# Patient Record
Sex: FEMALE | Race: BLACK OR AFRICAN AMERICAN | NOT HISPANIC OR LATINO
[De-identification: names, ages, dates, MRNs, and addresses within clinical notes are randomized per-mention and may not be internally consistent; named-entity substitution may affect disease eponyms.]

---

## 2024-03-15 PROBLEM — M17.0 PRIMARY OSTEOARTHRITIS OF BOTH KNEES: Status: RESOLVED | Noted: 2024-03-15 | Resolved: 2024-03-15

## 2024-03-15 PROBLEM — Z00.00 ENCOUNTER FOR PREVENTIVE HEALTH EXAMINATION: Status: ACTIVE | Noted: 2024-03-15

## 2024-03-15 PROBLEM — Z86.39 HISTORY OF HYPERLIPIDEMIA: Status: RESOLVED | Noted: 2024-03-15 | Resolved: 2024-03-15

## 2024-03-15 PROBLEM — Z86.79 HISTORY OF HYPERTENSION: Status: RESOLVED | Noted: 2024-03-15 | Resolved: 2024-03-15

## 2024-03-15 RX ORDER — MELOXICAM 15 MG/1
15 TABLET ORAL
Refills: 0 | Status: ACTIVE | COMMUNITY

## 2024-03-15 RX ORDER — TIZANIDINE 4 MG/1
4 TABLET ORAL
Refills: 0 | Status: ACTIVE | COMMUNITY

## 2024-03-15 RX ORDER — AMLODIPINE BESYLATE 5 MG/1
5 TABLET ORAL
Refills: 0 | Status: ACTIVE | COMMUNITY

## 2024-03-18 ENCOUNTER — APPOINTMENT (OUTPATIENT)
Dept: HEMATOLOGY ONCOLOGY | Facility: CLINIC | Age: 54
End: 2024-03-18
Payer: MEDICAID

## 2024-03-18 ENCOUNTER — LABORATORY RESULT (OUTPATIENT)
Age: 54
End: 2024-03-18

## 2024-03-18 ENCOUNTER — APPOINTMENT (OUTPATIENT)
Dept: BREAST CENTER | Facility: CLINIC | Age: 54
End: 2024-03-18
Payer: MEDICAID

## 2024-03-18 ENCOUNTER — NON-APPOINTMENT (OUTPATIENT)
Age: 54
End: 2024-03-18

## 2024-03-18 ENCOUNTER — APPOINTMENT (OUTPATIENT)
Dept: HEMATOLOGY ONCOLOGY | Facility: CLINIC | Age: 54
End: 2024-03-18

## 2024-03-18 VITALS
HEIGHT: 64 IN | WEIGHT: 135 LBS | BODY MASS INDEX: 23.05 KG/M2 | DIASTOLIC BLOOD PRESSURE: 93 MMHG | HEART RATE: 93 BPM | SYSTOLIC BLOOD PRESSURE: 134 MMHG

## 2024-03-18 VITALS — OXYGEN SATURATION: 97 % | TEMPERATURE: 98.3 F

## 2024-03-18 DIAGNOSIS — Z86.39 PERSONAL HISTORY OF OTHER ENDOCRINE, NUTRITIONAL AND METABOLIC DISEASE: ICD-10-CM

## 2024-03-18 DIAGNOSIS — M17.0 BILATERAL PRIMARY OSTEOARTHRITIS OF KNEE: ICD-10-CM

## 2024-03-18 DIAGNOSIS — Z80.3 FAMILY HISTORY OF MALIGNANT NEOPLASM OF BREAST: ICD-10-CM

## 2024-03-18 DIAGNOSIS — Z80.41 FAMILY HISTORY OF MALIGNANT NEOPLASM OF OVARY: ICD-10-CM

## 2024-03-18 DIAGNOSIS — Z78.9 OTHER SPECIFIED HEALTH STATUS: ICD-10-CM

## 2024-03-18 DIAGNOSIS — Z86.79 PERSONAL HISTORY OF OTHER DISEASES OF THE CIRCULATORY SYSTEM: ICD-10-CM

## 2024-03-18 PROCEDURE — 99205 OFFICE O/P NEW HI 60 MIN: CPT

## 2024-03-18 PROCEDURE — 93702 BIS XTRACELL FLUID ANALYSIS: CPT | Mod: NC

## 2024-03-18 NOTE — HISTORY OF PRESENT ILLNESS
[Disease: _____________________] : Disease: [unfilled] [de-identified] : 53 year old female with newly diagnosed multifocal triple negative right breast cancer is referred by Dr. Will Trejo to discuss neoadjuvant systemic therapy.  Patient palpated a lump in right breast.  2/21/24 R breast US: New 3 mm hypoechoic mass right 6:00 3 cm from the nipple. New 1 cm hypoechoic mass with indistinct margins right 9:00 4 cm from the nipple . 1.5 x 0.9 x 1.7 cm irregular hypoechoic mass right 10:00 4 cm from the nipple underlying region of palpable mass.  New 1.9 x 0.4 cm irregular hypoechoic mass right 10:00 5 cm from the nipple underlying region of palpable mass. New 1.6 x 1.2 x 1.8 cm irregular hypoechoic mass with cystic component right 10:00 6 cm from the nipple underlying region of palpable mass. Lymph nodes with thickened cortex right axillary region.  3/5/24 b/l dx MG BREAST COMPOSITION:  The breasts are extremely dense, which lowers the sensitivity of mammography.  Spot compression views of the upper outer right breast do not demonstrate any distinct mammographic abnormalities, probably due to the dense nature of the patient's breast. No suspicious masses, architectural distortion, or significant calcifications are detected in the left breast. b/l US: - 6 o'clock axis, 3 cm from the nipple, 0.3 x 0.3 x 0.4 cm ovoid circumscribed hypoechoic mass -9 o'clock axis, 4 cm from the nipple, 1.0 x 0.6 x 0.5 cm ovoid parallel hypoechoic mass with irregular margins. Ultrasound-guided biopsy is recommended. -10 o'clock axis, 4 cm from the nipple, 1.6 x 0.9 x 1.7 cm ovoid parallel hypoechoic mass with irregular margins,  -10 o'clock axis, 4 cm from the nipple, 1.4 x 0.8 x 1.5 cm ovoid parallel hypoechoic mass with irregular margins -10 o'clock axis, 5 cm from the nipple, 2.5 x 0.7 x 1.4 cm irregular hypoechoic mass -10 o'clock axis, 6 cm from the nipple, 2.6 x 1.3 x 1.9 cm irregular hypoechoic mass. -The irregular hypoechoic masses at the 10 o'clock axis, spanning the 4 to 6 cm from the nipple region, appear to be contiguous with one another and can be biopsied simultaneously. -Abnormally thickened right axillary lymph nodes, with the most prominent measuring 1.9 x 0.9 x 1.7 cm. No suspicious solid or complex cystic mass is detected in the left breast. No morphologically abnormal axillary lymph nodes are detected.   3/5/24 US guided biopsy x 3: 1. Right breast 10 o'clock axis, 4-6 cm from the nipple, core biopsy: -Poorly differentiated invasive ductal carcinoma, measuring 0.9 cm in maximal length in this material  ER 0%, MT 0%, HER2 1+, Ki-67 60% -Ductal carcinoma in situ is also present, intermediate to high nuclear grade, solid pattern with extension into lobules. 2. Right breast 9 o'clock axis, 4 cm from the nipple, core biopsy: -Poorly differentiated invasive ductal carcinoma morphologically similar to specimen 1, measuring 0.5 cm in maximal length in this material. ER 0%, MT 0%, HER2 0, Ki-67 44% -Ductal carcinoma in situ is also present, intermediate to high nuclear grade, solid pattern, with extension into lobules. 3. Right axilla, core biopsy: Metastatic carcinoma involving lymph node tissue and morphologically similar to specimen 1 and 2  [de-identified] : poorly differentiated invasive ductal carcinoma [de-identified] : ER-, RI-, HER2 0-1+, Ki-67 44-60% [de-identified] : Reports moderate intermittent pain in R breast. Patient states pain is a 5 out of 10.  Denies any complaints, except for chronic knee and back pain.

## 2024-03-18 NOTE — PHYSICAL EXAM
[Restricted in physically strenuous activity but ambulatory and able to carry out work of a light or sedentary nature] : Status 1- Restricted in physically strenuous activity but ambulatory and able to carry out work of a light or sedentary nature, e.g., light house work, office work [Normal] : affect appropriate [de-identified] : palpable solid (consisting of multifocal/centric lesions) covering an area of about 5cm, mobile, no skin changes, no nipple changes

## 2024-03-18 NOTE — ASSESSMENT
[FreeTextEntry1] : 53 year old perimenopausal woman (LMP 10/2023) with newly diagnosed multifocal triple negative right breast cancer is referred by Dr. Will Trejo to discuss neoadjuvant systemic therapy.  cT3 cN1 Stage IIIC poorly diff IDC ER 0% TX 0% HER2 renetta 1+ Ki 67 60%   Extensive discussion with patient, her  and son regarding the type of cancer, triple negative, the clinical stage necessitating neoadjuvant chemotherapy together with immunotherapy as per Keynote 522 clinical trial - ddAC with Neulasta K75poxy x4 cycles followed by weekly Taxol x12 concurrently with Pembrolizumab Q6wks during this treatment and followed after surgery as well every 3 wks x9 cycles. Mentioned in case of lack of pCR, capecitabine to be recommended. Also will need to see rad onc post op.   Reviewed potential side effects of chemotherapy and immunotherapy providing patient with patient information on each of the drugs above. Reviewed that chemotherapy can cause fatigue, hair loss, allergic/infusion reactions, skin changes, poor appetite, raise risk of infection by causing bone marrow suppression with subsequent low blood counts, potentially needing blood transfusions, also growth factor support to shorten duration of neutropenia, also nausea/vomiting, loose stools, musculoskeletal pain, peripheral neuropathy, infertility and teratogenic effects, as well as small risk of cardiomyopathy/heart failure and small risk of secondary malignancies. Also reviewed side effects that can arise from immunotherapy including but not limited to inflammatory conditions involving different organs, leading to hypothyroidism in case the thyroid is affected by treatment, colitis in case the colon is involved, pericarditis, pneumonitis etc.   Plan -  MRI breast PET to complete staging Echo IR for port placement Labs today incl HIV and hepatitis panel  Genetic testing appointment today  Compazine prn nausea Instructed to obtain a working thermometer for home and to call right away or go to ER in case of fever/temp of 100.4F or higher.   RTC in 1-2 wks to start treatment.

## 2024-03-19 NOTE — DISCUSSION/SUMMARY
[FreeTextEntry1] : REASON FOR CONSULT Larisa Workman is a 53-year-old female who was referred by Dr. Vida Trejo for cancer genetic counseling and risk assessment due to a recent breast cancer diagnosis. She was accompanied by her  and her son.   RELEVANT MEDICAL HISTORY Ms. Workman was recently diagnosed with right breast cancer in 2024 at 53 years old. Pathology report revealed right triple negative invasive ductal carcinoma metastatic to the right axillary lymph nodes.  Upcoming imaging and genetic testing will help to determine treatment plan.   OTHER MEDICAL AND SURGICAL HISTORY: -	Medical History: hyperlipidemia, hypertension, primary osteoarthritis of both knees -	Surgical History: No major medical history reported.   PAST OB/GYN HISTORY: Obstetrical History:  Age at Menarche: 12 Perimenopausal  Age at First Live Birth: 26 Oral Contraceptive Use: Yes, >5 years in total Other Contraceptive Use: IUD (Paragard), approximately 4 years in total Hormone Replacement Therapy: No  CANCER SCREENING HISTORY:   Breast:  -	Mammography: last 2024, extremely dense breast tissue. -	Sonography: last 2024, highly suspicious right breast contiguous masses and a 4-centimeter right breast mass were identified as well as abnormally thickened right axillary lymph nodes. Ultrasound-guided biopsies were recommended.  -	MRI: To be scheduled. -	Biopsies: 2024-Right breast, IDC and DCIS; Right axilla, metastatic carcinoma involving lymph node tissue and morphologically similar to right breast specimens GYN: -	Pelvic Examination: last 2 years ago, reportedly normal  -	Sonography: last 2 years ago, patient reports fibroids were identified. -	CA-125: No Colon: -	Colonoscopy: No -	Upper Endoscopy: No  Skin:   -	FBSE: No -	Lesions biopsied/removed: No  SOCIAL HISTORY: -	Tobacco-product use: No  FAMILY HISTORY: Maternal ancestry and paternal ancestry were reported as American. Ashkenazi Congregational ancestry and consanguinity were denied. A detailed family history of cancer was ascertained. Relevant diagnoses are detailed below and in the scanned pedigree.   To Ms. Workman's knowledge, no one in the family has had germline testing for cancer susceptibility.   	 	RISK ASSESSMENT: Ms. Workman's personal history of breast cancer and/or family history of breast cancer, ovarian cancer, thyroid cancer, and pancreatic cancer is suggestive of an inherited predisposition to breast cancer and/or ovarian cancer and/or pancreatic cancer and/or thyroid cancer and related cancers. Given that she plans to make surgical decisions based on results from genetic testing, we recommended the [facility] BRCAplus STAT Panel testing for genes associated with breast cancer (typically results within 5-12 days). This test analyzes 13 genes: RHIANNON, BARD1, BRCA1, BRCA2, CDH1, CHEK2, NF1, PALB2, PTEN, RAD51C, RAD51D, STK11, and TP53.  This testing will be automatically reflexed to a larger panel of genes associated with breast cancer, thyroid cancer, pancreatic cancer, and gynecological cancers which includes the following genes: APC, RHIANNON, BARD1, BMPR1A, BRCA1, BRCA2, BRIP1, CDH1, CDKN2A, CHEK2, DICER1, EPCAM, MEN1, MLH1, MSH2, MSH6, NF1, PALB2, PMS2, LJUNS2U, PTEN, RAD51C, RAD51D, RET, SMAD4, STK11, TP53, TSC1, TSC2, VHL  We discussed the risks, benefits and limitations, and implications of genetic testing. We also discussed the psychosocial implications of genetic testing. Possible test results were reviewed with Ms. Workman, along with associated medical management options.   Ms. Workman consented to the above-mentioned genetic testing panel. Blood was drawn in our laboratory and sent to [facility] today.  PLAN:  1.	Blood drawn today will be sent to [facility] for analysis.  2.	We will contact Ms. Workman once the results are available and will schedule a follow-up appointment, as needed. STAT results generally return in 10-12 days from the day the sample is received in the lab.  For any additional questions please call Cancer Genetics at [phone].    Neeta Donald MS, [facility] Genetic Counselor, Cancer Genetics   CC: Dr. Vida Trejo

## 2024-03-20 RX ORDER — OLANZAPINE 5 MG/1
5 TABLET, FILM COATED ORAL
Qty: 16 | Refills: 0 | Status: ACTIVE | COMMUNITY
Start: 2024-03-20 | End: 1900-01-01

## 2024-03-20 RX ORDER — PROCHLORPERAZINE MALEATE 5 MG/1
5 TABLET ORAL
Qty: 30 | Refills: 0 | Status: ACTIVE | COMMUNITY
Start: 2024-03-20 | End: 1900-01-01

## 2024-03-20 RX ORDER — LIDOCAINE AND PRILOCAINE 25; 25 MG/G; MG/G
2.5-2.5 CREAM TOPICAL
Qty: 1 | Refills: 1 | Status: ACTIVE | COMMUNITY
Start: 2024-03-20 | End: 1900-01-01

## 2024-03-25 ENCOUNTER — APPOINTMENT (OUTPATIENT)
Dept: INTERVENTIONAL RADIOLOGY/VASCULAR | Facility: HOSPITAL | Age: 54
End: 2024-03-25

## 2024-03-25 ENCOUNTER — OUTPATIENT (OUTPATIENT)
Dept: OUTPATIENT SERVICES | Facility: HOSPITAL | Age: 54
LOS: 1 days | End: 2024-03-25
Payer: MEDICAID

## 2024-03-25 ENCOUNTER — RESULT REVIEW (OUTPATIENT)
Age: 54
End: 2024-03-25

## 2024-03-25 ENCOUNTER — NON-APPOINTMENT (OUTPATIENT)
Age: 54
End: 2024-03-25

## 2024-03-25 PROCEDURE — 36561 INSERT TUNNELED CV CATH: CPT

## 2024-03-25 PROCEDURE — 99153 MOD SED SAME PHYS/QHP EA: CPT

## 2024-03-25 PROCEDURE — 77001 FLUOROGUIDE FOR VEIN DEVICE: CPT | Mod: 26

## 2024-03-25 PROCEDURE — C1769: CPT

## 2024-03-25 PROCEDURE — 76937 US GUIDE VASCULAR ACCESS: CPT | Mod: 26

## 2024-03-25 PROCEDURE — 99152 MOD SED SAME PHYS/QHP 5/>YRS: CPT

## 2024-03-25 PROCEDURE — 77001 FLUOROGUIDE FOR VEIN DEVICE: CPT

## 2024-03-25 PROCEDURE — 76937 US GUIDE VASCULAR ACCESS: CPT

## 2024-03-25 PROCEDURE — C1788: CPT

## 2024-03-25 NOTE — REVIEW OF SYSTEMS
[Fever] : no fever [Chills] : no chills [Shortness Of Breath] : no shortness of breath [Wheezing] : no wheezing [Skin Lesions] : no skin lesions [Skin Wound] : no skin wound

## 2024-03-25 NOTE — DISCUSSION/SUMMARY
[FreeTextEntry1] : RESULTS TRANSMISSION Larisa Workman is a 53-year-old female who was called on 03/25/2024 for a discussion regarding their genetic testing results related to hereditary cancer predisposition.   Ms. Workman was originally seen at [facility] on 03/18/2024 for hereditary cancer predisposition risk assessment due to a recent breast cancer diagnosis. Ms. Workman decided to pursue genetic testing using the STAT BRCAplus panel with concurrent reflex to a multigene panel of genes associated with breast cancer, gynecological cancers, thyroid cancer, and pancreatic cancer offered at [facility].  TEST RESULTS: NEGATIVE  No pathogenic (disease-causing) variants or VUSs were detected in the following genes:  APC, RHIANNON, BARD1, BMPR1A, BRCA1, BRCA2, BRIP1, CDH1, CDKN2A, CHEK2, DICER1, EPCAM, MEN1, MLH1, MSH2, MSH6, NF1, PALB2, PMS2, JBPJC9P, PTEN, RAD51C, RAD51D, RET, SMAD4, STK11, TP53, TSC1, TSC2, and VHL.  RESULTS INTERPRETATION AND ASSESSMENT: We discussed with Ms. Workman that we will present her personal and family history of cancer along with her genetic testing results to Cancer [facility]etics Case Conference this upcoming Thursday to discuss appropriate management recommendations. We will recontact Ms. Workman to discuss management recommendations after case conference.  PLAN: 1.We will recontact Ms. Workman to discuss management recommendations following Cancer Genetics Case Conference this week.   For any additional questions please call Cancer Genetics at [phone].    Neeta Donald MS, [facility] Genetic Counselor, Cancer Genetics   CC:  Patient Dr. Vida Trejo

## 2024-03-25 NOTE — PAST MEDICAL HISTORY
[Menstruating] : The patient is menstruating [Menarche Age ____] : age at menarche was [unfilled] [Definite ___ (Date)] : the last menstrual period was [unfilled] [Total Preg ___] : G[unfilled] [Live Births ___] : P[unfilled]  [Age At Live Birth ___] : Age at live birth: [unfilled] [History of Hormone Replacement Treatment] : has no history of hormone replacement treatment [FreeTextEntry6] : No [FreeTextEntry7] : No [FreeTextEntry8] : N/A

## 2024-03-25 NOTE — HISTORY OF PRESENT ILLNESS
[FreeTextEntry1] : Patient is a 52 yo F who presents today for initial evaluation of R US bx proven metastatic triple negative IDC found as palpable mass by patient in December and seen on R US as 1 cm mass w/ irregular margins 9:00 4FN and as 3 contiguous masses at 10:00 measuring 1.7 cm 4FN, 1.9 cm 5FN, 1.8 cm 6FN (2/2024). R axillary US bx of abnl R 2.2 cm LN was performed and yielded metastatic carcinoma c/w breast primary. Family history of breast cancer in sister (Age 50s, unknown genetics) and maternal grandmother (Age 60s, unknown genetics), and maternal cousin w/ ovarian cancer (Age 40s, unknown genetics, DOD). No genetic testing. Denies prior breast surgeries. Patient denies skin changes or nipple discharge bilaterally. Patient has hx herniated disc and L knee arthritis for which she takes gabapentin, meloxicam, and tizanidine.  2/21/24: R US (palpable)- new 0.3 cm mass 6:00 3FN, new 1 cm mass w/ indistinct margins 9:00 4FN (rec R US bx), 1.7 cm irregular mass 10:00 4FN (palpable region- rec R US bx), new 1.9 cm irregular mass 10:00 5FN (palpable region-rec R US bx), new 1.8 cm irregular cystic mass 10:00 6FN (palpable region-rec R US bx), R axillary region LNs w/ thickened cortex (rec R US bx). Rec b/l dxMG and L US prior to bx. BIRADS 5. 3/5/24: B/l MG & US- extremely dense, 1 cm mass w/ indistinct margins 9:00 4FN (rec R US bx), 1.7 cm irregular mass 10:00 4FN, 1.9 cm irregular mass 10:00 5FN, 1.8 cm irregular cystic mass 10:00 6FN (all masses 10:00 4-6FN palpable region appear to be contiguous -rec R US bx), R 1.9 cm abnormal axillary LN (rec R US bx), L-no suspicious solid or complex mass detected. BIRADS 4. 3/5/24: R US bx x3- SITE 1) R contiguous masses 10:00 4-6FN (ribbon clip)- IDC (poorly differentiated) (ER-(0%)/NC- (0%)/HER2- (1+)), DCIS (intermediate to high nuclear grade, solid pattern, w/ extension into lobules). Malignant & Concordant. SITE 2) R 1.2 cm mass 9:00 4FN (heart clip)- IDC (poorly differentiated) (ER- (0%)/NC- (0%)/HER2- (0), DCIS (intermediate to high nuclear grade, solid pattern, w/ extension into lobules). Malignant & Concordant. SITE 3) R 2.2 cm axillary LN (Venus clip)- metastatic carcinoma involving LN tissue and morphologically similar to specimens 1 & 2.

## 2024-03-25 NOTE — PHYSICAL EXAM
[Normocephalic] : normocephalic [EOMI] : extra ocular movement intact [Supple] : supple [No Supraclavicular Adenopathy] : no supraclavicular adenopathy [No Cervical Adenopathy] : no cervical adenopathy [de-identified] : R palpable mass 5 cm upper outer quadrant [de-identified] : L breast/axilla/supraclavicular area: No masses, discharge, or adenopathy

## 2024-03-27 ENCOUNTER — OUTPATIENT (OUTPATIENT)
Dept: OUTPATIENT SERVICES | Facility: HOSPITAL | Age: 54
LOS: 1 days | End: 2024-03-27
Payer: MEDICAID

## 2024-03-27 ENCOUNTER — NON-APPOINTMENT (OUTPATIENT)
Age: 54
End: 2024-03-27

## 2024-03-27 ENCOUNTER — RESULT REVIEW (OUTPATIENT)
Age: 54
End: 2024-03-27

## 2024-03-27 DIAGNOSIS — C50.911 MALIGNANT NEOPLASM OF UNSPECIFIED SITE OF RIGHT FEMALE BREAST: ICD-10-CM

## 2024-03-27 PROCEDURE — 93306 TTE W/DOPPLER COMPLETE: CPT

## 2024-03-27 PROCEDURE — 93306 TTE W/DOPPLER COMPLETE: CPT | Mod: 26

## 2024-03-28 ENCOUNTER — NON-APPOINTMENT (OUTPATIENT)
Age: 54
End: 2024-03-28

## 2024-03-28 NOTE — DISCUSSION/SUMMARY
[FreeTextEntry1] : RESULTS TRANSMISSION Larisa Workman is a 53-year-old female who was recontacted on 03/28/2024 to further discuss management recommendations from Cancer [facility]etics Case conference.   Ms. Workman was originally seen at [facility] on 03/18/2024 for hereditary cancer predisposition risk assessment due to a recent breast cancer diagnosis. Ms. Workman decided to pursue genetic testing using the STAT BRCAplus panel with concurrent reflex to a multigene panel of genes associated with breast cancer, gynecological cancers, thyroid cancer, and pancreatic cancer offered at [facility]. Ms. Workman was contacted on 03/25/2024 to discuss her genetic testing results related to hereditary cancer predisposition.  Ms. Workman's personal and family history of cancer as well as her genetic testing results were presented to Cancer Genetics Case conference on 03/28/2024 for review and discussion of management recommendations.   TEST RESULTS: NEGATIVE  No pathogenic (disease-causing) variants or VUSs were detected in the following genes:  APC, RHIANNON, BARD1, BMPR1A, BRCA1, BRCA2, BRIP1, CDH1, CDKN2A, CHEK2, DICER1, EPCAM, MEN1, MLH1, MSH2, MSH6, NF1, PALB2, PMS2, PHAIA6X, PTEN, RAD51C, RAD51D, RET, SMAD4, STK11, TP53, TSC1, TSC2, and VHL.  RESULTS INTERPRETATION AND ASSESSMENT: Given Ms. Workman's personal and current reported family history of cancer, and her negative genetic test results, the following screening guidelines and risk-reducing recommendations were discussed:  BREAST:  - Long-term management and surveillance should be based on Ms. Workman's on- or post-treatment protocol as recommended by her breast care team.   COLON: -As per the [place] Multi-Society Task Force on Colorectal Cancer, we discussed individuals with a first-degree relative with colorectal cancer diagnosed over the age of 60 should start screening via colonoscopies at age 40 and repeat as per the average-risk screening recommendations (or sooner as based on colonoscopy findings).  OTHER:  - In the absence of other indications, Ms. Workman should practice age-appropriate cancer screening of other organ systems as recommended for the general population.  We also discussed that, while no cause of the patient's personal and family history of cancer was identified, this result, while reassuring, does entirely not rule out a hereditary cancer risk in the patient. It is possible, although unlikely, the patient has a mutation in one of the genes tested that is not detectable by this analysis, or has a mutation in a different gene, either known or unknown. It is also possible there is a hereditary cancer predisposition in the family, but the patient did not inherit it.  We informed Ms. Workman that our knowledge of genetics and inherited cancer conditions is changing rapidly. Therefore, we recommended that Ms. Workman contact our office, every 2 to 3 years, to discuss relevant advances in cancer genetics.  We emphasized the importance of re-contacting us with updates regarding her personal and family history of cancer as well as any updates regarding additional cancer genetic test results performed for the patient and/or family members.  Such updates could possibly change our risk assessment and recommendations.   In addition, we discussed the children of Ms. Workman's maternal first cousin who was diagnosed with ovarian cancer could consider pursuing cancer risk assessment genetic counseling with the option of genetic testing. We also discussed that Ms. Workman's daughter pursue cancer risk assessment at 40 years old to determine appropriate breast cancer screening recommendations.  PLAN: 1.See above for recommended screening and risk-reduction strategies. 2. Patient informed consult note(s) will be available through their [facility] patient portal and genetic test results will be released via [facility] laboratory portal.  3. Ms. Workman was encouraged to contact us every 2-3 years to discuss relevant advances in cancer genetics, or sooner if there are any changes in her personal or family history of cancer.  For any additional questions please call Cancer Genetics at [phone].    Neeta Donald MS, [facility] Genetic Counselor, Cancer Genetics  CC:  Patient Dr. Vida Trejo

## 2024-04-01 ENCOUNTER — APPOINTMENT (OUTPATIENT)
Dept: INTERVENTIONAL RADIOLOGY/VASCULAR | Facility: HOSPITAL | Age: 54
End: 2024-04-01

## 2024-04-03 ENCOUNTER — OUTPATIENT (OUTPATIENT)
Dept: OUTPATIENT SERVICES | Facility: HOSPITAL | Age: 54
LOS: 1 days | End: 2024-04-03
Payer: COMMERCIAL

## 2024-04-03 ENCOUNTER — APPOINTMENT (OUTPATIENT)
Dept: INFUSION THERAPY | Facility: CLINIC | Age: 54
End: 2024-04-03

## 2024-04-03 ENCOUNTER — NON-APPOINTMENT (OUTPATIENT)
Age: 54
End: 2024-04-03

## 2024-04-03 ENCOUNTER — APPOINTMENT (OUTPATIENT)
Dept: HEMATOLOGY ONCOLOGY | Facility: CLINIC | Age: 54
End: 2024-04-03
Payer: COMMERCIAL

## 2024-04-03 VITALS
HEIGHT: 64 IN | RESPIRATION RATE: 18 BRPM | WEIGHT: 134.04 LBS | SYSTOLIC BLOOD PRESSURE: 139 MMHG | TEMPERATURE: 98 F | DIASTOLIC BLOOD PRESSURE: 81 MMHG | OXYGEN SATURATION: 100 % | HEART RATE: 78 BPM

## 2024-04-03 VITALS
WEIGHT: 134 LBS | RESPIRATION RATE: 18 BRPM | OXYGEN SATURATION: 100 % | BODY MASS INDEX: 22.88 KG/M2 | DIASTOLIC BLOOD PRESSURE: 81 MMHG | SYSTOLIC BLOOD PRESSURE: 139 MMHG | HEART RATE: 78 BPM | HEIGHT: 64 IN | TEMPERATURE: 98.3 F

## 2024-04-03 DIAGNOSIS — C50.919 MALIGNANT NEOPLASM OF UNSPECIFIED SITE OF UNSPECIFIED FEMALE BREAST: ICD-10-CM

## 2024-04-03 PROCEDURE — 96372 THER/PROPH/DIAG INJ SC/IM: CPT

## 2024-04-03 PROCEDURE — 96375 TX/PRO/DX INJ NEW DRUG ADDON: CPT

## 2024-04-03 PROCEDURE — 99214 OFFICE O/P EST MOD 30 MIN: CPT

## 2024-04-03 PROCEDURE — 96367 TX/PROPH/DG ADDL SEQ IV INF: CPT

## 2024-04-03 PROCEDURE — 96413 CHEMO IV INFUSION 1 HR: CPT

## 2024-04-03 PROCEDURE — 96411 CHEMO IV PUSH ADDL DRUG: CPT

## 2024-04-03 PROCEDURE — 96417 CHEMO IV INFUS EACH ADDL SEQ: CPT

## 2024-04-03 RX ORDER — PALONOSETRON HYDROCHLORIDE 0.25 MG/5ML
0.25 INJECTION, SOLUTION INTRAVENOUS ONCE
Refills: 0 | Status: COMPLETED | OUTPATIENT
Start: 2024-04-03 | End: 2024-04-03

## 2024-04-03 RX ORDER — PEGFILGRASTIM-CBQV 6 MG/.6ML
6 INJECTION, SOLUTION SUBCUTANEOUS ONCE
Refills: 0 | Status: COMPLETED | OUTPATIENT
Start: 2024-04-03 | End: 2024-04-03

## 2024-04-03 RX ORDER — DEXAMETHASONE 0.5 MG/5ML
12 ELIXIR ORAL ONCE
Refills: 0 | Status: COMPLETED | OUTPATIENT
Start: 2024-04-03 | End: 2024-04-03

## 2024-04-03 RX ORDER — DOXORUBICIN HYDROCHLORIDE 2 MG/ML
99 INJECTION, SOLUTION INTRAVENOUS ONCE
Refills: 0 | Status: COMPLETED | OUTPATIENT
Start: 2024-04-03 | End: 2024-04-03

## 2024-04-03 RX ORDER — SODIUM CHLORIDE 9 MG/ML
10 INJECTION INTRAMUSCULAR; INTRAVENOUS; SUBCUTANEOUS ONCE
Refills: 0 | Status: COMPLETED | OUTPATIENT
Start: 2024-04-03 | End: 2024-04-03

## 2024-04-03 RX ORDER — LIDOCAINE 4 G/100G
1 CREAM TOPICAL ONCE
Refills: 0 | Status: COMPLETED | OUTPATIENT
Start: 2024-04-03 | End: 2024-04-03

## 2024-04-03 RX ORDER — PEMBROLIZUMAB 25 MG/ML
400 INJECTION, SOLUTION INTRAVENOUS ONCE
Refills: 0 | Status: COMPLETED | OUTPATIENT
Start: 2024-04-03 | End: 2024-04-03

## 2024-04-03 RX ORDER — FOSAPREPITANT DIMEGLUMINE 150 MG/5ML
150 INJECTION, POWDER, LYOPHILIZED, FOR SOLUTION INTRAVENOUS ONCE
Refills: 0 | Status: COMPLETED | OUTPATIENT
Start: 2024-04-03 | End: 2024-04-03

## 2024-04-03 RX ORDER — CYCLOPHOSPHAMIDE 100 MG
996 VIAL (EA) INTRAVENOUS ONCE
Refills: 0 | Status: COMPLETED | OUTPATIENT
Start: 2024-04-03 | End: 2024-04-03

## 2024-04-03 RX ADMIN — FOSAPREPITANT DIMEGLUMINE 150 MILLIGRAM(S): 150 INJECTION, POWDER, LYOPHILIZED, FOR SOLUTION INTRAVENOUS at 16:55

## 2024-04-03 RX ADMIN — PALONOSETRON HYDROCHLORIDE 0.25 MILLIGRAM(S): 0.25 INJECTION, SOLUTION INTRAVENOUS at 17:00

## 2024-04-03 RX ADMIN — FOSAPREPITANT DIMEGLUMINE 500 MILLIGRAM(S): 150 INJECTION, POWDER, LYOPHILIZED, FOR SOLUTION INTRAVENOUS at 16:25

## 2024-04-03 RX ADMIN — Medication 996 MILLIGRAM(S): at 17:20

## 2024-04-03 RX ADMIN — Medication 996 MILLIGRAM(S): at 18:00

## 2024-04-03 RX ADMIN — SODIUM CHLORIDE 10 MILLILITER(S): 9 INJECTION INTRAMUSCULAR; INTRAVENOUS; SUBCUTANEOUS at 18:45

## 2024-04-03 RX ADMIN — PEMBROLIZUMAB 400 MILLIGRAM(S): 25 INJECTION, SOLUTION INTRAVENOUS at 15:36

## 2024-04-03 RX ADMIN — Medication 212 MILLIGRAM(S): at 16:10

## 2024-04-03 RX ADMIN — Medication 12 MILLIGRAM(S): at 16:25

## 2024-04-03 RX ADMIN — PEMBROLIZUMAB 400 MILLIGRAM(S): 25 INJECTION, SOLUTION INTRAVENOUS at 16:10

## 2024-04-03 RX ADMIN — PEGFILGRASTIM-CBQV 6 MILLIGRAM(S): 6 INJECTION, SOLUTION SUBCUTANEOUS at 18:26

## 2024-04-03 RX ADMIN — DOXORUBICIN HYDROCHLORIDE 594 MILLIGRAM(S): 2 INJECTION, SOLUTION INTRAVENOUS at 17:10

## 2024-04-04 ENCOUNTER — NON-APPOINTMENT (OUTPATIENT)
Age: 54
End: 2024-04-04

## 2024-04-04 NOTE — ASSESSMENT
[FreeTextEntry1] : 53 year old perimenopausal woman (LMP 10/2023) with newly diagnosed multifocal triple negative right breast cancer is referred by Dr. Will Trejo to discuss neoadjuvant systemic therapy.  cT3 cN1 Stage IIIC poorly diff IDC ER 0% IN 0% HER2 renetta 1+ Ki 67 60%   Extensive discussion with patient, her  and son regarding the type of cancer, triple negative, the clinical stage necessitating neoadjuvant chemotherapy together with immunotherapy as per Keynote 522 clinical trial - ddAC with Neulasta [id number] x4 cycles followed by weekly Taxol x12 concurrently with Pembrolizumab Q6wks during this treatment and followed after surgery as well every 3 wks x9 cycles. Mentioned in case of lack of pCR, capecitabine to be recommended. Also will need to see rad onc post op.   Reviewed potential side effects of chemotherapy and immunotherapy providing patient with patient information on each of the drugs above. Reviewed that chemotherapy can cause fatigue, hair loss, allergic/infusion reactions, skin changes, poor appetite, raise risk of infection by causing bone marrow suppression with subsequent low blood counts, potentially needing blood transfusions, also growth factor support to shorten duration of neutropenia, also nausea/vomiting, loose stools, musculoskeletal pain, peripheral neuropathy, infertility and teratogenic effects, as well as small risk of cardiomyopathy/heart failure and small risk of secondary malignancies. Also reviewed side effects that can arise from immunotherapy including but not limited to inflammatory conditions involving different organs, leading to hypothyroidism in case the thyroid is affected by treatment, colitis in case the colon is involved, pericarditis, pneumonitis etc.   Visit 4/3/24: C1 ddAC and pembro ECHO 3/27/24 reviewed: EF 60-65% PET 3/27/24: showed enhancing lesion in R breast c/w her newly diagnosed breast cancer in addition to R axillary LAD. Focal hypermetabolic activity also noted in L colon which warrants GI evaluation at a later date. Patient reminded of need for colonoscopy. (she never had one) Olanzapine, prochlorperazine and lidocaine cream prescriptions sent to her pharmacy. Dietician will see her chairside to address her nutrition questions. She has been cleared for treatment using labs from 3/18/24   Compazine prn nausea, olanzapine 1tab/day for four days starting day of chemo. Instructed to obtain a working thermometer for home and to call right away or go to ER in case of fever/temp of 100.4F or higher.   RTC in 2 weeks for f/u and C2.

## 2024-04-04 NOTE — PHYSICAL EXAM
[Restricted in physically strenuous activity but ambulatory and able to carry out work of a light or sedentary nature] : Status 1- Restricted in physically strenuous activity but ambulatory and able to carry out work of a light or sedentary nature, e.g., light house work, office work [Normal] : affect appropriate [de-identified] : palpable solid (consisting of multifocal/centric lesions) covering an area of about 5cm, mobile, no skin changes, no nipple changes

## 2024-04-04 NOTE — HISTORY OF PRESENT ILLNESS
[Disease: _____________________] : Disease: [unfilled] [de-identified] : 53 year old female with newly diagnosed multifocal triple negative right breast cancer is referred by Dr. Will Trejo to discuss neoadjuvant systemic therapy.  Patient palpated a lump in right breast.  2/21/24 R breast US: New 3 mm hypoechoic mass right 6:00 3 cm from the nipple. New 1 cm hypoechoic mass with indistinct margins right 9:00 4 cm from the nipple . 1.5 x 0.9 x 1.7 cm irregular hypoechoic mass right 10:00 4 cm from the nipple underlying region of palpable mass.  New 1.9 x 0.4 cm irregular hypoechoic mass right 10:00 5 cm from the nipple underlying region of palpable mass. New 1.6 x 1.2 x 1.8 cm irregular hypoechoic mass with cystic component right 10:00 6 cm from the nipple underlying region of palpable mass. Lymph nodes with thickened cortex right axillary region.  3/5/24 b/l dx MG BREAST COMPOSITION:  The breasts are extremely dense, which lowers the sensitivity of mammography.  Spot compression views of the upper outer right breast do not demonstrate any distinct mammographic abnormalities, probably due to the dense nature of the patient's breast. No suspicious masses, architectural distortion, or significant calcifications are detected in the left breast. b/l US: - 6 o'clock axis, 3 cm from the nipple, 0.3 x 0.3 x 0.4 cm ovoid circumscribed hypoechoic mass -9 o'clock axis, 4 cm from the nipple, 1.0 x 0.6 x 0.5 cm ovoid parallel hypoechoic mass with irregular margins. Ultrasound-guided biopsy is recommended. -10 o'clock axis, 4 cm from the nipple, 1.6 x 0.9 x 1.7 cm ovoid parallel hypoechoic mass with irregular margins,  -10 o'clock axis, 4 cm from the nipple, 1.4 x 0.8 x 1.5 cm ovoid parallel hypoechoic mass with irregular margins -10 o'clock axis, 5 cm from the nipple, 2.5 x 0.7 x 1.4 cm irregular hypoechoic mass -10 o'clock axis, 6 cm from the nipple, 2.6 x 1.3 x 1.9 cm irregular hypoechoic mass. -The irregular hypoechoic masses at the 10 o'clock axis, spanning the 4 to 6 cm from the nipple region, appear to be contiguous with one another and can be biopsied simultaneously. -Abnormally thickened right axillary lymph nodes, with the most prominent measuring 1.9 x 0.9 x 1.7 cm. No suspicious solid or complex cystic mass is detected in the left breast. No morphologically abnormal axillary lymph nodes are detected.   3/5/24 US guided biopsy x 3: 1. Right breast 10 o'clock axis, 4-6 cm from the nipple, core biopsy: -Poorly differentiated invasive ductal carcinoma, measuring 0.9 cm in maximal length in this material  ER 0%, CA 0%, HER2 1+, Ki-67 60% -Ductal carcinoma in situ is also present, intermediate to high nuclear grade, solid pattern with extension into lobules. 2. Right breast 9 o'clock axis, 4 cm from the nipple, core biopsy: -Poorly differentiated invasive ductal carcinoma morphologically similar to specimen 1, measuring 0.5 cm in maximal length in this material. ER 0%, CA 0%, HER2 0, Ki-67 44% -Ductal carcinoma in situ is also present, intermediate to high nuclear grade, solid pattern, with extension into lobules. 3. Right axilla, core biopsy: Metastatic carcinoma involving lymph node tissue and morphologically similar to specimen 1 and 2  3/26/24: MRI B/L Breast: Biopsy-proven right breast malignancy measuring at least 5.5 cm. Possible satellite upper central right breast, 2 closed the chest wall to biopsy. Recommend surgical/oncologic managment. Metastatic lymph node in the right axilla. No evidence of left breast malignancy.  [de-identified] : poorly differentiated invasive ductal carcinoma [de-identified] : ER-, [place]-, HER2 0-1+, Ki-67 44-60% [de-identified] : Here to start C1 ddAC + Neulasta with Pembro. Reports mild discomfort around chemoport. Requested to meet with dietician to discuss nutrition/food options while undergoing chemo.  She was accompanied by her daughter at this visit, who used to be an oncology infusion center nurse at [employer].

## 2024-04-08 ENCOUNTER — APPOINTMENT (OUTPATIENT)
Dept: INTERVENTIONAL RADIOLOGY/VASCULAR | Facility: HOSPITAL | Age: 54
End: 2024-04-08

## 2024-04-08 NOTE — HISTORY OF PRESENT ILLNESS
[FreeTextEntry1] : Pt presents for one week port check s/p placement on left chest on 3/25. Pt reports she has some tenderness of the area that was worsened after having imaging done Wednesday where she had to lift her arms above her head.. Pt reports port will be used this upcoming Wednesday for chemo.

## 2024-04-08 NOTE — ASSESSMENT
[FreeTextEntry1] : steri strips removed from left chest incision revealing well healing linear surgical incision with well approximated wound edges. small suture tail trimmed. no erythema, edema, or purulent drainage visualized. verbal wound care instructions given. pt verbalized understanding. RTC PRN.

## 2024-04-08 NOTE — HISTORY OF PRESENT ILLNESS
[FreeTextEntry1] : pt is 2 weeks s/p left chest port placement. reports no issues with chemo infusion last weeks, feels fatigued. denies pain, fever , chills, or drainage of area.

## 2024-04-08 NOTE — ASSESSMENT
[FreeTextEntry1] : left chest and neck with linear surgical incisions. chest incision with mild superficial dehiscence of lateral chest incision. neck incision well approximated. chest incision cleansed with chloraprep and steri strips applied. verbal wound care instructions given. pt to return in 1 week for skin check, or sooner as needed.

## 2024-04-16 RX ORDER — FOSAPREPITANT DIMEGLUMINE 150 MG/5ML
150 INJECTION, POWDER, LYOPHILIZED, FOR SOLUTION INTRAVENOUS ONCE
Refills: 0 | Status: COMPLETED | OUTPATIENT
Start: 2024-04-17 | End: 2024-04-17

## 2024-04-16 RX ORDER — PALONOSETRON HYDROCHLORIDE 0.25 MG/5ML
0.25 INJECTION, SOLUTION INTRAVENOUS ONCE
Refills: 0 | Status: COMPLETED | OUTPATIENT
Start: 2024-04-17 | End: 2024-04-17

## 2024-04-16 RX ORDER — SODIUM CHLORIDE 9 MG/ML
10 INJECTION INTRAMUSCULAR; INTRAVENOUS; SUBCUTANEOUS ONCE
Refills: 0 | Status: COMPLETED | OUTPATIENT
Start: 2024-04-17 | End: 2024-04-17

## 2024-04-16 RX ORDER — LIDOCAINE 4 G/100G
1 CREAM TOPICAL ONCE
Refills: 0 | Status: COMPLETED | OUTPATIENT
Start: 2024-04-17 | End: 2024-04-17

## 2024-04-16 RX ORDER — DEXAMETHASONE 0.5 MG/5ML
12 ELIXIR ORAL ONCE
Refills: 0 | Status: COMPLETED | OUTPATIENT
Start: 2024-04-17 | End: 2024-04-17

## 2024-04-16 RX ORDER — PEGFILGRASTIM-CBQV 6 MG/.6ML
6 INJECTION, SOLUTION SUBCUTANEOUS ONCE
Refills: 0 | Status: COMPLETED | OUTPATIENT
Start: 2024-04-17 | End: 2024-04-17

## 2024-04-17 ENCOUNTER — APPOINTMENT (OUTPATIENT)
Dept: INFUSION THERAPY | Facility: CLINIC | Age: 54
End: 2024-04-17

## 2024-04-17 ENCOUNTER — NON-APPOINTMENT (OUTPATIENT)
Age: 54
End: 2024-04-17

## 2024-04-17 ENCOUNTER — APPOINTMENT (OUTPATIENT)
Dept: HEMATOLOGY ONCOLOGY | Facility: CLINIC | Age: 54
End: 2024-04-17
Payer: COMMERCIAL

## 2024-04-17 ENCOUNTER — LABORATORY RESULT (OUTPATIENT)
Age: 54
End: 2024-04-17

## 2024-04-17 ENCOUNTER — OUTPATIENT (OUTPATIENT)
Dept: OUTPATIENT SERVICES | Facility: HOSPITAL | Age: 54
LOS: 1 days | End: 2024-04-17
Payer: COMMERCIAL

## 2024-04-17 VITALS
TEMPERATURE: 98.5 F | BODY MASS INDEX: 22.53 KG/M2 | HEART RATE: 79 BPM | HEIGHT: 64 IN | WEIGHT: 132 LBS | DIASTOLIC BLOOD PRESSURE: 73 MMHG | SYSTOLIC BLOOD PRESSURE: 116 MMHG | OXYGEN SATURATION: 100 % | RESPIRATION RATE: 18 BRPM

## 2024-04-17 VITALS
OXYGEN SATURATION: 99 % | DIASTOLIC BLOOD PRESSURE: 73 MMHG | TEMPERATURE: 98 F | HEIGHT: 64 IN | WEIGHT: 132.06 LBS | RESPIRATION RATE: 18 BRPM | HEART RATE: 79 BPM | SYSTOLIC BLOOD PRESSURE: 116 MMHG

## 2024-04-17 DIAGNOSIS — C50.919 MALIGNANT NEOPLASM OF UNSPECIFIED SITE OF UNSPECIFIED FEMALE BREAST: ICD-10-CM

## 2024-04-17 PROCEDURE — 99214 OFFICE O/P EST MOD 30 MIN: CPT

## 2024-04-17 PROCEDURE — 96411 CHEMO IV PUSH ADDL DRUG: CPT

## 2024-04-17 PROCEDURE — 96375 TX/PRO/DX INJ NEW DRUG ADDON: CPT

## 2024-04-17 PROCEDURE — 96367 TX/PROPH/DG ADDL SEQ IV INF: CPT

## 2024-04-17 PROCEDURE — 96413 CHEMO IV INFUSION 1 HR: CPT

## 2024-04-17 PROCEDURE — 96372 THER/PROPH/DIAG INJ SC/IM: CPT

## 2024-04-17 RX ORDER — CYCLOPHOSPHAMIDE 100 MG
996 VIAL (EA) INTRAVENOUS ONCE
Refills: 0 | Status: COMPLETED | OUTPATIENT
Start: 2024-04-17 | End: 2024-04-17

## 2024-04-17 RX ORDER — DOXORUBICIN HYDROCHLORIDE 2 MG/ML
99 INJECTION, SOLUTION INTRAVENOUS ONCE
Refills: 0 | Status: COMPLETED | OUTPATIENT
Start: 2024-04-17 | End: 2024-04-17

## 2024-04-17 RX ADMIN — Medication 212 MILLIGRAM(S): at 12:50

## 2024-04-17 RX ADMIN — PEGFILGRASTIM-CBQV 6 MILLIGRAM(S): 6 INJECTION, SOLUTION SUBCUTANEOUS at 15:00

## 2024-04-17 RX ADMIN — Medication 996 MILLIGRAM(S): at 14:35

## 2024-04-17 RX ADMIN — Medication 12 MILLIGRAM(S): at 13:05

## 2024-04-17 RX ADMIN — PALONOSETRON HYDROCHLORIDE 0.25 MILLIGRAM(S): 0.25 INJECTION, SOLUTION INTRAVENOUS at 13:40

## 2024-04-17 RX ADMIN — SODIUM CHLORIDE 10 MILLILITER(S): 9 INJECTION INTRAMUSCULAR; INTRAVENOUS; SUBCUTANEOUS at 15:10

## 2024-04-17 RX ADMIN — FOSAPREPITANT DIMEGLUMINE 150 MILLIGRAM(S): 150 INJECTION, POWDER, LYOPHILIZED, FOR SOLUTION INTRAVENOUS at 13:35

## 2024-04-17 RX ADMIN — Medication 996 MILLIGRAM(S): at 14:00

## 2024-04-17 RX ADMIN — DOXORUBICIN HYDROCHLORIDE 594 MILLIGRAM(S): 2 INJECTION, SOLUTION INTRAVENOUS at 13:50

## 2024-04-17 RX ADMIN — FOSAPREPITANT DIMEGLUMINE 500 MILLIGRAM(S): 150 INJECTION, POWDER, LYOPHILIZED, FOR SOLUTION INTRAVENOUS at 13:05

## 2024-04-17 NOTE — PHYSICAL EXAM
[Restricted in physically strenuous activity but ambulatory and able to carry out work of a light or sedentary nature] : Status 1- Restricted in physically strenuous activity but ambulatory and able to carry out work of a light or sedentary nature, e.g., light house work, office work [Normal] : affect appropriate [de-identified] : down from 5cm to about 3cm, mobile hard mass

## 2024-04-17 NOTE — HISTORY OF PRESENT ILLNESS
[Disease: _____________________] : Disease: [unfilled] [de-identified] : 53 year old female with newly diagnosed multifocal triple negative right breast cancer is referred by Dr. Will Trejo to discuss neoadjuvant systemic therapy.  Patient palpated a lump in right breast.  2/21/24 R breast US: New 3 mm hypoechoic mass right 6:00 3 cm from the nipple. New 1 cm hypoechoic mass with indistinct margins right 9:00 4 cm from the nipple . 1.5 x 0.9 x 1.7 cm irregular hypoechoic mass right 10:00 4 cm from the nipple underlying region of palpable mass.  New 1.9 x 0.4 cm irregular hypoechoic mass right 10:00 5 cm from the nipple underlying region of palpable mass. New 1.6 x 1.2 x 1.8 cm irregular hypoechoic mass with cystic component right 10:00 6 cm from the nipple underlying region of palpable mass. Lymph nodes with thickened cortex right axillary region.  3/5/24 b/l dx MG BREAST COMPOSITION:  The breasts are extremely dense, which lowers the sensitivity of mammography.  Spot compression views of the upper outer right breast do not demonstrate any distinct mammographic abnormalities, probably due to the dense nature of the patient's breast. No suspicious masses, architectural distortion, or significant calcifications are detected in the left breast. b/l US: - 6 o'clock axis, 3 cm from the nipple, 0.3 x 0.3 x 0.4 cm ovoid circumscribed hypoechoic mass -9 o'clock axis, 4 cm from the nipple, 1.0 x 0.6 x 0.5 cm ovoid parallel hypoechoic mass with irregular margins. Ultrasound-guided biopsy is recommended. -10 o'clock axis, 4 cm from the nipple, 1.6 x 0.9 x 1.7 cm ovoid parallel hypoechoic mass with irregular margins,  -10 o'clock axis, 4 cm from the nipple, 1.4 x 0.8 x 1.5 cm ovoid parallel hypoechoic mass with irregular margins -10 o'clock axis, 5 cm from the nipple, 2.5 x 0.7 x 1.4 cm irregular hypoechoic mass -10 o'clock axis, 6 cm from the nipple, 2.6 x 1.3 x 1.9 cm irregular hypoechoic mass. -The irregular hypoechoic masses at the 10 o'clock axis, spanning the 4 to 6 cm from the nipple region, appear to be contiguous with one another and can be biopsied simultaneously. -Abnormally thickened right axillary lymph nodes, with the most prominent measuring 1.9 x 0.9 x 1.7 cm. No suspicious solid or complex cystic mass is detected in the left breast. No morphologically abnormal axillary lymph nodes are detected.   3/5/24 US guided biopsy x 3: 1. Right breast 10 o'clock axis, 4-6 cm from the nipple, core biopsy: -Poorly differentiated invasive ductal carcinoma, measuring 0.9 cm in maximal length in this material  ER 0%, [place] 0%, HER2 1+, Ki-67 60% -Ductal carcinoma in situ is also present, intermediate to high nuclear grade, solid pattern with extension into lobules. 2. Right breast 9 o'clock axis, 4 cm from the nipple, core biopsy: -Poorly differentiated invasive ductal carcinoma morphologically similar to specimen 1, measuring 0.5 cm in maximal length in this material. ER 0%, [place] 0%, HER2 0, Ki-67 44% -Ductal carcinoma in situ is also present, intermediate to high nuclear grade, solid pattern, with extension into lobules. 3. Right axilla, core biopsy: Metastatic carcinoma involving lymph node tissue and morphologically similar to specimen 1 and 2  3/26/24: MRI B/L Breast: Biopsy-proven right breast malignancy measuring at least 5.5 cm. Possible satellite upper central right breast, 2 closed the chest wall to biopsy. Recommend surgical/oncologic managment. Metastatic lymph node in the right axilla. No evidence of left breast malignancy.  [de-identified] : poorly differentiated invasive ductal carcinoma [de-identified] : ER-, [place]-, HER2 0-1+, Ki-67 44-60% [de-identified] : Here to continue treatment C2 ddAC + Neulasta with Pembro every 6 wks. Reports fatigue for about four days after starting chemo. Denies fever/chills, denies nausea/vomiting, loose stools, reports feeling more winded after staying in bed for four days after chemo but states now back at her baseline.  Requested to

## 2024-04-17 NOTE — ASSESSMENT
[FreeTextEntry1] : 53 year old perimenopausal woman (LMP 10/2023) with newly diagnosed multifocal triple negative right breast cancer is referred by Dr. Will Trejo to discuss neoadjuvant systemic therapy.  cT3 cN1 Stage IIIC poorly diff IDC ER 0% [place] 0% HER2 renetta 1+ Ki 67 60%   Extensive discussion with patient, her  and son regarding the type of cancer, triple negative, the clinical stage necessitating neoadjuvant chemotherapy together with immunotherapy as per Keynote 522 clinical trial - ddAC with Neulasta [id number] x4 cycles followed by weekly Taxol x12 concurrently with Pembrolizumab Q6wks during this treatment and followed after surgery as well every 3 wks x9 cycles. Mentioned in case of lack of pCR, capecitabine to be recommended. Also will need to see rad onc post op.   Reviewed potential side effects of chemotherapy and immunotherapy providing patient with patient information on each of the drugs above. Reviewed that chemotherapy can cause fatigue, hair loss, allergic/infusion reactions, skin changes, poor appetite, raise risk of infection by causing bone marrow suppression with subsequent low blood counts, potentially needing blood transfusions, also growth factor support to shorten duration of neutropenia, also nausea/vomiting, loose stools, musculoskeletal pain, peripheral neuropathy, infertility and teratogenic effects, as well as small risk of cardiomyopathy/heart failure and small risk of secondary malignancies. Also reviewed side effects that can arise from immunotherapy including but not limited to inflammatory conditions involving different organs, leading to hypothyroidism in case the thyroid is affected by treatment, colitis in case the colon is involved, pericarditis, pneumonitis etc.   Visit 4/3/24: C1 ddAC and pembro ECHO 3/27/24 reviewed: EF 60-65% PET 3/27/24: showed enhancing lesion in R breast c/w her newly diagnosed breast cancer in addition to R axillary LAD. Focal hypermetabolic activity also noted in L colon which warrants GI evaluation at a later date. Patient reminded of need for colonoscopy. (she never had one) Olanzapine, prochlorperazine and lidocaine cream prescriptions sent to her pharmacy. Dietician will see her chairside to address her nutrition questions. She has been cleared for treatment using labs from 3/18/24  Visit 4/17/24: Patient with response to treatment after the first cycle of AC with Neulasta.  Tolerating treatment well with fatigue but otherwise no problems.  To proceed with C2 ddAC with Neulasta Onpro. Olanzapine starting tonight, instructed to take once nightly x4 days total.  Compazine prn nausea, olanzapine 1tab/day for four days starting day of chemo. Instructed to call right away or go to ER in case of fever/temp of 100.4F or higher.   RTC in 2 weeks for f/u and C3.

## 2024-04-18 LAB
ALBUMIN SERPL ELPH-MCNC: 3.9 G/DL
ALP BLD-CCNC: 66 U/L
ALT SERPL-CCNC: 21 U/L
ANION GAP SERPL CALC-SCNC: 9 MMOL/L
AST SERPL-CCNC: 23 U/L
BILIRUB SERPL-MCNC: 0.5 MG/DL
BUN SERPL-MCNC: 12 MG/DL
CALCIUM SERPL-MCNC: 9.5 MG/DL
CHLORIDE SERPL-SCNC: 109 MMOL/L
CO2 SERPL-SCNC: 27 MMOL/L
CREAT SERPL-MCNC: 0.8 MG/DL
EGFR: 88 ML/MIN/1.73M2
GLUCOSE SERPL-MCNC: 138 MG/DL
POTASSIUM SERPL-SCNC: 4 MMOL/L
PROT SERPL-MCNC: 7 G/DL
SODIUM SERPL-SCNC: 145 MMOL/L

## 2024-04-25 ENCOUNTER — OUTPATIENT (OUTPATIENT)
Dept: OUTPATIENT SERVICES | Facility: HOSPITAL | Age: 54
LOS: 1 days | End: 2024-04-25
Payer: COMMERCIAL

## 2024-04-25 ENCOUNTER — RESULT REVIEW (OUTPATIENT)
Age: 54
End: 2024-04-25

## 2024-04-25 DIAGNOSIS — C50.911 MALIGNANT NEOPLASM OF UNSPECIFIED SITE OF RIGHT FEMALE BREAST: ICD-10-CM

## 2024-04-25 LAB — SURGICAL PATHOLOGY STUDY: SIGNIFICANT CHANGE UP

## 2024-04-25 PROCEDURE — 88321 CONSLTJ&REPRT SLD PREP ELSWR: CPT

## 2024-04-26 RX ORDER — SODIUM CHLORIDE 9 MG/ML
10 INJECTION INTRAMUSCULAR; INTRAVENOUS; SUBCUTANEOUS ONCE
Refills: 0 | Status: COMPLETED | OUTPATIENT
Start: 2024-05-01 | End: 2024-05-01

## 2024-04-26 RX ORDER — LIDOCAINE 4 G/100G
1 CREAM TOPICAL ONCE
Refills: 0 | Status: COMPLETED | OUTPATIENT
Start: 2024-05-01 | End: 2024-05-01

## 2024-04-26 RX ORDER — DOXORUBICIN HYDROCHLORIDE 2 MG/ML
99 INJECTION, SOLUTION INTRAVENOUS ONCE
Refills: 0 | Status: COMPLETED | OUTPATIENT
Start: 2024-05-01 | End: 2024-05-01

## 2024-04-26 RX ORDER — PEGFILGRASTIM-CBQV 6 MG/.6ML
6 INJECTION, SOLUTION SUBCUTANEOUS ONCE
Refills: 0 | Status: COMPLETED | OUTPATIENT
Start: 2024-05-01 | End: 2024-05-01

## 2024-04-26 RX ORDER — CYCLOPHOSPHAMIDE 100 MG
996 VIAL (EA) INTRAVENOUS ONCE
Refills: 0 | Status: COMPLETED | OUTPATIENT
Start: 2024-05-01 | End: 2024-05-01

## 2024-05-01 ENCOUNTER — LABORATORY RESULT (OUTPATIENT)
Age: 54
End: 2024-05-01

## 2024-05-01 ENCOUNTER — APPOINTMENT (OUTPATIENT)
Dept: INFUSION THERAPY | Facility: CLINIC | Age: 54
End: 2024-05-01

## 2024-05-01 ENCOUNTER — NON-APPOINTMENT (OUTPATIENT)
Age: 54
End: 2024-05-01

## 2024-05-01 ENCOUNTER — APPOINTMENT (OUTPATIENT)
Dept: HEMATOLOGY ONCOLOGY | Facility: CLINIC | Age: 54
End: 2024-05-01
Payer: COMMERCIAL

## 2024-05-01 ENCOUNTER — OUTPATIENT (OUTPATIENT)
Dept: OUTPATIENT SERVICES | Facility: HOSPITAL | Age: 54
LOS: 1 days | End: 2024-05-01
Payer: COMMERCIAL

## 2024-05-01 ENCOUNTER — TRANSCRIPTION ENCOUNTER (OUTPATIENT)
Age: 54
End: 2024-05-01

## 2024-05-01 VITALS
WEIGHT: 132 LBS | TEMPERATURE: 98.6 F | DIASTOLIC BLOOD PRESSURE: 82 MMHG | OXYGEN SATURATION: 99 % | HEIGHT: 64 IN | HEART RATE: 77 BPM | RESPIRATION RATE: 18 BRPM | BODY MASS INDEX: 22.53 KG/M2 | SYSTOLIC BLOOD PRESSURE: 120 MMHG

## 2024-05-01 VITALS
TEMPERATURE: 99 F | HEART RATE: 77 BPM | OXYGEN SATURATION: 99 % | DIASTOLIC BLOOD PRESSURE: 82 MMHG | WEIGHT: 132.06 LBS | SYSTOLIC BLOOD PRESSURE: 120 MMHG | HEIGHT: 64 IN | RESPIRATION RATE: 18 BRPM

## 2024-05-01 DIAGNOSIS — C50.919 MALIGNANT NEOPLASM OF UNSPECIFIED SITE OF UNSPECIFIED FEMALE BREAST: ICD-10-CM

## 2024-05-01 PROCEDURE — 96411 CHEMO IV PUSH ADDL DRUG: CPT

## 2024-05-01 PROCEDURE — 96413 CHEMO IV INFUSION 1 HR: CPT

## 2024-05-01 PROCEDURE — 96375 TX/PRO/DX INJ NEW DRUG ADDON: CPT

## 2024-05-01 PROCEDURE — 96367 TX/PROPH/DG ADDL SEQ IV INF: CPT

## 2024-05-01 PROCEDURE — 96372 THER/PROPH/DIAG INJ SC/IM: CPT

## 2024-05-01 PROCEDURE — 99214 OFFICE O/P EST MOD 30 MIN: CPT

## 2024-05-01 RX ORDER — PALONOSETRON HYDROCHLORIDE 0.25 MG/5ML
0.25 INJECTION, SOLUTION INTRAVENOUS ONCE
Refills: 0 | Status: COMPLETED | OUTPATIENT
Start: 2024-05-01 | End: 2024-05-01

## 2024-05-01 RX ORDER — FOSAPREPITANT DIMEGLUMINE 150 MG/5ML
150 INJECTION, POWDER, LYOPHILIZED, FOR SOLUTION INTRAVENOUS ONCE
Refills: 0 | Status: COMPLETED | OUTPATIENT
Start: 2024-05-01 | End: 2024-05-01

## 2024-05-01 RX ORDER — DEXAMETHASONE 0.5 MG/5ML
12 ELIXIR ORAL ONCE
Refills: 0 | Status: COMPLETED | OUTPATIENT
Start: 2024-05-01 | End: 2024-05-01

## 2024-05-01 RX ADMIN — Medication 996 MILLIGRAM(S): at 14:55

## 2024-05-01 RX ADMIN — SODIUM CHLORIDE 10 MILLILITER(S): 9 INJECTION INTRAMUSCULAR; INTRAVENOUS; SUBCUTANEOUS at 15:32

## 2024-05-01 RX ADMIN — DOXORUBICIN HYDROCHLORIDE 594 MILLIGRAM(S): 2 INJECTION, SOLUTION INTRAVENOUS at 14:30

## 2024-05-01 RX ADMIN — FOSAPREPITANT DIMEGLUMINE 150 MILLIGRAM(S): 150 INJECTION, POWDER, LYOPHILIZED, FOR SOLUTION INTRAVENOUS at 14:05

## 2024-05-01 RX ADMIN — Medication 212 MILLIGRAM(S): at 13:15

## 2024-05-01 RX ADMIN — PALONOSETRON HYDROCHLORIDE 0.25 MILLIGRAM(S): 0.25 INJECTION, SOLUTION INTRAVENOUS at 13:32

## 2024-05-01 RX ADMIN — Medication 996 MILLIGRAM(S): at 15:30

## 2024-05-01 RX ADMIN — Medication 12 MILLIGRAM(S): at 13:30

## 2024-05-01 RX ADMIN — FOSAPREPITANT DIMEGLUMINE 500 MILLIGRAM(S): 150 INJECTION, POWDER, LYOPHILIZED, FOR SOLUTION INTRAVENOUS at 13:35

## 2024-05-01 RX ADMIN — PEGFILGRASTIM-CBQV 6 MILLIGRAM(S): 6 INJECTION, SOLUTION SUBCUTANEOUS at 15:38

## 2024-05-02 LAB
ALBUMIN SERPL ELPH-MCNC: 3.7 G/DL
ALP BLD-CCNC: 66 U/L
ALT SERPL-CCNC: 13 U/L
ANION GAP SERPL CALC-SCNC: 4 MMOL/L
AST SERPL-CCNC: 19 U/L
BILIRUB SERPL-MCNC: 0.4 MG/DL
BUN SERPL-MCNC: 10 MG/DL
CALCIUM SERPL-MCNC: 9.7 MG/DL
CHLORIDE SERPL-SCNC: 109 MMOL/L
CO2 SERPL-SCNC: 29 MMOL/L
CREAT SERPL-MCNC: 0.6 MG/DL
EGFR: 107 ML/MIN/1.73M2
GLUCOSE SERPL-MCNC: 114 MG/DL
POTASSIUM SERPL-SCNC: 4.3 MMOL/L
PROT SERPL-MCNC: 7.1 G/DL
SODIUM SERPL-SCNC: 142 MMOL/L

## 2024-05-13 RX ORDER — SODIUM CHLORIDE 9 MG/ML
10 INJECTION INTRAMUSCULAR; INTRAVENOUS; SUBCUTANEOUS ONCE
Refills: 0 | Status: COMPLETED | OUTPATIENT
Start: 2024-05-15 | End: 2024-05-15

## 2024-05-13 RX ORDER — PEGFILGRASTIM-CBQV 6 MG/.6ML
6 INJECTION, SOLUTION SUBCUTANEOUS ONCE
Refills: 0 | Status: COMPLETED | OUTPATIENT
Start: 2024-05-15 | End: 2024-05-15

## 2024-05-14 ENCOUNTER — NON-APPOINTMENT (OUTPATIENT)
Age: 54
End: 2024-05-14

## 2024-05-15 ENCOUNTER — LABORATORY RESULT (OUTPATIENT)
Age: 54
End: 2024-05-15

## 2024-05-15 ENCOUNTER — OUTPATIENT (OUTPATIENT)
Dept: OUTPATIENT SERVICES | Facility: HOSPITAL | Age: 54
LOS: 1 days | End: 2024-05-15
Payer: COMMERCIAL

## 2024-05-15 ENCOUNTER — APPOINTMENT (OUTPATIENT)
Dept: HEMATOLOGY ONCOLOGY | Facility: CLINIC | Age: 54
End: 2024-05-15
Payer: COMMERCIAL

## 2024-05-15 ENCOUNTER — APPOINTMENT (OUTPATIENT)
Dept: INFUSION THERAPY | Facility: CLINIC | Age: 54
End: 2024-05-15

## 2024-05-15 VITALS
SYSTOLIC BLOOD PRESSURE: 120 MMHG | OXYGEN SATURATION: 100 % | RESPIRATION RATE: 18 BRPM | TEMPERATURE: 98.8 F | DIASTOLIC BLOOD PRESSURE: 79 MMHG | BODY MASS INDEX: 22.53 KG/M2 | WEIGHT: 132 LBS | HEART RATE: 78 BPM | HEIGHT: 64 IN

## 2024-05-15 VITALS
SYSTOLIC BLOOD PRESSURE: 124 MMHG | TEMPERATURE: 98 F | DIASTOLIC BLOOD PRESSURE: 76 MMHG | HEART RATE: 76 BPM | OXYGEN SATURATION: 99 % | RESPIRATION RATE: 17 BRPM

## 2024-05-15 VITALS
OXYGEN SATURATION: 98 % | HEIGHT: 64 IN | RESPIRATION RATE: 16 BRPM | WEIGHT: 132.06 LBS | HEART RATE: 78 BPM | TEMPERATURE: 99 F | DIASTOLIC BLOOD PRESSURE: 79 MMHG | SYSTOLIC BLOOD PRESSURE: 120 MMHG

## 2024-05-15 DIAGNOSIS — C50.919 MALIGNANT NEOPLASM OF UNSPECIFIED SITE OF UNSPECIFIED FEMALE BREAST: ICD-10-CM

## 2024-05-15 PROCEDURE — 96411 CHEMO IV PUSH ADDL DRUG: CPT

## 2024-05-15 PROCEDURE — 96417 CHEMO IV INFUS EACH ADDL SEQ: CPT

## 2024-05-15 PROCEDURE — 96413 CHEMO IV INFUSION 1 HR: CPT

## 2024-05-15 PROCEDURE — 96375 TX/PRO/DX INJ NEW DRUG ADDON: CPT

## 2024-05-15 PROCEDURE — 96367 TX/PROPH/DG ADDL SEQ IV INF: CPT

## 2024-05-15 PROCEDURE — 99214 OFFICE O/P EST MOD 30 MIN: CPT

## 2024-05-15 PROCEDURE — 96372 THER/PROPH/DIAG INJ SC/IM: CPT

## 2024-05-15 RX ORDER — PEMBROLIZUMAB 25 MG/ML
400 INJECTION, SOLUTION INTRAVENOUS ONCE
Refills: 0 | Status: COMPLETED | OUTPATIENT
Start: 2024-05-15 | End: 2024-05-15

## 2024-05-15 RX ORDER — PALONOSETRON HYDROCHLORIDE 0.25 MG/5ML
0.25 INJECTION, SOLUTION INTRAVENOUS ONCE
Refills: 0 | Status: COMPLETED | OUTPATIENT
Start: 2024-05-15 | End: 2024-05-15

## 2024-05-15 RX ORDER — LIDOCAINE 4 G/100G
1 CREAM TOPICAL ONCE
Refills: 0 | Status: COMPLETED | OUTPATIENT
Start: 2024-05-15 | End: 2024-05-15

## 2024-05-15 RX ORDER — DOXORUBICIN HYDROCHLORIDE 2 MG/ML
99 INJECTION, SOLUTION INTRAVENOUS ONCE
Refills: 0 | Status: COMPLETED | OUTPATIENT
Start: 2024-05-15 | End: 2024-05-15

## 2024-05-15 RX ORDER — FOSAPREPITANT DIMEGLUMINE 150 MG/5ML
150 INJECTION, POWDER, LYOPHILIZED, FOR SOLUTION INTRAVENOUS ONCE
Refills: 0 | Status: COMPLETED | OUTPATIENT
Start: 2024-05-15 | End: 2024-05-15

## 2024-05-15 RX ORDER — CYCLOPHOSPHAMIDE 100 MG
996 VIAL (EA) INTRAVENOUS ONCE
Refills: 0 | Status: COMPLETED | OUTPATIENT
Start: 2024-05-15 | End: 2024-05-15

## 2024-05-15 RX ORDER — DEXAMETHASONE 0.5 MG/5ML
12 ELIXIR ORAL ONCE
Refills: 0 | Status: COMPLETED | OUTPATIENT
Start: 2024-05-15 | End: 2024-05-15

## 2024-05-15 RX ADMIN — FOSAPREPITANT DIMEGLUMINE 150 MILLIGRAM(S): 150 INJECTION, POWDER, LYOPHILIZED, FOR SOLUTION INTRAVENOUS at 13:28

## 2024-05-15 RX ADMIN — PEGFILGRASTIM-CBQV 6 MILLIGRAM(S): 6 INJECTION, SOLUTION SUBCUTANEOUS at 14:28

## 2024-05-15 RX ADMIN — PEMBROLIZUMAB 400 MILLIGRAM(S): 25 INJECTION, SOLUTION INTRAVENOUS at 12:45

## 2024-05-15 RX ADMIN — SODIUM CHLORIDE 10 MILLILITER(S): 9 INJECTION INTRAMUSCULAR; INTRAVENOUS; SUBCUTANEOUS at 14:27

## 2024-05-15 RX ADMIN — Medication 996 MILLIGRAM(S): at 14:25

## 2024-05-15 RX ADMIN — Medication 212 MILLIGRAM(S): at 13:29

## 2024-05-15 RX ADMIN — FOSAPREPITANT DIMEGLUMINE 500 MILLIGRAM(S): 150 INJECTION, POWDER, LYOPHILIZED, FOR SOLUTION INTRAVENOUS at 12:55

## 2024-05-15 RX ADMIN — PALONOSETRON HYDROCHLORIDE 0.25 MILLIGRAM(S): 0.25 INJECTION, SOLUTION INTRAVENOUS at 12:50

## 2024-05-15 RX ADMIN — Medication 12 MILLIGRAM(S): at 13:45

## 2024-05-15 RX ADMIN — PEMBROLIZUMAB 400 MILLIGRAM(S): 25 INJECTION, SOLUTION INTRAVENOUS at 12:15

## 2024-05-15 RX ADMIN — DOXORUBICIN HYDROCHLORIDE 594 MILLIGRAM(S): 2 INJECTION, SOLUTION INTRAVENOUS at 13:50

## 2024-05-15 RX ADMIN — Medication 996 MILLIGRAM(S): at 13:55

## 2024-05-15 NOTE — PHYSICAL EXAM
[Restricted in physically strenuous activity but ambulatory and able to carry out work of a light or sedentary nature] : Status 1- Restricted in physically strenuous activity but ambulatory and able to carry out work of a light or sedentary nature, e.g., light house work, office work [Normal] : affect appropriate [de-identified] : down from 5cm to about 3cm, mobile hard mass

## 2024-05-15 NOTE — PHYSICAL EXAM
[Restricted in physically strenuous activity but ambulatory and able to carry out work of a light or sedentary nature] : Status 1- Restricted in physically strenuous activity but ambulatory and able to carry out work of a light or sedentary nature, e.g., light house work, office work [Normal] : affect appropriate [de-identified] : down from 5cm to about 3cm, mobile hard mass

## 2024-05-15 NOTE — HISTORY OF PRESENT ILLNESS
[Disease: _____________________] : Disease: [unfilled] [de-identified] : 53 year old female with newly diagnosed multifocal triple negative right breast cancer is referred by Dr. Will Trejo to discuss neoadjuvant systemic therapy. Here today for f/u and tx.   Patient palpated a lump in right breast.  2/21/24 R breast US: New 3 mm hypoechoic mass right 6:00 3 cm from the nipple. New 1 cm hypoechoic mass with indistinct margins right 9:00 4 cm from the nipple . 1.5 x 0.9 x 1.7 cm irregular hypoechoic mass right 10:00 4 cm from the nipple underlying region of palpable mass.  New 1.9 x 0.4 cm irregular hypoechoic mass right 10:00 5 cm from the nipple underlying region of palpable mass. New 1.6 x 1.2 x 1.8 cm irregular hypoechoic mass with cystic component right 10:00 6 cm from the nipple underlying region of palpable mass. Lymph nodes with thickened cortex right axillary region.  3/5/24 b/l dx MG BREAST COMPOSITION:  The breasts are extremely dense, which lowers the sensitivity of mammography.  Spot compression views of the upper outer right breast do not demonstrate any distinct mammographic abnormalities, probably due to the dense nature of the patient's breast. No suspicious masses, architectural distortion, or significant calcifications are detected in the left breast. b/l US: - 6 o'clock axis, 3 cm from the nipple, 0.3 x 0.3 x 0.4 cm ovoid circumscribed hypoechoic mass -9 o'clock axis, 4 cm from the nipple, 1.0 x 0.6 x 0.5 cm ovoid parallel hypoechoic mass with irregular margins. Ultrasound-guided biopsy is recommended. -10 o'clock axis, 4 cm from the nipple, 1.6 x 0.9 x 1.7 cm ovoid parallel hypoechoic mass with irregular margins,  -10 o'clock axis, 4 cm from the nipple, 1.4 x 0.8 x 1.5 cm ovoid parallel hypoechoic mass with irregular margins -10 o'clock axis, 5 cm from the nipple, 2.5 x 0.7 x 1.4 cm irregular hypoechoic mass -10 o'clock axis, 6 cm from the nipple, 2.6 x 1.3 x 1.9 cm irregular hypoechoic mass. -The irregular hypoechoic masses at the 10 o'clock axis, spanning the 4 to 6 cm from the nipple region, appear to be contiguous with one another and can be biopsied simultaneously. -Abnormally thickened right axillary lymph nodes, with the most prominent measuring 1.9 x 0.9 x 1.7 cm. No suspicious solid or complex cystic mass is detected in the left breast. No morphologically abnormal axillary lymph nodes are detected.   3/5/24 US guided biopsy x 3: 1. Right breast 10 o'clock axis, 4-6 cm from the nipple, core biopsy: -Poorly differentiated invasive ductal carcinoma, measuring 0.9 cm in maximal length in this material  ER 0%, CT 0%, HER2 1+, Ki-67 60% -Ductal carcinoma in situ is also present, intermediate to high nuclear grade, solid pattern with extension into lobules. 2. Right breast 9 o'clock axis, 4 cm from the nipple, core biopsy: -Poorly differentiated invasive ductal carcinoma morphologically similar to specimen 1, measuring 0.5 cm in maximal length in this material. ER 0%, CT 0%, HER2 0, Ki-67 44% -Ductal carcinoma in situ is also present, intermediate to high nuclear grade, solid pattern, with extension into lobules. 3. Right axilla, core biopsy: Metastatic carcinoma involving lymph node tissue and morphologically similar to specimen 1 and 2  3/26/24: MRI B/L Breast: Biopsy-proven right breast malignancy measuring at least 5.5 cm. Possible satellite upper central right breast, 2 closed the chest wall to biopsy. Recommend surgical/oncologic managment. Metastatic lymph node in the right axilla. No evidence of left breast malignancy.  [de-identified] : poorly differentiated invasive ductal carcinoma [de-identified] : ER-, [place]-, HER2 0-1+, Ki-67 44-60% [de-identified] : Here to continue treatment C3 ddAC + Neulasta with Pembro every 6 wks. Reports improvement in fatigue since she started taking her anti-nausea medication at night. Reports mild nausea and changes in taste, but no change in appetite and weight is stable. Reports nail discoloration. Denies diarrhea, vomiting, neuropathy, rash.

## 2024-05-15 NOTE — HISTORY OF PRESENT ILLNESS
[Disease: _____________________] : Disease: [unfilled] [de-identified] : 53 year old female with newly diagnosed multifocal triple negative right breast cancer is referred by Dr. Will Trjeo to discuss neoadjuvant systemic therapy. Here today for f/u and tx.   Patient palpated a lump in right breast.  2/21/24 R breast US: New 3 mm hypoechoic mass right 6:00 3 cm from the nipple. New 1 cm hypoechoic mass with indistinct margins right 9:00 4 cm from the nipple . 1.5 x 0.9 x 1.7 cm irregular hypoechoic mass right 10:00 4 cm from the nipple underlying region of palpable mass.  New 1.9 x 0.4 cm irregular hypoechoic mass right 10:00 5 cm from the nipple underlying region of palpable mass. New 1.6 x 1.2 x 1.8 cm irregular hypoechoic mass with cystic component right 10:00 6 cm from the nipple underlying region of palpable mass. Lymph nodes with thickened cortex right axillary region.  3/5/24 b/l dx MG BREAST COMPOSITION:  The breasts are extremely dense, which lowers the sensitivity of mammography.  Spot compression views of the upper outer right breast do not demonstrate any distinct mammographic abnormalities, probably due to the dense nature of the patient's breast. No suspicious masses, architectural distortion, or significant calcifications are detected in the left breast. b/l US: - 6 o'clock axis, 3 cm from the nipple, 0.3 x 0.3 x 0.4 cm ovoid circumscribed hypoechoic mass -9 o'clock axis, 4 cm from the nipple, 1.0 x 0.6 x 0.5 cm ovoid parallel hypoechoic mass with irregular margins. Ultrasound-guided biopsy is recommended. -10 o'clock axis, 4 cm from the nipple, 1.6 x 0.9 x 1.7 cm ovoid parallel hypoechoic mass with irregular margins,  -10 o'clock axis, 4 cm from the nipple, 1.4 x 0.8 x 1.5 cm ovoid parallel hypoechoic mass with irregular margins -10 o'clock axis, 5 cm from the nipple, 2.5 x 0.7 x 1.4 cm irregular hypoechoic mass -10 o'clock axis, 6 cm from the nipple, 2.6 x 1.3 x 1.9 cm irregular hypoechoic mass. -The irregular hypoechoic masses at the 10 o'clock axis, spanning the 4 to 6 cm from the nipple region, appear to be contiguous with one another and can be biopsied simultaneously. -Abnormally thickened right axillary lymph nodes, with the most prominent measuring 1.9 x 0.9 x 1.7 cm. No suspicious solid or complex cystic mass is detected in the left breast. No morphologically abnormal axillary lymph nodes are detected.   3/5/24 US guided biopsy x 3: 1. Right breast 10 o'clock axis, 4-6 cm from the nipple, core biopsy: -Poorly differentiated invasive ductal carcinoma, measuring 0.9 cm in maximal length in this material  ER 0%, [place] 0%, HER2 1+, Ki-67 60% -Ductal carcinoma in situ is also present, intermediate to high nuclear grade, solid pattern with extension into lobules. 2. Right breast 9 o'clock axis, 4 cm from the nipple, core biopsy: -Poorly differentiated invasive ductal carcinoma morphologically similar to specimen 1, measuring 0.5 cm in maximal length in this material. ER 0%, [place] 0%, HER2 0, Ki-67 44% -Ductal carcinoma in situ is also present, intermediate to high nuclear grade, solid pattern, with extension into lobules. 3. Right axilla, core biopsy: Metastatic carcinoma involving lymph node tissue and morphologically similar to specimen 1 and 2  3/26/24: MRI B/L Breast: Biopsy-proven right breast malignancy measuring at least 5.5 cm. Possible satellite upper central right breast, 2 closed the chest wall to biopsy. Recommend surgical/oncologic managment. Metastatic lymph node in the right axilla. No evidence of left breast malignancy.  [de-identified] : poorly differentiated invasive ductal carcinoma [de-identified] : ER-, [place]-, HER2 0-1+, Ki-67 44-60% [de-identified] : Here to continue treatment C4 ddAC + Neulasta with Pembro every 6 wks.  Reports mild nausea and changes in taste, but no change in appetite and weight is stable. Reports nail discoloration. Denies diarrhea, vomiting, neuropathy, rash.

## 2024-05-15 NOTE — ASSESSMENT
[FreeTextEntry1] : 53 year old perimenopausal woman (LMP 10/2023) with newly diagnosed multifocal triple negative right breast cancer is referred by Dr. Will Trejo to discuss neoadjuvant systemic therapy.  cT3 cN1 Stage IIIC poorly diff IDC ER 0% [place] 0% HER2 renetta 1+ Ki 67 60%   Extensive discussion with patient, her  and son regarding the type of cancer, triple negative, the clinical stage necessitating neoadjuvant chemotherapy together with immunotherapy as per Keynote 522 clinical trial - ddAC with Neulasta [id number] x4 cycles followed by weekly Carboplatin/Taxol x12 concurrently with Pembrolizumab Q6wks during this treatment and followed after surgery as well every 3 wks x9 cycles. Mentioned in case of lack of pCR, capecitabine to be recommended. Also will need to see rad onc post op.   Reviewed potential side effects of chemotherapy and immunotherapy providing patient with patient information on each of the drugs above. Reviewed that chemotherapy can cause fatigue, hair loss, allergic/infusion reactions, skin changes, poor appetite, raise risk of infection by causing bone marrow suppression with subsequent low blood counts, potentially needing blood transfusions, also growth factor support to shorten duration of neutropenia, also nausea/vomiting, loose stools, musculoskeletal pain, peripheral neuropathy, infertility and teratogenic effects, as well as small risk of cardiomyopathy/heart failure and small risk of secondary malignancies. Also reviewed side effects that can arise from immunotherapy including but not limited to inflammatory conditions involving different organs, leading to hypothyroidism in case the thyroid is affected by treatment, colitis in case the colon is involved, pericarditis, pneumonitis etc.   Visit 4/3/24: C1 ddAC and pembro ECHO 3/27/24 reviewed: EF 60-65% PET 3/27/24: showed enhancing lesion in R breast c/w her newly diagnosed breast cancer in addition to R axillary LAD. Focal hypermetabolic activity also noted in L colon which warrants GI evaluation at a later date. Patient reminded of need for colonoscopy. (she never had one) Olanzapine, prochlorperazine and lidocaine cream prescriptions sent to her pharmacy. Dietician will see her chairside to address her nutrition questions. She has been cleared for treatment using labs from 3/18/24  Visit 4/17/24: Patient with response to treatment after the first cycle of AC with Neulasta.  Tolerating treatment well with fatigue but otherwise no problems.  To proceed with C2 ddAC with Neulasta Onpro. Olanzapine starting tonight, instructed to take once nightly x4 days total.  Compazine prn nausea, olanzapine 1tab/day for four days starting day of chemo. Instructed to call right away or go to ER in case of fever/temp of 100.4F or higher.   Visit 5/1/24: Labs reviewed and patient cleared for C3 ddAC with Neulasta Onpro. Reports minimal nausea and nail discoloration, otherwise no other reported AEs. RTC 5/15 for follow up and C4  5/15/24 Labs reviewed and patient cleared for C4 ddAC with Neulasta Onpro. She is so far tolerating the treatment well.  Reviewed side effects of weekly Paclitaxel, Carboplatin, and additional questions regarding the next phase of the treatment were answered. Pembrolizumab to continue [id number]. Reassurance provided regarding concerns about dietary restrictions. Consent obtained. RTC 5/29 for follow up and to start weekly TC

## 2024-05-15 NOTE — ASSESSMENT
[FreeTextEntry1] : 53 year old perimenopausal woman (LMP 10/2023) with newly diagnosed multifocal triple negative right breast cancer is referred by Dr. Will Trejo to discuss neoadjuvant systemic therapy.  cT3 cN1 Stage IIIC poorly diff IDC ER 0% OR 0% HER2 renetta 1+ Ki 67 60%   Extensive discussion with patient, her  and son regarding the type of cancer, triple negative, the clinical stage necessitating neoadjuvant chemotherapy together with immunotherapy as per Keynote 522 clinical trial - ddAC with Neulasta [id number] x4 cycles followed by weekly Taxol x12 concurrently with Pembrolizumab Q6wks during this treatment and followed after surgery as well every 3 wks x9 cycles. Mentioned in case of lack of pCR, capecitabine to be recommended. Also will need to see rad onc post op.   Reviewed potential side effects of chemotherapy and immunotherapy providing patient with patient information on each of the drugs above. Reviewed that chemotherapy can cause fatigue, hair loss, allergic/infusion reactions, skin changes, poor appetite, raise risk of infection by causing bone marrow suppression with subsequent low blood counts, potentially needing blood transfusions, also growth factor support to shorten duration of neutropenia, also nausea/vomiting, loose stools, musculoskeletal pain, peripheral neuropathy, infertility and teratogenic effects, as well as small risk of cardiomyopathy/heart failure and small risk of secondary malignancies. Also reviewed side effects that can arise from immunotherapy including but not limited to inflammatory conditions involving different organs, leading to hypothyroidism in case the thyroid is affected by treatment, colitis in case the colon is involved, pericarditis, pneumonitis etc.   Visit 4/3/24: C1 ddAC and pembro ECHO 3/27/24 reviewed: EF 60-65% PET 3/27/24: showed enhancing lesion in R breast c/w her newly diagnosed breast cancer in addition to R axillary LAD. Focal hypermetabolic activity also noted in L colon which warrants GI evaluation at a later date. Patient reminded of need for colonoscopy. (she never had one) Olanzapine, prochlorperazine and lidocaine cream prescriptions sent to her pharmacy. Dietician will see her chairside to address her nutrition questions. She has been cleared for treatment using labs from 3/18/24  Visit 4/17/24: Patient with response to treatment after the first cycle of AC with Neulasta.  Tolerating treatment well with fatigue but otherwise no problems.  To proceed with C2 ddAC with Neulasta Onpro. Olanzapine starting tonight, instructed to take once nightly x4 days total.  Compazine prn nausea, olanzapine 1tab/day for four days starting day of chemo. Instructed to call right away or go to ER in case of fever/temp of 100.4F or higher.   Visit 5/1/24: Labs reviewed and patient cleared for C3 ddAC with Neulasta Onpro. Reports minimal nausea and nail discoloration, otherwise no other reported AEs. RTC 5/15 for follow up and C4

## 2024-05-15 NOTE — PHARMACY COMMUNICATION NOTE - COMMENTS
Doxorubicin cumulative dose including today's (5/15/2024) dose = 240mg/m2.     LVEF from 3/27/2024 = 60-65%.  Doxorubicin cumulative dose including today's (5/15/2024) dose = 240mg/m2.     LVEF from 3/27/2024 = 60-65%.     Also, of note patient cleared to receive Keytruda today without TFT results. TSH was drawn in Med Onc but not resulted yet. No baseline level was drawn on 4/3/2024.

## 2024-05-17 LAB
ALBUMIN SERPL ELPH-MCNC: 4 G/DL
ALP BLD-CCNC: 72 U/L
ALT SERPL-CCNC: 9 U/L
ANION GAP SERPL CALC-SCNC: 10 MMOL/L
AST SERPL-CCNC: 23 U/L
BILIRUB SERPL-MCNC: 0.4 MG/DL
BUN SERPL-MCNC: 13 MG/DL
CALCIUM SERPL-MCNC: 9.8 MG/DL
CHLORIDE SERPL-SCNC: 109 MMOL/L
CO2 SERPL-SCNC: 27 MMOL/L
CREAT SERPL-MCNC: 0.7 MG/DL
EGFR: 103 ML/MIN/1.73M2
GLUCOSE SERPL-MCNC: 137 MG/DL
POTASSIUM SERPL-SCNC: 3.7 MMOL/L
PROT SERPL-MCNC: 7 G/DL
SODIUM SERPL-SCNC: 146 MMOL/L
TSH SERPL-ACNC: 0.68 UIU/ML

## 2024-05-28 RX ORDER — CARBOPLATIN 50 MG
131 VIAL (EA) INTRAVENOUS ONCE
Refills: 0 | Status: COMPLETED | OUTPATIENT
Start: 2024-05-29 | End: 2024-05-29

## 2024-05-29 ENCOUNTER — OUTPATIENT (OUTPATIENT)
Dept: OUTPATIENT SERVICES | Facility: HOSPITAL | Age: 54
LOS: 1 days | End: 2024-05-29
Payer: COMMERCIAL

## 2024-05-29 ENCOUNTER — APPOINTMENT (OUTPATIENT)
Dept: HEMATOLOGY ONCOLOGY | Facility: CLINIC | Age: 54
End: 2024-05-29
Payer: COMMERCIAL

## 2024-05-29 ENCOUNTER — APPOINTMENT (OUTPATIENT)
Dept: INFUSION THERAPY | Facility: CLINIC | Age: 54
End: 2024-05-29

## 2024-05-29 VITALS
RESPIRATION RATE: 18 BRPM | OXYGEN SATURATION: 96 % | HEART RATE: 88 BPM | SYSTOLIC BLOOD PRESSURE: 131 MMHG | TEMPERATURE: 99 F | WEIGHT: 134.04 LBS | DIASTOLIC BLOOD PRESSURE: 76 MMHG | HEIGHT: 64 IN

## 2024-05-29 VITALS
WEIGHT: 134 LBS | HEART RATE: 88 BPM | DIASTOLIC BLOOD PRESSURE: 76 MMHG | BODY MASS INDEX: 22.88 KG/M2 | HEIGHT: 64 IN | TEMPERATURE: 98.7 F | RESPIRATION RATE: 18 BRPM | SYSTOLIC BLOOD PRESSURE: 131 MMHG | OXYGEN SATURATION: 96 %

## 2024-05-29 DIAGNOSIS — C50.919 MALIGNANT NEOPLASM OF UNSPECIFIED SITE OF UNSPECIFIED FEMALE BREAST: ICD-10-CM

## 2024-05-29 DIAGNOSIS — R05.9 COUGH, UNSPECIFIED: ICD-10-CM

## 2024-05-29 LAB
ALBUMIN SERPL ELPH-MCNC: 3.5 G/DL
ALP BLD-CCNC: 67 U/L
ALT SERPL-CCNC: 6 U/L
ANION GAP SERPL CALC-SCNC: 12 MMOL/L
AST SERPL-CCNC: 16 U/L
BILIRUB SERPL-MCNC: 0.4 MG/DL
BUN SERPL-MCNC: 9 MG/DL
CALCIUM SERPL-MCNC: 9.5 MG/DL
CHLORIDE SERPL-SCNC: 106 MMOL/L
CO2 SERPL-SCNC: 27 MMOL/L
CREAT SERPL-MCNC: 0.6 MG/DL
EGFR: 107 ML/MIN/1.73M2
GLUCOSE SERPL-MCNC: 127 MG/DL
HCT VFR BLD CALC: 24.6 %
HGB BLD-MCNC: 8 G/DL
LYMPHOCYTES # BLD AUTO: 0.4 K/UL
LYMPHOCYTES NFR BLD AUTO: 5.6 %
MAN DIFF?: NO
MCHC RBC-ENTMCNC: 30 PG
MCHC RBC-ENTMCNC: 32.5 GM/DL
MCV RBC AUTO: 92.1 FL
NEUTROPHILS # BLD AUTO: 6.6 K/UL
NEUTROPHILS NFR BLD AUTO: 82.1 %
PLATELET # BLD AUTO: 225 K/UL
POTASSIUM SERPL-SCNC: 3.6 MMOL/L
PROT SERPL-MCNC: 6.9 G/DL
RBC # BLD: 2.67 M/UL
RBC # FLD: 13.9 %
SODIUM SERPL-SCNC: 145 MMOL/L
WBC # FLD AUTO: 8 K/UL

## 2024-05-29 PROCEDURE — 96413 CHEMO IV INFUSION 1 HR: CPT

## 2024-05-29 PROCEDURE — 99214 OFFICE O/P EST MOD 30 MIN: CPT

## 2024-05-29 PROCEDURE — 96417 CHEMO IV INFUS EACH ADDL SEQ: CPT

## 2024-05-29 PROCEDURE — 96375 TX/PRO/DX INJ NEW DRUG ADDON: CPT

## 2024-05-29 RX ORDER — PACLITAXEL 6 MG/ML
132 INJECTION, SOLUTION, CONCENTRATE INTRAVENOUS ONCE
Refills: 0 | Status: COMPLETED | OUTPATIENT
Start: 2024-05-29 | End: 2024-05-29

## 2024-05-29 RX ORDER — DIPHENHYDRAMINE HCL 50 MG
25 CAPSULE ORAL ONCE
Refills: 0 | Status: COMPLETED | OUTPATIENT
Start: 2024-05-29 | End: 2024-05-29

## 2024-05-29 RX ORDER — LIDOCAINE 4 G/100G
1 CREAM TOPICAL ONCE
Refills: 0 | Status: COMPLETED | OUTPATIENT
Start: 2024-05-29 | End: 2024-05-29

## 2024-05-29 RX ORDER — PALONOSETRON HYDROCHLORIDE 0.25 MG/5ML
0.25 INJECTION, SOLUTION INTRAVENOUS ONCE
Refills: 0 | Status: COMPLETED | OUTPATIENT
Start: 2024-05-29 | End: 2024-05-29

## 2024-05-29 RX ORDER — FAMOTIDINE 10 MG/ML
20 INJECTION INTRAVENOUS ONCE
Refills: 0 | Status: COMPLETED | OUTPATIENT
Start: 2024-05-29 | End: 2024-05-29

## 2024-05-29 RX ORDER — DEXAMETHASONE 0.5 MG/5ML
10 ELIXIR ORAL ONCE
Refills: 0 | Status: COMPLETED | OUTPATIENT
Start: 2024-05-29 | End: 2024-05-29

## 2024-05-29 RX ORDER — SODIUM CHLORIDE 9 MG/ML
10 INJECTION INTRAMUSCULAR; INTRAVENOUS; SUBCUTANEOUS ONCE
Refills: 0 | Status: COMPLETED | OUTPATIENT
Start: 2024-05-29 | End: 2024-05-29

## 2024-05-29 RX ADMIN — Medication 131 MILLIGRAM(S): at 17:41

## 2024-05-29 RX ADMIN — Medication 10 MILLIGRAM(S): at 13:31

## 2024-05-29 RX ADMIN — SODIUM CHLORIDE 10 MILLILITER(S): 9 INJECTION INTRAMUSCULAR; INTRAVENOUS; SUBCUTANEOUS at 17:41

## 2024-05-29 RX ADMIN — PALONOSETRON HYDROCHLORIDE 0.25 MILLIGRAM(S): 0.25 INJECTION, SOLUTION INTRAVENOUS at 13:43

## 2024-05-29 RX ADMIN — PACLITAXEL 132 MILLIGRAM(S): 6 INJECTION, SOLUTION, CONCENTRATE INTRAVENOUS at 13:54

## 2024-05-29 RX ADMIN — PACLITAXEL 132 MILLIGRAM(S): 6 INJECTION, SOLUTION, CONCENTRATE INTRAVENOUS at 17:05

## 2024-05-29 RX ADMIN — Medication 25 MILLIGRAM(S): at 13:47

## 2024-05-29 RX ADMIN — Medication 131 MILLIGRAM(S): at 17:03

## 2024-05-29 RX ADMIN — Medication 25 MILLIGRAM(S): at 14:21

## 2024-05-29 RX ADMIN — Medication 204 MILLIGRAM(S): at 13:16

## 2024-05-29 RX ADMIN — FAMOTIDINE 20 MILLIGRAM(S): 10 INJECTION INTRAVENOUS at 13:52

## 2024-05-29 RX ADMIN — Medication 202 MILLIGRAM(S): at 13:32

## 2024-05-29 NOTE — HISTORY OF PRESENT ILLNESS
[Disease: _____________________] : Disease: [unfilled] [de-identified] : 53 year old female with newly diagnosed multifocal triple negative right breast cancer is referred by Dr. Will Trejo to discuss neoadjuvant systemic therapy. Here today for f/u and tx.   Patient palpated a lump in right breast.  2/21/24 R breast US: New 3 mm hypoechoic mass right 6:00 3 cm from the nipple. New 1 cm hypoechoic mass with indistinct margins right 9:00 4 cm from the nipple . 1.5 x 0.9 x 1.7 cm irregular hypoechoic mass right 10:00 4 cm from the nipple underlying region of palpable mass.  New 1.9 x 0.4 cm irregular hypoechoic mass right 10:00 5 cm from the nipple underlying region of palpable mass. New 1.6 x 1.2 x 1.8 cm irregular hypoechoic mass with cystic component right 10:00 6 cm from the nipple underlying region of palpable mass. Lymph nodes with thickened cortex right axillary region.  3/5/24 b/l dx MG BREAST COMPOSITION:  The breasts are extremely dense, which lowers the sensitivity of mammography.  Spot compression views of the upper outer right breast do not demonstrate any distinct mammographic abnormalities, probably due to the dense nature of the patient's breast. No suspicious masses, architectural distortion, or significant calcifications are detected in the left breast. b/l US: - 6 o'clock axis, 3 cm from the nipple, 0.3 x 0.3 x 0.4 cm ovoid circumscribed hypoechoic mass -9 o'clock axis, 4 cm from the nipple, 1.0 x 0.6 x 0.5 cm ovoid parallel hypoechoic mass with irregular margins. Ultrasound-guided biopsy is recommended. -10 o'clock axis, 4 cm from the nipple, 1.6 x 0.9 x 1.7 cm ovoid parallel hypoechoic mass with irregular margins,  -10 o'clock axis, 4 cm from the nipple, 1.4 x 0.8 x 1.5 cm ovoid parallel hypoechoic mass with irregular margins -10 o'clock axis, 5 cm from the nipple, 2.5 x 0.7 x 1.4 cm irregular hypoechoic mass -10 o'clock axis, 6 cm from the nipple, 2.6 x 1.3 x 1.9 cm irregular hypoechoic mass. -The irregular hypoechoic masses at the 10 o'clock axis, spanning the 4 to 6 cm from the nipple region, appear to be contiguous with one another and can be biopsied simultaneously. -Abnormally thickened right axillary lymph nodes, with the most prominent measuring 1.9 x 0.9 x 1.7 cm. No suspicious solid or complex cystic mass is detected in the left breast. No morphologically abnormal axillary lymph nodes are detected.   3/5/24 US guided biopsy x 3: 1. Right breast 10 o'clock axis, 4-6 cm from the nipple, core biopsy: -Poorly differentiated invasive ductal carcinoma, measuring 0.9 cm in maximal length in this material  ER 0%, AK 0%, HER2 1+, Ki-67 60% -Ductal carcinoma in situ is also present, intermediate to high nuclear grade, solid pattern with extension into lobules. 2. Right breast 9 o'clock axis, 4 cm from the nipple, core biopsy: -Poorly differentiated invasive ductal carcinoma morphologically similar to specimen 1, measuring 0.5 cm in maximal length in this material. ER 0%, AK 0%, HER2 0, Ki-67 44% -Ductal carcinoma in situ is also present, intermediate to high nuclear grade, solid pattern, with extension into lobules. 3. Right axilla, core biopsy: Metastatic carcinoma involving lymph node tissue and morphologically similar to specimen 1 and 2  3/26/24: MRI B/L Breast: Biopsy-proven right breast malignancy measuring at least 5.5 cm. Possible satellite upper central right breast, 2 closed the chest wall to biopsy. Recommend surgical/oncologic managment. Metastatic lymph node in the right axilla. No evidence of left breast malignancy.  [de-identified] : poorly differentiated invasive ductal carcinoma [de-identified] : ER-, SD-, HER2 0-1+, Ki-67 44-60% [de-identified] : Here to continue treatment with weekly carbo/taxol and pembro Q6wks.  Reports a dry cough associated with nasal congestion/sneezing, especially when outside, but also a cough which patient thinks is worse on lying flat.   Denies fever/chills, sob/dobbs, sore throat, chest pain, diarrhea, vomiting, neuropathy, rash.

## 2024-05-29 NOTE — ASSESSMENT
[FreeTextEntry1] : 53 year old perimenopausal woman (LMP 10/2023) with newly diagnosed multifocal triple negative right breast cancer is referred by Dr. Will Trejo to discuss neoadjuvant systemic therapy.  cT3 cN1 Stage IIIC poorly diff IDC ER 0% [place] 0% HER2 renetta 1+ Ki 67 60%   Extensive discussion with patient, her  and son regarding the type of cancer, triple negative, the clinical stage necessitating neoadjuvant chemotherapy together with immunotherapy as per Keynote 522 clinical trial - ddAC with Neulasta [id number] x4 cycles followed by weekly Carboplatin/Taxol x12 concurrently with Pembrolizumab Q6wks during this treatment and followed after surgery as well every 3 wks x9 cycles. Mentioned in case of lack of pCR, capecitabine to be recommended. Also will need to see rad onc post op.   Reviewed potential side effects of chemotherapy and immunotherapy providing patient with patient information on each of the drugs above. Reviewed that chemotherapy can cause fatigue, hair loss, allergic/infusion reactions, skin changes, poor appetite, raise risk of infection by causing bone marrow suppression with subsequent low blood counts, potentially needing blood transfusions, also growth factor support to shorten duration of neutropenia, also nausea/vomiting, loose stools, musculoskeletal pain, peripheral neuropathy, infertility and teratogenic effects, as well as small risk of cardiomyopathy/heart failure and small risk of secondary malignancies. Also reviewed side effects that can arise from immunotherapy including but not limited to inflammatory conditions involving different organs, leading to hypothyroidism in case the thyroid is affected by treatment, colitis in case the colon is involved, pericarditis, pneumonitis etc.   Visit 4/3/24: C1 ddAC and pembro ECHO 3/27/24 reviewed: EF 60-65% PET 3/27/24: showed enhancing lesion in R breast c/w her newly diagnosed breast cancer in addition to R axillary LAD. Focal hypermetabolic activity also noted in L colon which warrants GI evaluation at a later date. Patient reminded of need for colonoscopy. (she never had one) Olanzapine, prochlorperazine and lidocaine cream prescriptions sent to her pharmacy. Dietician will see her chairside to address her nutrition questions. She has been cleared for treatment using labs from 3/18/24  Visit 4/17/24: Patient with response to treatment after the first cycle of AC with Neulasta.  Tolerating treatment well with fatigue but otherwise no problems.  To proceed with C2 ddAC with Neulasta Onpro. Olanzapine starting tonight, instructed to take once nightly x4 days total.  Compazine prn nausea, olanzapine 1tab/day for four days starting day of chemo. Instructed to call right away or go to ER in case of fever/temp of 100.4F or higher.   Visit 5/1/24: Labs reviewed and patient cleared for C3 ddAC with Neulasta Onpro. Reports minimal nausea and nail discoloration, otherwise no other reported AEs. RTC 5/15 for follow up and C4  5/15/24 Labs reviewed and patient cleared for C4 ddAC with Neulasta Onpro. She is so far tolerating the treatment well.  Reviewed side effects of weekly Paclitaxel, Carboplatin, and additional questions regarding the next phase of the treatment were answered. Pembrolizumab to continue [id number]. Reassurance provided regarding concerns about dietary restrictions. Consent obtained.  5/29/24 To proceed with weekly carbo/taxol today.  Labs within parameter. Pembro to continue Q6wks as well.  To evaluate dry cough, worse when lying flat and associated with nasal discharge, with CXR.  To repeat Echo as well given h/o recent adriamycin use.  To use Claritin prn nasal discharge and dry cough.  Re-reviewed risks associated with Taxol/Carbo, a new chemo agents for patient today; she expressed verbal understanding and agreement to proceed with treatment. Consent previously signed.   RTC for f/u and treatment in 2 wks, treatments to continue weekly as per order. Reviewed that I will be leaving the practice end of June and that patient can be followed by Ashly Rios or Alexandre going forward after I leave the practice.

## 2024-05-29 NOTE — PHARMACY COMMUNICATION NOTE - COMMENTS
Per Dr. Davila, she is using a SCr of 1mg/dL to calculate the carboplatin dose for patient regardless of the fact that the patient's SCr today is 0.6mg/dL. She is aware the patient could tolerate a higher dose but would like to continue with 131mg.

## 2024-05-29 NOTE — PHYSICAL EXAM
[Restricted in physically strenuous activity but ambulatory and able to carry out work of a light or sedentary nature] : Status 1- Restricted in physically strenuous activity but ambulatory and able to carry out work of a light or sedentary nature, e.g., light house work, office work [Normal] : affect appropriate [de-identified] : down from 5cm to about 1-2cm, mobile hard mass

## 2024-05-29 NOTE — CHART NOTE - NSCHARTNOTEFT_GEN_A_CORE
Pt here for her first dose of taxol/ carbo. Taxol started and after 2 min, she reported nausea and warmth over her face. The medication was stopped and she felt back to baseline within a few minutes. Denies any chest pain, dyspnea, dizziness, skin changes at this time. Her premedications included 25mg of IV benadryl and 10mg of IV Dex. Given an additional 25mg of IV benadryl and will resume taxol with very close monitoring. Discussed with Dr. Davila. Pt here for her first dose of taxol/ carbo. Taxol started and after 2 min, she reported nausea and warmth over her face. Also with mild back pain. The medication was stopped and she felt back to baseline within a few minutes. Denies any chest pain, dyspnea, dizziness, skin changes at this time. Her premedications included 25mg of IV benadryl and 10mg of IV Dex. Given an additional 25mg of IV benadryl and will resume taxol with very close monitoring. Discussed with Dr. Davila.

## 2024-06-03 LAB — TSH SERPL-ACNC: 0.78 UIU/ML

## 2024-06-05 RX ORDER — CARBOPLATIN 50 MG
131 VIAL (EA) INTRAVENOUS ONCE
Refills: 0 | Status: COMPLETED | OUTPATIENT
Start: 2024-06-06 | End: 2024-06-06

## 2024-06-05 RX ORDER — PACLITAXEL 6 MG/ML
132 INJECTION, SOLUTION, CONCENTRATE INTRAVENOUS ONCE
Refills: 0 | Status: COMPLETED | OUTPATIENT
Start: 2024-06-06 | End: 2024-06-06

## 2024-06-05 RX ORDER — DIPHENHYDRAMINE HCL 50 MG
25 CAPSULE ORAL ONCE
Refills: 0 | Status: COMPLETED | OUTPATIENT
Start: 2024-06-06 | End: 2024-06-06

## 2024-06-06 ENCOUNTER — NON-APPOINTMENT (OUTPATIENT)
Age: 54
End: 2024-06-06

## 2024-06-06 ENCOUNTER — APPOINTMENT (OUTPATIENT)
Dept: INFUSION THERAPY | Facility: CLINIC | Age: 54
End: 2024-06-06

## 2024-06-06 ENCOUNTER — OUTPATIENT (OUTPATIENT)
Dept: OUTPATIENT SERVICES | Facility: HOSPITAL | Age: 54
LOS: 1 days | End: 2024-06-06
Payer: COMMERCIAL

## 2024-06-06 VITALS
HEIGHT: 64 IN | TEMPERATURE: 97 F | DIASTOLIC BLOOD PRESSURE: 74 MMHG | WEIGHT: 134.04 LBS | HEART RATE: 78 BPM | RESPIRATION RATE: 18 BRPM | SYSTOLIC BLOOD PRESSURE: 114 MMHG | OXYGEN SATURATION: 99 %

## 2024-06-06 DIAGNOSIS — C50.919 MALIGNANT NEOPLASM OF UNSPECIFIED SITE OF UNSPECIFIED FEMALE BREAST: ICD-10-CM

## 2024-06-06 LAB
ALBUMIN SERPL ELPH-MCNC: 4 G/DL — SIGNIFICANT CHANGE UP (ref 3.3–5)
ALP SERPL-CCNC: 68 U/L — SIGNIFICANT CHANGE UP (ref 40–120)
ALT FLD-CCNC: 19 U/L — SIGNIFICANT CHANGE UP (ref 10–45)
ANION GAP SERPL CALC-SCNC: 10 MMOL/L — SIGNIFICANT CHANGE UP (ref 5–17)
AST SERPL-CCNC: 26 U/L — SIGNIFICANT CHANGE UP (ref 10–40)
BILIRUB SERPL-MCNC: 0.5 MG/DL — SIGNIFICANT CHANGE UP (ref 0.2–1.2)
BUN SERPL-MCNC: 9 MG/DL — SIGNIFICANT CHANGE UP (ref 7–23)
CALCIUM SERPL-MCNC: 9.7 MG/DL — SIGNIFICANT CHANGE UP (ref 8.4–10.5)
CHLORIDE SERPL-SCNC: 106 MMOL/L — SIGNIFICANT CHANGE UP (ref 96–108)
CO2 SERPL-SCNC: 26 MMOL/L — SIGNIFICANT CHANGE UP (ref 22–31)
CREAT SERPL-MCNC: 0.8 MG/DL — SIGNIFICANT CHANGE UP (ref 0.5–1.3)
EGFR: 88 ML/MIN/1.73M2 — SIGNIFICANT CHANGE UP
GLUCOSE SERPL-MCNC: 107 MG/DL — HIGH (ref 70–99)
HCT VFR BLD CALC: 26.6 % — LOW (ref 34.5–45)
HGB BLD-MCNC: 8.5 G/DL — LOW (ref 11.5–15.5)
ISTAT TOTAL BETA HCG, PLASMA: <5 IU/L — SIGNIFICANT CHANGE UP
LYMPHOCYTES # BLD AUTO: 0.5 K/UL — LOW (ref 1–3.3)
LYMPHOCYTES # BLD AUTO: 7.6 % — LOW (ref 13–44)
MCHC RBC-ENTMCNC: 29.9 PG — SIGNIFICANT CHANGE UP (ref 27–34)
MCHC RBC-ENTMCNC: 32 GM/DL — SIGNIFICANT CHANGE UP (ref 32–36)
MCV RBC AUTO: 93.7 FL — SIGNIFICANT CHANGE UP (ref 80–100)
NEUTROPHILS # BLD AUTO: 5.4 K/UL — SIGNIFICANT CHANGE UP (ref 1.8–7.4)
NEUTROPHILS NFR BLD AUTO: 78.7 % — HIGH (ref 43–77)
PLATELET # BLD AUTO: 318 K/UL — SIGNIFICANT CHANGE UP (ref 150–400)
POTASSIUM SERPL-MCNC: 4.1 MMOL/L — SIGNIFICANT CHANGE UP (ref 3.5–5.3)
POTASSIUM SERPL-SCNC: 4.1 MMOL/L — SIGNIFICANT CHANGE UP (ref 3.5–5.3)
PROT SERPL-MCNC: 7.3 G/DL — SIGNIFICANT CHANGE UP (ref 6–8.3)
RBC # BLD: 2.84 M/UL — LOW (ref 3.8–5.2)
RBC # FLD: 16.2 % — HIGH (ref 10.3–14.5)
SODIUM SERPL-SCNC: 142 MMOL/L — SIGNIFICANT CHANGE UP (ref 135–145)
TSH SERPL-MCNC: 0.77 UIU/ML — SIGNIFICANT CHANGE UP (ref 0.27–4.2)
WBC # BLD: 6.8 K/UL — SIGNIFICANT CHANGE UP (ref 3.8–10.5)
WBC # FLD AUTO: 6.8 K/UL — SIGNIFICANT CHANGE UP (ref 3.8–10.5)

## 2024-06-06 PROCEDURE — 36415 COLL VENOUS BLD VENIPUNCTURE: CPT

## 2024-06-06 PROCEDURE — 85025 COMPLETE CBC W/AUTO DIFF WBC: CPT

## 2024-06-06 PROCEDURE — 96413 CHEMO IV INFUSION 1 HR: CPT

## 2024-06-06 PROCEDURE — 84443 ASSAY THYROID STIM HORMONE: CPT

## 2024-06-06 PROCEDURE — 96417 CHEMO IV INFUS EACH ADDL SEQ: CPT

## 2024-06-06 PROCEDURE — 80053 COMPREHEN METABOLIC PANEL: CPT

## 2024-06-06 PROCEDURE — 96375 TX/PRO/DX INJ NEW DRUG ADDON: CPT

## 2024-06-06 PROCEDURE — 84702 CHORIONIC GONADOTROPIN TEST: CPT

## 2024-06-06 RX ORDER — DEXAMETHASONE 0.5 MG/5ML
10 ELIXIR ORAL ONCE
Refills: 0 | Status: DISCONTINUED | OUTPATIENT
Start: 2024-06-06 | End: 2024-06-06

## 2024-06-06 RX ORDER — DEXAMETHASONE 0.5 MG/5ML
10 ELIXIR ORAL ONCE
Refills: 0 | Status: COMPLETED | OUTPATIENT
Start: 2024-06-06 | End: 2024-06-06

## 2024-06-06 RX ORDER — DEXAMETHASONE 0.5 MG/5ML
4 ELIXIR ORAL ONCE
Refills: 0 | Status: DISCONTINUED | OUTPATIENT
Start: 2024-06-06 | End: 2024-06-06

## 2024-06-06 RX ORDER — FAMOTIDINE 10 MG/ML
20 INJECTION INTRAVENOUS ONCE
Refills: 0 | Status: COMPLETED | OUTPATIENT
Start: 2024-06-06 | End: 2024-06-06

## 2024-06-06 RX ORDER — LIDOCAINE 4 G/100G
1 CREAM TOPICAL ONCE
Refills: 0 | Status: COMPLETED | OUTPATIENT
Start: 2024-06-06 | End: 2024-06-06

## 2024-06-06 RX ORDER — PALONOSETRON HYDROCHLORIDE 0.25 MG/5ML
0.25 INJECTION, SOLUTION INTRAVENOUS ONCE
Refills: 0 | Status: COMPLETED | OUTPATIENT
Start: 2024-06-06 | End: 2024-06-06

## 2024-06-06 RX ORDER — SODIUM CHLORIDE 9 MG/ML
10 INJECTION INTRAMUSCULAR; INTRAVENOUS; SUBCUTANEOUS ONCE
Refills: 0 | Status: COMPLETED | OUTPATIENT
Start: 2024-06-06 | End: 2024-06-06

## 2024-06-06 RX ADMIN — Medication 131 MILLIGRAM(S): at 18:00

## 2024-06-06 RX ADMIN — Medication 25 MILLIGRAM(S): at 16:20

## 2024-06-06 RX ADMIN — SODIUM CHLORIDE 10 MILLILITER(S): 9 INJECTION INTRAMUSCULAR; INTRAVENOUS; SUBCUTANEOUS at 18:20

## 2024-06-06 RX ADMIN — Medication 202 MILLIGRAM(S): at 15:49

## 2024-06-06 RX ADMIN — Medication 131 MILLIGRAM(S): at 17:30

## 2024-06-06 RX ADMIN — PACLITAXEL 132 MILLIGRAM(S): 6 INJECTION, SOLUTION, CONCENTRATE INTRAVENOUS at 16:05

## 2024-06-06 RX ADMIN — FAMOTIDINE 20 MILLIGRAM(S): 10 INJECTION INTRAVENOUS at 15:50

## 2024-06-06 RX ADMIN — Medication 10 MILLIGRAM(S): at 15:38

## 2024-06-06 RX ADMIN — PALONOSETRON HYDROCHLORIDE 0.25 MILLIGRAM(S): 0.25 INJECTION, SOLUTION INTRAVENOUS at 15:48

## 2024-06-06 RX ADMIN — Medication 204 MILLIGRAM(S): at 15:23

## 2024-06-06 RX ADMIN — PACLITAXEL 132 MILLIGRAM(S): 6 INJECTION, SOLUTION, CONCENTRATE INTRAVENOUS at 17:20

## 2024-06-06 NOTE — CHART NOTE - NSCHARTNOTEFT_GEN_A_CORE
Patient presents to [facility] for C2 Taxol/Carbo. She reports feeling well today except does endorse a slightly painful bump to her RLE x 3d (tib/fib). She denies any trauma or injury that she can recall.     On exam, 2-3cm area RLE (shin) with lump that is mildly tender to touch. It is NOT erythematous or fluctuant, not suspicious for infection. Overlying the tib/ fib (shin) so not concerning for DVT at this time. Perhaps some bruising. Advised patient to ice the area, observe and notify her team for any changes.     Discussed with oncology team. Patient presents to [facility] for C2 Taxol/Carbo. She reports feeling well today except does endorse a slightly painful bump to her RLE x 3d (tib/fib). She denies any trauma or injury that she can recall.     On exam, 2-3cm area RLE (shin) with lump that is mildly tender to touch. Neurovascularly intact without deformity. It is NOT erythematous or fluctuant, not suspicious for infection. Overlying the tib/ fib (shin) so not concerning for DVT at this time. Perhaps some bruising. Advised patient to ice the area, observe and notify her team for any changes.     Discussed with oncology team.

## 2024-06-07 NOTE — PHARMACY COMMUNICATION NOTE - COMMENTS
Pt had a reaction during the 1st paclitaxol treatment (05/29/2024). Keep 10mg dexamethasone premedication for the 2nd paclitaxol on 6/6/2024.

## 2024-06-11 ENCOUNTER — RESULT REVIEW (OUTPATIENT)
Age: 54
End: 2024-06-11

## 2024-06-11 ENCOUNTER — OUTPATIENT (OUTPATIENT)
Dept: OUTPATIENT SERVICES | Facility: HOSPITAL | Age: 54
LOS: 1 days | End: 2024-06-11
Payer: COMMERCIAL

## 2024-06-11 DIAGNOSIS — C50.919 MALIGNANT NEOPLASM OF UNSPECIFIED SITE OF UNSPECIFIED FEMALE BREAST: ICD-10-CM

## 2024-06-11 PROCEDURE — 93306 TTE W/DOPPLER COMPLETE: CPT

## 2024-06-11 PROCEDURE — 93356 MYOCRD STRAIN IMG SPCKL TRCK: CPT

## 2024-06-11 PROCEDURE — 71046 X-RAY EXAM CHEST 2 VIEWS: CPT | Mod: 26

## 2024-06-11 PROCEDURE — 76376 3D RENDER W/INTRP POSTPROCES: CPT | Mod: 26

## 2024-06-11 PROCEDURE — 93306 TTE W/DOPPLER COMPLETE: CPT | Mod: 26

## 2024-06-11 PROCEDURE — 71046 X-RAY EXAM CHEST 2 VIEWS: CPT

## 2024-06-11 NOTE — PHARMACY COMMUNICATION NOTE - COMMENTS
Email correspondence with Dr. Davila:   "I just wanted to confirm if we will be continuing to use a SCr of 1mg/dL to calculate patient Larisa Workman's (: 1970) weekly carboplatin dose regardless if the patient's SCr is <1mg/dL? Patient's most recent SCr was 0.8mg/dL on 24 so technically patient could receive a dose of 155mg which is ~16% difference from ordered dose of 131mg. Please let us know if this is the plan for future cycles."    Dr. Davila responded, "Yes Id like to keep Cr the same at 1.0 thank you." ?

## 2024-06-12 ENCOUNTER — APPOINTMENT (OUTPATIENT)
Dept: INFUSION THERAPY | Facility: CLINIC | Age: 54
End: 2024-06-12

## 2024-06-12 ENCOUNTER — OUTPATIENT (OUTPATIENT)
Dept: OUTPATIENT SERVICES | Facility: HOSPITAL | Age: 54
LOS: 1 days | End: 2024-06-12
Payer: COMMERCIAL

## 2024-06-12 ENCOUNTER — APPOINTMENT (OUTPATIENT)
Dept: HEMATOLOGY ONCOLOGY | Facility: CLINIC | Age: 54
End: 2024-06-12
Payer: COMMERCIAL

## 2024-06-12 VITALS
TEMPERATURE: 99.5 F | HEIGHT: 64 IN | WEIGHT: 127 LBS | OXYGEN SATURATION: 100 % | SYSTOLIC BLOOD PRESSURE: 112 MMHG | HEART RATE: 106 BPM | BODY MASS INDEX: 21.68 KG/M2 | RESPIRATION RATE: 18 BRPM | DIASTOLIC BLOOD PRESSURE: 76 MMHG

## 2024-06-12 DIAGNOSIS — C50.919 MALIGNANT NEOPLASM OF UNSPECIFIED SITE OF UNSPECIFIED FEMALE BREAST: ICD-10-CM

## 2024-06-12 LAB
ALBUMIN SERPL ELPH-MCNC: 3.6 G/DL
ALP BLD-CCNC: 62 U/L
ALT SERPL-CCNC: 31 U/L
ANION GAP SERPL CALC-SCNC: 11 MMOL/L
AST SERPL-CCNC: 29 U/L
BILIRUB SERPL-MCNC: 0.6 MG/DL
BUN SERPL-MCNC: 13 MG/DL
CALCIUM SERPL-MCNC: 9.7 MG/DL
CHLORIDE SERPL-SCNC: 108 MMOL/L
CO2 SERPL-SCNC: 27 MMOL/L
CREAT SERPL-MCNC: 0.7 MG/DL
EGFR: 103 ML/MIN/1.73M2
GLUCOSE SERPL-MCNC: 124 MG/DL
HCT VFR BLD CALC: 27.6 %
HGB BLD-MCNC: 8.6 G/DL
LYMPHOCYTES # BLD AUTO: 0.4 K/UL
LYMPHOCYTES NFR BLD AUTO: 9.2 %
MAN DIFF?: NO
MCHC RBC-ENTMCNC: 29.6 PG
MCHC RBC-ENTMCNC: 31.2 GM/DL
MCV RBC AUTO: 94.8 FL
NEUTROPHILS # BLD AUTO: 3.3 K/UL
NEUTROPHILS NFR BLD AUTO: 76.1 %
PLATELET # BLD AUTO: 253 K/UL
POTASSIUM SERPL-SCNC: 4.2 MMOL/L
PROT SERPL-MCNC: 7.2 G/DL
RBC # BLD: 2.91 M/UL
RBC # FLD: 16.4 %
SODIUM SERPL-SCNC: 146 MMOL/L
WBC # FLD AUTO: 4.3 K/UL

## 2024-06-12 PROCEDURE — 96375 TX/PRO/DX INJ NEW DRUG ADDON: CPT

## 2024-06-12 PROCEDURE — 96413 CHEMO IV INFUSION 1 HR: CPT

## 2024-06-12 PROCEDURE — 96417 CHEMO IV INFUS EACH ADDL SEQ: CPT

## 2024-06-12 PROCEDURE — 99214 OFFICE O/P EST MOD 30 MIN: CPT

## 2024-06-12 RX ORDER — LIDOCAINE 4 G/100G
1 CREAM TOPICAL ONCE
Refills: 0 | Status: COMPLETED | OUTPATIENT
Start: 2024-06-12 | End: 2024-06-12

## 2024-06-12 RX ORDER — PALONOSETRON HYDROCHLORIDE 0.25 MG/5ML
0.25 INJECTION, SOLUTION INTRAVENOUS ONCE
Refills: 0 | Status: COMPLETED | OUTPATIENT
Start: 2024-06-12 | End: 2024-06-12

## 2024-06-12 RX ORDER — SODIUM CHLORIDE 9 MG/ML
10 INJECTION INTRAMUSCULAR; INTRAVENOUS; SUBCUTANEOUS ONCE
Refills: 0 | Status: COMPLETED | OUTPATIENT
Start: 2024-06-12 | End: 2024-06-12

## 2024-06-12 RX ORDER — DIPHENHYDRAMINE HCL 50 MG
25 CAPSULE ORAL ONCE
Refills: 0 | Status: COMPLETED | OUTPATIENT
Start: 2024-06-12 | End: 2024-06-12

## 2024-06-12 RX ORDER — DEXAMETHASONE 0.5 MG/5ML
10 ELIXIR ORAL ONCE
Refills: 0 | Status: COMPLETED | OUTPATIENT
Start: 2024-06-12 | End: 2024-06-12

## 2024-06-12 RX ORDER — CARBOPLATIN 50 MG
131 VIAL (EA) INTRAVENOUS ONCE
Refills: 0 | Status: COMPLETED | OUTPATIENT
Start: 2024-06-12 | End: 2024-06-12

## 2024-06-12 RX ORDER — PACLITAXEL 6 MG/ML
132 INJECTION, SOLUTION, CONCENTRATE INTRAVENOUS ONCE
Refills: 0 | Status: COMPLETED | OUTPATIENT
Start: 2024-06-12 | End: 2024-06-12

## 2024-06-12 RX ORDER — FAMOTIDINE 10 MG/ML
20 INJECTION INTRAVENOUS ONCE
Refills: 0 | Status: COMPLETED | OUTPATIENT
Start: 2024-06-12 | End: 2024-06-12

## 2024-06-12 RX ADMIN — PACLITAXEL 132 MILLIGRAM(S): 6 INJECTION, SOLUTION, CONCENTRATE INTRAVENOUS at 18:10

## 2024-06-12 RX ADMIN — Medication 25 MILLIGRAM(S): at 16:50

## 2024-06-12 RX ADMIN — Medication 131 MILLIGRAM(S): at 18:45

## 2024-06-12 RX ADMIN — Medication 204 MILLIGRAM(S): at 16:20

## 2024-06-12 RX ADMIN — PALONOSETRON HYDROCHLORIDE 0.25 MILLIGRAM(S): 0.25 INJECTION, SOLUTION INTRAVENOUS at 17:05

## 2024-06-12 RX ADMIN — FAMOTIDINE 20 MILLIGRAM(S): 10 INJECTION INTRAVENOUS at 17:00

## 2024-06-12 RX ADMIN — PACLITAXEL 132 MILLIGRAM(S): 6 INJECTION, SOLUTION, CONCENTRATE INTRAVENOUS at 17:10

## 2024-06-12 RX ADMIN — SODIUM CHLORIDE 10 MILLILITER(S): 9 INJECTION INTRAMUSCULAR; INTRAVENOUS; SUBCUTANEOUS at 18:50

## 2024-06-12 RX ADMIN — Medication 202 MILLIGRAM(S): at 16:35

## 2024-06-12 RX ADMIN — Medication 10 MILLIGRAM(S): at 16:35

## 2024-06-12 RX ADMIN — Medication 131 MILLIGRAM(S): at 18:10

## 2024-06-13 NOTE — PHYSICAL EXAM
[Restricted in physically strenuous activity but ambulatory and able to carry out work of a light or sedentary nature] : Status 1- Restricted in physically strenuous activity but ambulatory and able to carry out work of a light or sedentary nature, e.g., light house work, office work [Normal] : affect appropriate [de-identified] : down from 5cm to about 1-2cm, mobile hard mass

## 2024-06-13 NOTE — HISTORY OF PRESENT ILLNESS
[Disease: _____________________] : Disease: [unfilled] [de-identified] : 53 year old female with newly diagnosed multifocal triple negative right breast cancer is referred by Dr. Will Trejo to discuss neoadjuvant systemic therapy. Here today for f/u and tx.   Patient palpated a lump in right breast.  2/21/24 R breast US: New 3 mm hypoechoic mass right 6:00 3 cm from the nipple. New 1 cm hypoechoic mass with indistinct margins right 9:00 4 cm from the nipple . 1.5 x 0.9 x 1.7 cm irregular hypoechoic mass right 10:00 4 cm from the nipple underlying region of palpable mass.  New 1.9 x 0.4 cm irregular hypoechoic mass right 10:00 5 cm from the nipple underlying region of palpable mass. New 1.6 x 1.2 x 1.8 cm irregular hypoechoic mass with cystic component right 10:00 6 cm from the nipple underlying region of palpable mass. Lymph nodes with thickened cortex right axillary region.  3/5/24 b/l dx MG BREAST COMPOSITION:  The breasts are extremely dense, which lowers the sensitivity of mammography.  Spot compression views of the upper outer right breast do not demonstrate any distinct mammographic abnormalities, probably due to the dense nature of the patient's breast. No suspicious masses, architectural distortion, or significant calcifications are detected in the left breast. b/l US: - 6 o'clock axis, 3 cm from the nipple, 0.3 x 0.3 x 0.4 cm ovoid circumscribed hypoechoic mass -9 o'clock axis, 4 cm from the nipple, 1.0 x 0.6 x 0.5 cm ovoid parallel hypoechoic mass with irregular margins. Ultrasound-guided biopsy is recommended. -10 o'clock axis, 4 cm from the nipple, 1.6 x 0.9 x 1.7 cm ovoid parallel hypoechoic mass with irregular margins,  -10 o'clock axis, 4 cm from the nipple, 1.4 x 0.8 x 1.5 cm ovoid parallel hypoechoic mass with irregular margins -10 o'clock axis, 5 cm from the nipple, 2.5 x 0.7 x 1.4 cm irregular hypoechoic mass -10 o'clock axis, 6 cm from the nipple, 2.6 x 1.3 x 1.9 cm irregular hypoechoic mass. -The irregular hypoechoic masses at the 10 o'clock axis, spanning the 4 to 6 cm from the nipple region, appear to be contiguous with one another and can be biopsied simultaneously. -Abnormally thickened right axillary lymph nodes, with the most prominent measuring 1.9 x 0.9 x 1.7 cm. No suspicious solid or complex cystic mass is detected in the left breast. No morphologically abnormal axillary lymph nodes are detected.   3/5/24 US guided biopsy x 3: 1. Right breast 10 o'clock axis, 4-6 cm from the nipple, core biopsy: -Poorly differentiated invasive ductal carcinoma, measuring 0.9 cm in maximal length in this material  ER 0%, [place] 0%, HER2 1+, Ki-67 60% -Ductal carcinoma in situ is also present, intermediate to high nuclear grade, solid pattern with extension into lobules. 2. Right breast 9 o'clock axis, 4 cm from the nipple, core biopsy: -Poorly differentiated invasive ductal carcinoma morphologically similar to specimen 1, measuring 0.5 cm in maximal length in this material. ER 0%, MA 0%, HER2 0, Ki-67 44% -Ductal carcinoma in situ is also present, intermediate to high nuclear grade, solid pattern, with extension into lobules. 3. Right axilla, core biopsy: Metastatic carcinoma involving lymph node tissue and morphologically similar to specimen 1 and 2  3/26/24: MRI B/L Breast: Biopsy-proven right breast malignancy measuring at least 5.5 cm. Possible satellite upper central right breast, 2 closed the chest wall to biopsy. Recommend surgical/oncologic managment. Metastatic lymph node in the right axilla. No evidence of left breast malignancy.  [de-identified] : poorly differentiated invasive ductal carcinoma [de-identified] : ER-, [place]-, HER2 0-1+, Ki-67 44-60% [de-identified] : Here to continue treatment with weekly carbo/taxol and pembro Q6wks.  Reports a dry cough associated with nasal congestion/sneezing, especially when outside, but also a cough which patient thinks is worse on lying flat.   Denies fever/chills, sob/dobbs, sore throat, chest pain, diarrhea, vomiting, neuropathy, rash.

## 2024-06-13 NOTE — ASSESSMENT
[FreeTextEntry1] : 53 year old perimenopausal woman (LMP 10/2023) with newly diagnosed multifocal triple negative right breast cancer is referred by Dr. Will Trejo to discuss neoadjuvant systemic therapy.  cT3 cN1 Stage IIIC poorly diff IDC ER 0% [place] 0% HER2 renetta 1+ Ki 67 60%   Extensive discussion with patient, her  and son regarding the type of cancer, triple negative, the clinical stage necessitating neoadjuvant chemotherapy together with immunotherapy as per Keynote 522 clinical trial - ddAC with Neulasta [id number] x4 cycles followed by weekly Carboplatin/Taxol x12 concurrently with Pembrolizumab Q6wks during this treatment and followed after surgery as well every 3 wks x9 cycles. Mentioned in case of lack of pCR, capecitabine to be recommended. Also will need to see rad onc post op.   Reviewed potential side effects of chemotherapy and immunotherapy providing patient with patient information on each of the drugs above. Reviewed that chemotherapy can cause fatigue, hair loss, allergic/infusion reactions, skin changes, poor appetite, raise risk of infection by causing bone marrow suppression with subsequent low blood counts, potentially needing blood transfusions, also growth factor support to shorten duration of neutropenia, also nausea/vomiting, loose stools, musculoskeletal pain, peripheral neuropathy, infertility and teratogenic effects, as well as small risk of cardiomyopathy/heart failure and small risk of secondary malignancies. Also reviewed side effects that can arise from immunotherapy including but not limited to inflammatory conditions involving different organs, leading to hypothyroidism in case the thyroid is affected by treatment, colitis in case the colon is involved, pericarditis, pneumonitis etc.   Visit 4/3/24: C1 ddAC and pembro ECHO 3/27/24 reviewed: EF 60-65% PET 3/27/24: showed enhancing lesion in R breast c/w her newly diagnosed breast cancer in addition to R axillary LAD. Focal hypermetabolic activity also noted in L colon which warrants GI evaluation at a later date. Patient reminded of need for colonoscopy. (she never had one) Olanzapine, prochlorperazine and lidocaine cream prescriptions sent to her pharmacy. Dietician will see her chairside to address her nutrition questions. She has been cleared for treatment using labs from 3/18/24  Visit 4/17/24: Patient with response to treatment after the first cycle of AC with Neulasta.  Tolerating treatment well with fatigue but otherwise no problems.  To proceed with C2 ddAC with Neulasta Onpro. Olanzapine starting tonight, instructed to take once nightly x4 days total.  Compazine prn nausea, olanzapine 1tab/day for four days starting day of chemo. Instructed to call right away or go to ER in case of fever/temp of 100.4F or higher.   Visit 5/1/24: Labs reviewed and patient cleared for C3 ddAC with Neulasta Onpro. Reports minimal nausea and nail discoloration, otherwise no other reported AEs. RTC 5/15 for follow up and C4  5/15/24 Labs reviewed and patient cleared for C4 ddAC with Neulasta Onpro. She is so far tolerating the treatment well.  Reviewed side effects of weekly Paclitaxel, Carboplatin, and additional questions regarding the next phase of the treatment were answered. Pembrolizumab to continue [id number]. Reassurance provided regarding concerns about dietary restrictions. Consent obtained.  6/12/24 To proceed with 3rd weekly carbo/taxol today.  Labs within parameter. Pembro to continue Q6wks as well.   6/2024  Echo as well given h/o recent adriamycin use normal EF.   To use Claritin prn nasal discharge and dry cough.  Re-reviewed risks associated with Taxol/Carbo, a new chemo agents for patient today; she expressed verbal understanding and agreement to proceed with treatment. Consent previously signed.   RTC for f/u and treatment in 2 wks, treatments to continue weekly as per order. Reviewed that I will be leaving the practice end of June and that patient can be followed by Ashly Rios or Alexandre going forward after I leave the practice.

## 2024-06-14 ENCOUNTER — NON-APPOINTMENT (OUTPATIENT)
Age: 54
End: 2024-06-14

## 2024-06-14 DIAGNOSIS — R05.9 COUGH, UNSPECIFIED: ICD-10-CM

## 2024-06-19 ENCOUNTER — OUTPATIENT (OUTPATIENT)
Dept: OUTPATIENT SERVICES | Facility: HOSPITAL | Age: 54
LOS: 1 days | End: 2024-06-19
Payer: COMMERCIAL

## 2024-06-19 ENCOUNTER — APPOINTMENT (OUTPATIENT)
Dept: INFUSION THERAPY | Facility: CLINIC | Age: 54
End: 2024-06-19

## 2024-06-19 ENCOUNTER — NON-APPOINTMENT (OUTPATIENT)
Age: 54
End: 2024-06-19

## 2024-06-19 VITALS
SYSTOLIC BLOOD PRESSURE: 116 MMHG | OXYGEN SATURATION: 97 % | TEMPERATURE: 98 F | HEART RATE: 80 BPM | DIASTOLIC BLOOD PRESSURE: 76 MMHG | RESPIRATION RATE: 18 BRPM

## 2024-06-19 VITALS
RESPIRATION RATE: 18 BRPM | OXYGEN SATURATION: 98 % | DIASTOLIC BLOOD PRESSURE: 72 MMHG | WEIGHT: 128.09 LBS | TEMPERATURE: 99 F | HEIGHT: 64 IN | HEART RATE: 96 BPM | SYSTOLIC BLOOD PRESSURE: 111 MMHG

## 2024-06-19 DIAGNOSIS — C50.919 MALIGNANT NEOPLASM OF UNSPECIFIED SITE OF UNSPECIFIED FEMALE BREAST: ICD-10-CM

## 2024-06-19 LAB
ALBUMIN SERPL ELPH-MCNC: 3.5 G/DL — SIGNIFICANT CHANGE UP (ref 3.3–5)
ALP SERPL-CCNC: 57 U/L — SIGNIFICANT CHANGE UP (ref 40–120)
ALT FLD-CCNC: 31 U/L — SIGNIFICANT CHANGE UP (ref 10–45)
ANION GAP SERPL CALC-SCNC: 9 MMOL/L — SIGNIFICANT CHANGE UP (ref 5–17)
AST SERPL-CCNC: 33 U/L — SIGNIFICANT CHANGE UP (ref 10–40)
BILIRUB SERPL-MCNC: 0.5 MG/DL — SIGNIFICANT CHANGE UP (ref 0.2–1.2)
BUN SERPL-MCNC: 12 MG/DL — SIGNIFICANT CHANGE UP (ref 7–23)
CALCIUM SERPL-MCNC: 9.7 MG/DL — SIGNIFICANT CHANGE UP (ref 8.4–10.5)
CHLORIDE SERPL-SCNC: 109 MMOL/L — HIGH (ref 96–108)
CO2 SERPL-SCNC: 26 MMOL/L — SIGNIFICANT CHANGE UP (ref 22–31)
CREAT SERPL-MCNC: 0.7 MG/DL — SIGNIFICANT CHANGE UP (ref 0.5–1.3)
EGFR: 103 ML/MIN/1.73M2 — SIGNIFICANT CHANGE UP
GLUCOSE SERPL-MCNC: 145 MG/DL — HIGH (ref 70–99)
HCG SERPL-ACNC: 1 MIU/ML — SIGNIFICANT CHANGE UP
HCT VFR BLD CALC: 26.2 % — LOW (ref 34.5–45)
HGB BLD-MCNC: 8.5 G/DL — LOW (ref 11.5–15.5)
ISTAT TOTAL BETA HCG, PLASMA: <5 IU/L — SIGNIFICANT CHANGE UP
LYMPHOCYTES # BLD AUTO: 0.2 K/UL — LOW (ref 1–3.3)
LYMPHOCYTES # BLD AUTO: 2 % — LOW (ref 13–44)
MCHC RBC-ENTMCNC: 31 PG — SIGNIFICANT CHANGE UP (ref 27–34)
MCHC RBC-ENTMCNC: 32.4 GM/DL — SIGNIFICANT CHANGE UP (ref 32–36)
MCV RBC AUTO: 95.6 FL — SIGNIFICANT CHANGE UP (ref 80–100)
NEUTROPHILS # BLD AUTO: 6.8 K/UL — SIGNIFICANT CHANGE UP (ref 1.8–7.4)
NEUTROPHILS NFR BLD AUTO: 89.6 % — HIGH (ref 43–77)
PLATELET # BLD AUTO: 243 K/UL — SIGNIFICANT CHANGE UP (ref 150–400)
POTASSIUM SERPL-MCNC: 3.5 MMOL/L — SIGNIFICANT CHANGE UP (ref 3.5–5.3)
POTASSIUM SERPL-SCNC: 3.5 MMOL/L — SIGNIFICANT CHANGE UP (ref 3.5–5.3)
PROT SERPL-MCNC: 6.9 G/DL — SIGNIFICANT CHANGE UP (ref 6–8.3)
RBC # BLD: 2.74 M/UL — LOW (ref 3.8–5.2)
RBC # FLD: 18.1 % — HIGH (ref 10.3–14.5)
SODIUM SERPL-SCNC: 144 MMOL/L — SIGNIFICANT CHANGE UP (ref 135–145)
TSH SERPL-MCNC: 1.02 UIU/ML — SIGNIFICANT CHANGE UP (ref 0.27–4.2)
WBC # BLD: 7.6 K/UL — SIGNIFICANT CHANGE UP (ref 3.8–10.5)
WBC # FLD AUTO: 7.6 K/UL — SIGNIFICANT CHANGE UP (ref 3.8–10.5)

## 2024-06-19 PROCEDURE — 96413 CHEMO IV INFUSION 1 HR: CPT

## 2024-06-19 PROCEDURE — 80053 COMPREHEN METABOLIC PANEL: CPT

## 2024-06-19 PROCEDURE — 85025 COMPLETE CBC W/AUTO DIFF WBC: CPT

## 2024-06-19 PROCEDURE — 84443 ASSAY THYROID STIM HORMONE: CPT

## 2024-06-19 PROCEDURE — 96375 TX/PRO/DX INJ NEW DRUG ADDON: CPT

## 2024-06-19 PROCEDURE — 96417 CHEMO IV INFUS EACH ADDL SEQ: CPT

## 2024-06-19 PROCEDURE — 36415 COLL VENOUS BLD VENIPUNCTURE: CPT

## 2024-06-19 PROCEDURE — 84702 CHORIONIC GONADOTROPIN TEST: CPT

## 2024-06-19 RX ORDER — PALONOSETRON HYDROCHLORIDE 0.25 MG/5ML
0.25 INJECTION, SOLUTION INTRAVENOUS ONCE
Refills: 0 | Status: COMPLETED | OUTPATIENT
Start: 2024-06-19 | End: 2024-06-19

## 2024-06-19 RX ORDER — CARBOPLATIN 10 MG/ML
131 INJECTION INTRAVENOUS ONCE
Refills: 0 | Status: COMPLETED | OUTPATIENT
Start: 2024-06-19 | End: 2024-06-19

## 2024-06-19 RX ORDER — LIDOCAINE HCL 28 MG/G
1 GEL TOPICAL ONCE
Refills: 0 | Status: COMPLETED | OUTPATIENT
Start: 2024-06-19 | End: 2024-06-19

## 2024-06-19 RX ORDER — DEXAMETHASONE 1 MG/1
10 TABLET ORAL ONCE
Refills: 0 | Status: COMPLETED | OUTPATIENT
Start: 2024-06-19 | End: 2024-06-19

## 2024-06-19 RX ORDER — PACLITAXEL 30 MG/5ML
132 INJECTION, SOLUTION INTRAVENOUS ONCE
Refills: 0 | Status: COMPLETED | OUTPATIENT
Start: 2024-06-19 | End: 2024-06-19

## 2024-06-19 RX ORDER — SODIUM CHLORIDE 0.9 % (FLUSH) 0.9 %
10 SYRINGE (ML) INJECTION ONCE
Refills: 0 | Status: COMPLETED | OUTPATIENT
Start: 2024-06-19 | End: 2024-06-19

## 2024-06-19 RX ORDER — DIPHENHYDRAMINE HCL 12.5MG/5ML
25 ELIXIR ORAL ONCE
Refills: 0 | Status: COMPLETED | OUTPATIENT
Start: 2024-06-19 | End: 2024-06-19

## 2024-06-19 RX ORDER — FAMOTIDINE 40 MG
20 TABLET ORAL ONCE
Refills: 0 | Status: COMPLETED | OUTPATIENT
Start: 2024-06-19 | End: 2024-06-19

## 2024-06-19 RX ADMIN — CARBOPLATIN 131 MILLIGRAM(S): 10 INJECTION INTRAVENOUS at 13:07

## 2024-06-19 RX ADMIN — PALONOSETRON HYDROCHLORIDE 0.25 MILLIGRAM(S): 0.25 INJECTION, SOLUTION INTRAVENOUS at 11:01

## 2024-06-19 RX ADMIN — CARBOPLATIN 131 MILLIGRAM(S): 10 INJECTION INTRAVENOUS at 13:37

## 2024-06-19 RX ADMIN — Medication 202 MILLIGRAM(S): at 11:35

## 2024-06-19 RX ADMIN — PACLITAXEL 132 MILLIGRAM(S): 30 INJECTION, SOLUTION INTRAVENOUS at 12:52

## 2024-06-19 RX ADMIN — Medication 10 MILLILITER(S): at 13:40

## 2024-06-19 RX ADMIN — DEXAMETHASONE 204 MILLIGRAM(S): 1 TABLET ORAL at 11:20

## 2024-06-19 RX ADMIN — PACLITAXEL 132 MILLIGRAM(S): 30 INJECTION, SOLUTION INTRAVENOUS at 11:52

## 2024-06-19 RX ADMIN — Medication 25 MILLIGRAM(S): at 11:50

## 2024-06-19 RX ADMIN — DEXAMETHASONE 10 MILLIGRAM(S): 1 TABLET ORAL at 11:35

## 2024-06-19 RX ADMIN — Medication 20 MILLIGRAM(S): at 11:04

## 2024-06-20 ENCOUNTER — EMERGENCY (EMERGENCY)
Facility: HOSPITAL | Age: 54
LOS: 1 days | Discharge: HOME | End: 2024-06-20
Attending: EMERGENCY MEDICINE | Admitting: EMERGENCY MEDICINE
Payer: COMMERCIAL

## 2024-06-20 VITALS
SYSTOLIC BLOOD PRESSURE: 110 MMHG | HEIGHT: 64 IN | WEIGHT: 128.97 LBS | RESPIRATION RATE: 18 BRPM | OXYGEN SATURATION: 100 % | TEMPERATURE: 98 F | DIASTOLIC BLOOD PRESSURE: 73 MMHG | HEART RATE: 90 BPM

## 2024-06-20 VITALS
OXYGEN SATURATION: 100 % | DIASTOLIC BLOOD PRESSURE: 78 MMHG | HEART RATE: 77 BPM | SYSTOLIC BLOOD PRESSURE: 115 MMHG | RESPIRATION RATE: 18 BRPM

## 2024-06-20 DIAGNOSIS — E78.5 HYPERLIPIDEMIA, UNSPECIFIED: ICD-10-CM

## 2024-06-20 DIAGNOSIS — C50.919 MALIGNANT NEOPLASM OF UNSPECIFIED SITE OF UNSPECIFIED FEMALE BREAST: ICD-10-CM

## 2024-06-20 DIAGNOSIS — I10 ESSENTIAL (PRIMARY) HYPERTENSION: ICD-10-CM

## 2024-06-20 DIAGNOSIS — R05.1 ACUTE COUGH: ICD-10-CM

## 2024-06-20 DIAGNOSIS — Z20.822 CONTACT WITH AND (SUSPECTED) EXPOSURE TO COVID-19: ICD-10-CM

## 2024-06-20 DIAGNOSIS — R19.7 DIARRHEA, UNSPECIFIED: ICD-10-CM

## 2024-06-20 DIAGNOSIS — M19.90 UNSPECIFIED OSTEOARTHRITIS, UNSPECIFIED SITE: ICD-10-CM

## 2024-06-20 DIAGNOSIS — R50.9 FEVER, UNSPECIFIED: ICD-10-CM

## 2024-06-20 LAB
ADD ON TEST-SPECIMEN IN LAB: SIGNIFICANT CHANGE UP
ALBUMIN SERPL ELPH-MCNC: 4.3 G/DL — SIGNIFICANT CHANGE UP (ref 3.3–5)
ALP SERPL-CCNC: 79 U/L — SIGNIFICANT CHANGE UP (ref 40–120)
ALT FLD-CCNC: 82 U/L — HIGH (ref 10–45)
ANION GAP SERPL CALC-SCNC: 13 MMOL/L — SIGNIFICANT CHANGE UP (ref 5–17)
ANISOCYTOSIS BLD QL: SLIGHT — SIGNIFICANT CHANGE UP
APPEARANCE UR: CLEAR — SIGNIFICANT CHANGE UP
AST SERPL-CCNC: 106 U/L — HIGH (ref 10–40)
BACTERIA # UR AUTO: ABNORMAL /HPF
BASOPHILS # BLD AUTO: 0 K/UL — SIGNIFICANT CHANGE UP (ref 0–0.2)
BASOPHILS NFR BLD AUTO: 0 % — SIGNIFICANT CHANGE UP (ref 0–2)
BILIRUB SERPL-MCNC: 0.4 MG/DL — SIGNIFICANT CHANGE UP (ref 0.2–1.2)
BILIRUB UR-MCNC: NEGATIVE — SIGNIFICANT CHANGE UP
BUN SERPL-MCNC: 13 MG/DL — SIGNIFICANT CHANGE UP (ref 7–23)
C DIFF GDH STL QL: NEGATIVE — SIGNIFICANT CHANGE UP
C DIFF GDH STL QL: SIGNIFICANT CHANGE UP
CALCIUM SERPL-MCNC: 9.5 MG/DL — SIGNIFICANT CHANGE UP (ref 8.4–10.5)
CAST: 2 /LPF — SIGNIFICANT CHANGE UP (ref 0–4)
CHLORIDE SERPL-SCNC: 106 MMOL/L — SIGNIFICANT CHANGE UP (ref 96–108)
CO2 SERPL-SCNC: 22 MMOL/L — SIGNIFICANT CHANGE UP (ref 22–31)
COLOR SPEC: SIGNIFICANT CHANGE UP
CREAT SERPL-MCNC: 0.8 MG/DL — SIGNIFICANT CHANGE UP (ref 0.5–1.3)
DACRYOCYTES BLD QL SMEAR: SLIGHT — SIGNIFICANT CHANGE UP
DIFF PNL FLD: NEGATIVE — SIGNIFICANT CHANGE UP
EGFR: 88 ML/MIN/1.73M2 — SIGNIFICANT CHANGE UP
EOSINOPHIL # BLD AUTO: 0.17 K/UL — SIGNIFICANT CHANGE UP (ref 0–0.5)
EOSINOPHIL NFR BLD AUTO: 2.7 % — SIGNIFICANT CHANGE UP (ref 0–6)
GI PCR PANEL: SIGNIFICANT CHANGE UP
GIANT PLATELETS BLD QL SMEAR: PRESENT — SIGNIFICANT CHANGE UP
GLUCOSE SERPL-MCNC: 109 MG/DL — HIGH (ref 70–99)
GLUCOSE UR QL: NEGATIVE MG/DL — SIGNIFICANT CHANGE UP
HCT VFR BLD CALC: 27.1 % — LOW (ref 34.5–45)
HGB BLD-MCNC: 8.8 G/DL — LOW (ref 11.5–15.5)
HYPOCHROMIA BLD QL: SIGNIFICANT CHANGE UP
KETONES UR-MCNC: ABNORMAL MG/DL
LACTATE SERPL-SCNC: 1.5 MMOL/L — SIGNIFICANT CHANGE UP (ref 0.5–2)
LEUKOCYTE ESTERASE UR-ACNC: ABNORMAL
LIDOCAIN IGE QN: 24 U/L — SIGNIFICANT CHANGE UP (ref 7–60)
LYMPHOCYTES # BLD AUTO: 0.29 K/UL — LOW (ref 1–3.3)
LYMPHOCYTES # BLD AUTO: 4.5 % — LOW (ref 13–44)
MACROCYTES BLD QL: SLIGHT — SIGNIFICANT CHANGE UP
MANUAL SMEAR VERIFICATION: SIGNIFICANT CHANGE UP
MCHC RBC-ENTMCNC: 31.4 PG — SIGNIFICANT CHANGE UP (ref 27–34)
MCHC RBC-ENTMCNC: 32.5 GM/DL — SIGNIFICANT CHANGE UP (ref 32–36)
MCV RBC AUTO: 96.8 FL — SIGNIFICANT CHANGE UP (ref 80–100)
MICROCYTES BLD QL: SLIGHT — SIGNIFICANT CHANGE UP
MONOCYTES # BLD AUTO: 0 K/UL — SIGNIFICANT CHANGE UP (ref 0–0.9)
MONOCYTES NFR BLD AUTO: 0 % — LOW (ref 2–14)
NEUTROPHILS # BLD AUTO: 5.95 K/UL — SIGNIFICANT CHANGE UP (ref 1.8–7.4)
NEUTROPHILS NFR BLD AUTO: 92.8 % — HIGH (ref 43–77)
NITRITE UR-MCNC: NEGATIVE — SIGNIFICANT CHANGE UP
OVALOCYTES BLD QL SMEAR: SLIGHT — SIGNIFICANT CHANGE UP
PH UR: 6 — SIGNIFICANT CHANGE UP (ref 5–8)
PLAT MORPH BLD: ABNORMAL
PLATELET # BLD AUTO: 218 K/UL — SIGNIFICANT CHANGE UP (ref 150–400)
POIKILOCYTOSIS BLD QL AUTO: SLIGHT — SIGNIFICANT CHANGE UP
POLYCHROMASIA BLD QL SMEAR: SLIGHT — SIGNIFICANT CHANGE UP
POTASSIUM SERPL-MCNC: 3.2 MMOL/L — LOW (ref 3.5–5.3)
POTASSIUM SERPL-SCNC: 3.2 MMOL/L — LOW (ref 3.5–5.3)
PROT SERPL-MCNC: 7.4 G/DL — SIGNIFICANT CHANGE UP (ref 6–8.3)
PROT UR-MCNC: SIGNIFICANT CHANGE UP MG/DL
RAPID RVP RESULT: SIGNIFICANT CHANGE UP
RBC # BLD: 2.8 M/UL — LOW (ref 3.8–5.2)
RBC # FLD: 18.4 % — HIGH (ref 10.3–14.5)
RBC BLD AUTO: ABNORMAL
RBC CASTS # UR COMP ASSIST: 1 /HPF — SIGNIFICANT CHANGE UP (ref 0–4)
SARS-COV-2 RNA SPEC QL NAA+PROBE: SIGNIFICANT CHANGE UP
SCHISTOCYTES BLD QL AUTO: SLIGHT — SIGNIFICANT CHANGE UP
SODIUM SERPL-SCNC: 141 MMOL/L — SIGNIFICANT CHANGE UP (ref 135–145)
SP GR SPEC: 1.03 — SIGNIFICANT CHANGE UP (ref 1–1.03)
SPHEROCYTES BLD QL SMEAR: SLIGHT — SIGNIFICANT CHANGE UP
SQUAMOUS # UR AUTO: 7 /HPF — HIGH (ref 0–5)
UROBILINOGEN FLD QL: 1 MG/DL — SIGNIFICANT CHANGE UP (ref 0.2–1)
WBC # BLD: 6.41 K/UL — SIGNIFICANT CHANGE UP (ref 3.8–10.5)
WBC # FLD AUTO: 6.41 K/UL — SIGNIFICANT CHANGE UP (ref 3.8–10.5)
WBC UR QL: 9 /HPF — HIGH (ref 0–5)

## 2024-06-20 PROCEDURE — 87086 URINE CULTURE/COLONY COUNT: CPT

## 2024-06-20 PROCEDURE — 71046 X-RAY EXAM CHEST 2 VIEWS: CPT | Mod: 26

## 2024-06-20 PROCEDURE — 81001 URINALYSIS AUTO W/SCOPE: CPT

## 2024-06-20 PROCEDURE — 84100 ASSAY OF PHOSPHORUS: CPT

## 2024-06-20 PROCEDURE — 99285 EMERGENCY DEPT VISIT HI MDM: CPT | Mod: 25

## 2024-06-20 PROCEDURE — 87186 SC STD MICRODIL/AGAR DIL: CPT

## 2024-06-20 PROCEDURE — 87449 NOS EACH ORGANISM AG IA: CPT

## 2024-06-20 PROCEDURE — 71046 X-RAY EXAM CHEST 2 VIEWS: CPT

## 2024-06-20 PROCEDURE — 83605 ASSAY OF LACTIC ACID: CPT

## 2024-06-20 PROCEDURE — 0225U NFCT DS DNA&RNA 21 SARSCOV2: CPT

## 2024-06-20 PROCEDURE — 83690 ASSAY OF LIPASE: CPT

## 2024-06-20 PROCEDURE — 80053 COMPREHEN METABOLIC PANEL: CPT

## 2024-06-20 PROCEDURE — 87507 IADNA-DNA/RNA PROBE TQ 12-25: CPT

## 2024-06-20 PROCEDURE — 93005 ELECTROCARDIOGRAM TRACING: CPT

## 2024-06-20 PROCEDURE — 85025 COMPLETE CBC W/AUTO DIFF WBC: CPT

## 2024-06-20 PROCEDURE — 83735 ASSAY OF MAGNESIUM: CPT

## 2024-06-20 PROCEDURE — 36415 COLL VENOUS BLD VENIPUNCTURE: CPT

## 2024-06-20 PROCEDURE — 93010 ELECTROCARDIOGRAM REPORT: CPT

## 2024-06-20 PROCEDURE — 87324 CLOSTRIDIUM AG IA: CPT

## 2024-06-20 PROCEDURE — 87040 BLOOD CULTURE FOR BACTERIA: CPT

## 2024-06-20 PROCEDURE — 99285 EMERGENCY DEPT VISIT HI MDM: CPT

## 2024-06-20 RX ORDER — LOPERAMIDE HCL 2 MG
1 TABLET ORAL
Qty: 40 | Refills: 0
Start: 2024-06-20 | End: 2024-06-29

## 2024-06-20 RX ORDER — LOPERAMIDE HCL 2 MG
2 TABLET ORAL ONCE
Refills: 0 | Status: COMPLETED | OUTPATIENT
Start: 2024-06-20 | End: 2024-06-20

## 2024-06-20 RX ORDER — SODIUM CHLORIDE 9 MG/ML
1000 INJECTION INTRAMUSCULAR; INTRAVENOUS; SUBCUTANEOUS ONCE
Refills: 0 | Status: COMPLETED | OUTPATIENT
Start: 2024-06-20 | End: 2024-06-20

## 2024-06-20 RX ADMIN — SODIUM CHLORIDE 1000 MILLILITER(S): 9 INJECTION INTRAMUSCULAR; INTRAVENOUS; SUBCUTANEOUS at 15:38

## 2024-06-20 RX ADMIN — SODIUM CHLORIDE 1000 MILLILITER(S): 9 INJECTION INTRAMUSCULAR; INTRAVENOUS; SUBCUTANEOUS at 18:21

## 2024-06-20 RX ADMIN — Medication 2 MILLIGRAM(S): at 18:21

## 2024-06-20 NOTE — ED PROVIDER NOTE - NSFOLLOWUPINSTRUCTIONS_ED_ALL_ED_FT
Diarrhea    Diarrhea is frequent loose or watery bowel movements that has many causes. Diarrhea can make you feel weak and cause you to become dehydrated. Diarrhea typically lasts 2–3 days, but can last longer if it is a sign of something more serious. Drink clear fluids to prevent dehydration. Eat bland, easy-to-digest foods as tolerated.     SEEK IMMEDIATE MEDICAL CARE IF YOU HAVE ANY OF THE FOLLOWING SYMPTOMS: high fevers, lightheadedness/dizziness, chest pain, black or bloody stools, shortness of breath, severe abdominal or back pain, or any signs of dehydration.     follow up with your oncologist,

## 2024-06-20 NOTE — ED ADULT TRIAGE NOTE - CHIEF COMPLAINT QUOTE
+ diarrhea described as "watery" x 3 days  + fever x 2 days, noted yesterday, tmax 10  pmh breast ca on chemo 6/19, took tylenol 10am

## 2024-06-20 NOTE — ED PROVIDER NOTE - PATIENT PORTAL LINK FT
You can access the FollowMyHealth Patient Portal offered by [facility] by registering at the following website: [email or web address]. By joining [facility]’s FollowMyHealth portal, you will also be able to view your health information using other applications (apps) compatible with our system.

## 2024-06-20 NOTE — ED ADULT NURSE NOTE - OBJECTIVE STATEMENT
Pt A&Ox4 and able to speak in complete sentences. Pt breathing even and unlabored with equal chest rise and fall. Pt arrived d/t worsening diarrhea and general malaise over the past three days. Pt endorsed having fevers over the past two days, this AM was 101.2F oral. Pt endorsed taking tylenol. Rectal temp done and was 99.1F. Pt hx of breast CA and receiving chemo treatment, last received 6/20. Pt denies n, v, lightheadedness, sob, cp, numbness, tingling, hematuria, dark stools.

## 2024-06-20 NOTE — ED PROVIDER NOTE - CPE EDP CARDIAC NORM
Problem: Ineffective Coping  Goal: Demonstrates healthy coping skills  Outcome: Progressing     Problem: Risk for Self Injury/Neglect  Goal: Verbalize thoughts and feelings  Description: Interventions:  - Assess and re-assess patient's lethality and potential for self-injury  - Engage patient in 1:1 interactions, daily, for a minimum of 15 minutes  - Encourage patient to express feelings, fears, frustrations, hopes  - Establish rapport/trust with patient   Outcome: Progressing     Problem: Depression  Goal: Verbalize thoughts and feelings  Description: Interventions:  - Assess and re-assess patient's level of risk   - Engage patient in 1:1 interactions, daily, for a minimum of 15 minutes   - Encourage patient to express feelings, fears, frustrations, hopes   Outcome: Progressing  Goal: Refrain from isolation  Description: Interventions:  - Develop a trusting relationship   - Encourage socialization   Outcome: Progressing     Problem: Anxiety  Goal: Anxiety is at manageable level  Description: Interventions:  - Assess and monitor patient's anxiety level. - Monitor for signs and symptoms (heart palpitations, chest pain, shortness of breath, headaches, nausea, feeling jumpy, restlessness, irritable, apprehensive). - Collaborate with interdisciplinary team and initiate plan and interventions as ordered.   - [place] patient to unit/surroundings  - Explain treatment plan  - Encourage participation in care  - Encourage verbalization of concerns/fears  - Identify coping mechanisms  - Assist in developing anxiety-reducing skills  - Administer/offer alternative therapies  - Limit or eliminate stimulants  Outcome: Progressing normal...

## 2024-06-20 NOTE — ED PROVIDER NOTE - SKIN, MLM
Skin normal color for race, warm, dry and intact. No evidence of rash. no skin involvement of breast CA  , no reddness or tenderness over mass known  in Rt breast

## 2024-06-20 NOTE — ED ADULT NURSE NOTE - NSFALLUNIVINTERV_ED_ALL_ED
Bed/Stretcher in lowest position, wheels locked, appropriate side rails in place/Call bell, personal items and telephone in reach/Instruct patient to call for assistance before getting out of bed/chair/stretcher/Non-slip footwear applied when patient is off stretcher/[place] to call system/Physically safe environment - no spills, clutter or unnecessary equipment/Purposeful proactive rounding/Room/bathroom lighting operational, light cord in reach

## 2024-06-20 NOTE — ED PROVIDER NOTE - CLINICAL SUMMARY MEDICAL DECISION MAKING FREE TEXT BOX
patient 3 days of diarrhea in the setting of   of chemotherapy for breast cancer,  no significant tenderness on exam, doubt colitis,  doubt diverticulitis,   doubt appendicitis, pancreatitis,  consider viral gastroenteritis, consider chemo side effects, as patient immunocompromise also consider C. difficile and bacterial diarrhea, will send C. difficile and GI PCR if able to obtain stool sample.  will hydrate, UA x-ray and blood cultures for further infection evaluation in immunocompromise patient.    Last 2 oncology notes reviewed, patient reports she connected with her oncology office and was told to come here for evaluation.  Patient sees Dr. Toña Davila .

## 2024-06-20 NOTE — ED PROVIDER NOTE - OBJECTIVE STATEMENT
52 yo Pt with triple negative , poorly differentiated invasive Ductal CA , ho of HTN, HTLD, OA ,  last chemo yesterday, as per onc note ( paclitazel, carboplatin and pembrolizumab),   yesterday was 4th treatment of this regime. Patient reports first 3 rounds she had no side effects, had a dry cough 2 weeks ago which is mostly resolved and had an x-ray which was okay  on6/11,  patient now with new diarrhea for 3 days, reports its about 6 times a day, no associated abdominal pain, no black or bloody stools, reports diarrhea is brown and becomes more watery as the day progresses, no recent antibiotics, new fevers for the last 2 days 101 yesterday, no congestion, no sore throat, no nausea or vomiting, no urinary symptoms

## 2024-06-20 NOTE — ED PROVIDER NOTE - PROGRESS NOTE DETAILS
feels improved, discussed discharge plan with Onc fellow , advised to give imodium as needed , discussed emergent return instructions with patient.

## 2024-06-21 ENCOUNTER — NON-APPOINTMENT (OUTPATIENT)
Age: 54
End: 2024-06-21

## 2024-06-24 LAB
-  AMOXICILLIN/CLAVULANIC ACID: SIGNIFICANT CHANGE UP
-  AMPICILLIN/SULBACTAM: SIGNIFICANT CHANGE UP
-  AMPICILLIN: SIGNIFICANT CHANGE UP
-  CEFAZOLIN: SIGNIFICANT CHANGE UP
-  CEFEPIME: SIGNIFICANT CHANGE UP
-  CEFOXITIN: SIGNIFICANT CHANGE UP
-  CEFTRIAXONE: SIGNIFICANT CHANGE UP
-  CEFUROXIME: SIGNIFICANT CHANGE UP
-  CIPROFLOXACIN: SIGNIFICANT CHANGE UP
-  GENTAMICIN: SIGNIFICANT CHANGE UP
-  LEVOFLOXACIN: SIGNIFICANT CHANGE UP
-  NITROFURANTOIN: SIGNIFICANT CHANGE UP
-  PIPERACILLIN/TAZOBACTAM: SIGNIFICANT CHANGE UP
-  TOBRAMYCIN: SIGNIFICANT CHANGE UP
-  TRIMETHOPRIM/SULFAMETHOXAZOLE: SIGNIFICANT CHANGE UP
CULTURE RESULTS: ABNORMAL
METHOD TYPE: SIGNIFICANT CHANGE UP
ORGANISM # SPEC MICROSCOPIC CNT: ABNORMAL
ORGANISM # SPEC MICROSCOPIC CNT: SIGNIFICANT CHANGE UP
SPECIMEN SOURCE: SIGNIFICANT CHANGE UP

## 2024-06-25 PROBLEM — C50.919 MALIGNANT NEOPLASM OF UNSPECIFIED SITE OF UNSPECIFIED FEMALE BREAST: Chronic | Status: ACTIVE | Noted: 2024-06-20

## 2024-06-26 ENCOUNTER — OUTPATIENT (OUTPATIENT)
Dept: OUTPATIENT SERVICES | Facility: HOSPITAL | Age: 54
LOS: 1 days | End: 2024-06-26
Payer: COMMERCIAL

## 2024-06-26 ENCOUNTER — APPOINTMENT (OUTPATIENT)
Dept: INFUSION THERAPY | Facility: CLINIC | Age: 54
End: 2024-06-26

## 2024-06-26 ENCOUNTER — LABORATORY RESULT (OUTPATIENT)
Age: 54
End: 2024-06-26

## 2024-06-26 ENCOUNTER — APPOINTMENT (OUTPATIENT)
Dept: HEMATOLOGY ONCOLOGY | Facility: CLINIC | Age: 54
End: 2024-06-26
Payer: COMMERCIAL

## 2024-06-26 VITALS
TEMPERATURE: 98.9 F | RESPIRATION RATE: 18 BRPM | DIASTOLIC BLOOD PRESSURE: 84 MMHG | WEIGHT: 125 LBS | HEIGHT: 64 IN | OXYGEN SATURATION: 100 % | SYSTOLIC BLOOD PRESSURE: 129 MMHG | BODY MASS INDEX: 21.34 KG/M2 | HEART RATE: 105 BPM

## 2024-06-26 VITALS
RESPIRATION RATE: 18 BRPM | SYSTOLIC BLOOD PRESSURE: 129 MMHG | WEIGHT: 125 LBS | HEIGHT: 64 IN | OXYGEN SATURATION: 100 % | DIASTOLIC BLOOD PRESSURE: 84 MMHG | HEART RATE: 105 BPM | TEMPERATURE: 99 F

## 2024-06-26 VITALS
SYSTOLIC BLOOD PRESSURE: 124 MMHG | OXYGEN SATURATION: 99 % | DIASTOLIC BLOOD PRESSURE: 75 MMHG | RESPIRATION RATE: 18 BRPM | TEMPERATURE: 98 F | HEART RATE: 85 BPM

## 2024-06-26 DIAGNOSIS — T45.1X5D ADVERSE EFFECT OF ANTINEOPLASTIC AND IMMUNOSUPPRESSIVE DRUGS, SUBSEQUENT ENCOUNTER: ICD-10-CM

## 2024-06-26 DIAGNOSIS — K52.1 TOXIC GASTROENTERITIS AND COLITIS: ICD-10-CM

## 2024-06-26 DIAGNOSIS — C77.9 MALIGNANT NEOPLASM OF UNSPECIFIED SITE OF UNSPECIFIED FEMALE BREAST: ICD-10-CM

## 2024-06-26 DIAGNOSIS — C50.919 MALIGNANT NEOPLASM OF UNSPECIFIED SITE OF UNSPECIFIED FEMALE BREAST: ICD-10-CM

## 2024-06-26 DIAGNOSIS — R19.7 DIARRHEA, UNSPECIFIED: ICD-10-CM

## 2024-06-26 DIAGNOSIS — L65.9 NONSCARRING HAIR LOSS, UNSPECIFIED: ICD-10-CM

## 2024-06-26 DIAGNOSIS — C50.911 MALIGNANT NEOPLASM OF UNSPECIFIED SITE OF RIGHT FEMALE BREAST: ICD-10-CM

## 2024-06-26 LAB
ALBUMIN SERPL ELPH-MCNC: 3.9 G/DL
ALP BLD-CCNC: 64 U/L
ALT SERPL-CCNC: 76 U/L
ANION GAP SERPL CALC-SCNC: 10 MMOL/L
AST SERPL-CCNC: 71 U/L
BILIRUB SERPL-MCNC: 0.5 MG/DL
BUN SERPL-MCNC: 8 MG/DL
CALCIUM SERPL-MCNC: 9.9 MG/DL
CHLORIDE SERPL-SCNC: 106 MMOL/L
CO2 SERPL-SCNC: 28 MMOL/L
CREAT SERPL-MCNC: 0.5 MG/DL
CULTURE RESULTS: SIGNIFICANT CHANGE UP
CULTURE RESULTS: SIGNIFICANT CHANGE UP
EGFR: 112 ML/MIN/1.73M2
ESTIMATED AVERAGE GLUCOSE: 97 MG/DL
FERRITIN SERPL-MCNC: 250 NG/ML
GLUCOSE SERPL-MCNC: 112 MG/DL
HBA1C MFR BLD HPLC: 5 %
HCT VFR BLD CALC: 27 %
HGB BLD-MCNC: 8.7 G/DL
IRON SATN MFR SERPL: 21 %
IRON SERPL-MCNC: 50 UG/DL
LYMPHOCYTES # BLD AUTO: 0.5 K/UL
LYMPHOCYTES NFR BLD AUTO: 14.8 %
MAGNESIUM SERPL-MCNC: 1.7 MG/DL
MAN DIFF?: NO
MCHC RBC-ENTMCNC: 31.3 PG
MCHC RBC-ENTMCNC: 32.2 GM/DL
MCV RBC AUTO: 97.1 FL
NEUTROPHILS # BLD AUTO: 2.6 K/UL
NEUTROPHILS NFR BLD AUTO: 72.5 %
PLATELET # BLD AUTO: 187 K/UL
POTASSIUM SERPL-SCNC: 4 MMOL/L
PROT SERPL-MCNC: 6.8 G/DL
RBC # BLD: 2.78 M/UL
RBC # FLD: 18.1 %
SODIUM SERPL-SCNC: 144 MMOL/L
SPECIMEN SOURCE: SIGNIFICANT CHANGE UP
SPECIMEN SOURCE: SIGNIFICANT CHANGE UP
TIBC SERPL-MCNC: 238 UG/DL
UIBC SERPL-MCNC: 188 UG/DL
WBC # FLD AUTO: 3.6 K/UL

## 2024-06-26 PROCEDURE — 96413 CHEMO IV INFUSION 1 HR: CPT

## 2024-06-26 PROCEDURE — 96375 TX/PRO/DX INJ NEW DRUG ADDON: CPT

## 2024-06-26 PROCEDURE — 99214 OFFICE O/P EST MOD 30 MIN: CPT

## 2024-06-26 PROCEDURE — 96417 CHEMO IV INFUS EACH ADDL SEQ: CPT

## 2024-06-26 PROCEDURE — G2211 COMPLEX E/M VISIT ADD ON: CPT | Mod: NC

## 2024-06-26 RX ORDER — PACLITAXEL 30 MG/5ML
132 INJECTION, SOLUTION INTRAVENOUS ONCE
Refills: 0 | Status: COMPLETED | OUTPATIENT
Start: 2024-06-26 | End: 2024-06-26

## 2024-06-26 RX ORDER — LIDOCAINE HCL 28 MG/G
1 GEL TOPICAL ONCE
Refills: 0 | Status: COMPLETED | OUTPATIENT
Start: 2024-06-26 | End: 2024-06-26

## 2024-06-26 RX ORDER — PALONOSETRON HYDROCHLORIDE 0.25 MG/5ML
0.25 INJECTION, SOLUTION INTRAVENOUS ONCE
Refills: 0 | Status: COMPLETED | OUTPATIENT
Start: 2024-06-26 | End: 2024-06-26

## 2024-06-26 RX ORDER — FAMOTIDINE 40 MG
20 TABLET ORAL ONCE
Refills: 0 | Status: COMPLETED | OUTPATIENT
Start: 2024-06-26 | End: 2024-06-26

## 2024-06-26 RX ORDER — PEMBROLIZUMAB 25 MG/ML
400 INJECTION, SOLUTION INTRAVENOUS ONCE
Refills: 0 | Status: COMPLETED | OUTPATIENT
Start: 2024-06-26 | End: 2024-06-26

## 2024-06-26 RX ORDER — DIPHENHYDRAMINE HCL 12.5MG/5ML
25 ELIXIR ORAL ONCE
Refills: 0 | Status: COMPLETED | OUTPATIENT
Start: 2024-06-26 | End: 2024-06-26

## 2024-06-26 RX ORDER — DEXAMETHASONE 1 MG/1
10 TABLET ORAL ONCE
Refills: 0 | Status: COMPLETED | OUTPATIENT
Start: 2024-06-26 | End: 2024-06-26

## 2024-06-26 RX ORDER — SODIUM CHLORIDE 0.9 % (FLUSH) 0.9 %
10 SYRINGE (ML) INJECTION ONCE
Refills: 0 | Status: COMPLETED | OUTPATIENT
Start: 2024-06-26 | End: 2024-06-26

## 2024-06-26 RX ORDER — CARBOPLATIN 10 MG/ML
131 INJECTION INTRAVENOUS ONCE
Refills: 0 | Status: COMPLETED | OUTPATIENT
Start: 2024-06-26 | End: 2024-06-26

## 2024-06-26 RX ADMIN — Medication 202 MILLIGRAM(S): at 16:14

## 2024-06-26 RX ADMIN — CARBOPLATIN 131 MILLIGRAM(S): 10 INJECTION INTRAVENOUS at 16:30

## 2024-06-26 RX ADMIN — PEMBROLIZUMAB 400 MILLIGRAM(S): 25 INJECTION, SOLUTION INTRAVENOUS at 15:16

## 2024-06-26 RX ADMIN — Medication 10 MILLILITER(S): at 18:20

## 2024-06-26 RX ADMIN — DEXAMETHASONE 204 MILLIGRAM(S): 1 TABLET ORAL at 15:56

## 2024-06-26 RX ADMIN — PALONOSETRON HYDROCHLORIDE 0.25 MILLIGRAM(S): 0.25 INJECTION, SOLUTION INTRAVENOUS at 16:33

## 2024-06-26 RX ADMIN — Medication 20 MILLIGRAM(S): at 16:33

## 2024-06-26 RX ADMIN — PACLITAXEL 132 MILLIGRAM(S): 30 INJECTION, SOLUTION INTRAVENOUS at 16:29

## 2024-06-27 LAB
FOLATE SERPL-MCNC: 14.7 NG/ML
TSH SERPL-ACNC: 0.67 UIU/ML
VIT B12 SERPL-MCNC: >2000 PG/ML

## 2024-06-28 PROBLEM — C50.911 BREAST CANCER, RIGHT: Status: ACTIVE | Noted: 2024-03-15

## 2024-06-28 PROBLEM — L65.9 ALOPECIA: Status: ACTIVE | Noted: 2024-06-26

## 2024-06-28 PROBLEM — K52.1 DIARRHEA DUE TO DRUG: Status: ACTIVE | Noted: 2024-06-26

## 2024-06-28 NOTE — HISTORY OF PRESENT ILLNESS
[Disease: _____________________] : Disease: [unfilled] [de-identified] : 53 year old female with newly diagnosed multifocal triple negative right breast cancer is referred by Dr. Will Trejo to discuss neoadjuvant systemic therapy. Here today for f/u and tx.   Patient palpated a lump in right breast.  2/21/24 R breast US: New 3 mm hypoechoic mass right 6:00 3 cm from the nipple. New 1 cm hypoechoic mass with indistinct margins right 9:00 4 cm from the nipple . 1.5 x 0.9 x 1.7 cm irregular hypoechoic mass right 10:00 4 cm from the nipple underlying region of palpable mass.  New 1.9 x 0.4 cm irregular hypoechoic mass right 10:00 5 cm from the nipple underlying region of palpable mass. New 1.6 x 1.2 x 1.8 cm irregular hypoechoic mass with cystic component right 10:00 6 cm from the nipple underlying region of palpable mass. Lymph nodes with thickened cortex right axillary region.  3/5/24 b/l dx MG BREAST COMPOSITION:  The breasts are extremely dense, which lowers the sensitivity of mammography.  Spot compression views of the upper outer right breast do not demonstrate any distinct mammographic abnormalities, probably due to the dense nature of the patient's breast. No suspicious masses, architectural distortion, or significant calcifications are detected in the left breast. b/l US: - 6 o'clock axis, 3 cm from the nipple, 0.3 x 0.3 x 0.4 cm ovoid circumscribed hypoechoic mass -9 o'clock axis, 4 cm from the nipple, 1.0 x 0.6 x 0.5 cm ovoid parallel hypoechoic mass with irregular margins. Ultrasound-guided biopsy is recommended. -10 o'clock axis, 4 cm from the nipple, 1.6 x 0.9 x 1.7 cm ovoid parallel hypoechoic mass with irregular margins,  -10 o'clock axis, 4 cm from the nipple, 1.4 x 0.8 x 1.5 cm ovoid parallel hypoechoic mass with irregular margins -10 o'clock axis, 5 cm from the nipple, 2.5 x 0.7 x 1.4 cm irregular hypoechoic mass -10 o'clock axis, 6 cm from the nipple, 2.6 x 1.3 x 1.9 cm irregular hypoechoic mass. -The irregular hypoechoic masses at the 10 o'clock axis, spanning the 4 to 6 cm from the nipple region, appear to be contiguous with one another and can be biopsied simultaneously. -Abnormally thickened right axillary lymph nodes, with the most prominent measuring 1.9 x 0.9 x 1.7 cm. No suspicious solid or complex cystic mass is detected in the left breast. No morphologically abnormal axillary lymph nodes are detected.   3/5/24 US guided biopsy x 3: 1. Right breast 10 o'clock axis, 4-6 cm from the nipple, core biopsy: -Poorly differentiated invasive ductal carcinoma, measuring 0.9 cm in maximal length in this material  ER 0%, NV 0%, HER2 1+, Ki-67 60% -Ductal carcinoma in situ is also present, intermediate to high nuclear grade, solid pattern with extension into lobules. 2. Right breast 9 o'clock axis, 4 cm from the nipple, core biopsy: -Poorly differentiated invasive ductal carcinoma morphologically similar to specimen 1, measuring 0.5 cm in maximal length in this material. ER 0%, NV 0%, HER2 0, Ki-67 44% -Ductal carcinoma in situ is also present, intermediate to high nuclear grade, solid pattern, with extension into lobules. 3. Right axilla, core biopsy: Metastatic carcinoma involving lymph node tissue and morphologically similar to specimen 1 and 2  3/26/24: MRI B/L Breast: Biopsy-proven right breast malignancy measuring at least 5.5 cm. Possible satellite upper central right breast, 2 closed the chest wall to biopsy. Recommend surgical/oncologic managment. Metastatic lymph node in the right axilla. No evidence of left breast malignancy.   [de-identified] : poorly differentiated invasive ductal carcinoma [de-identified] : ER-, [place]-, HER2 0-1+, Ki-67 44-60% [de-identified] : Here to continue treatment with weekly carbo/taxol and pembro Q6wks.  Reports a dry cough associated with nasal congestion/sneezing, especially when outside, but also a cough which patient thinks is worse on lying flat.   Denies fever/chills, sob/dobbs, sore throat, chest pain, diarrhea, vomiting, neuropathy, rash.  6/20 Pt called with complaints of frequent loose stools up to 6x/day and fever of up to 101F today. Last chemo treatment 6/19/24. Asked pt to go to the ER; pt expressed verbal understanding and agreement with plan to rule out infection, address infection if present and receive IV hydration as well in ER. Stated will go to [facility] ED today IVF and discharged. Taking immodium 3-4/d  6/26/24 bm stool still loose. 2 lb wt loss, on medical leave , c/o fatigue, no cough , CP or SOB, hot flushes. LMP 2/23/24, occ light headed with change position, no paresthesias, alopecia, no n/v

## 2024-06-28 NOTE — PHYSICAL EXAM
[Restricted in physically strenuous activity but ambulatory and able to carry out work of a light or sedentary nature] : Status 1- Restricted in physically strenuous activity but ambulatory and able to carry out work of a light or sedentary nature, e.g., light house work, office work [Normal] : affect appropriate [Thin] : thin [de-identified] : rt breast 1 cm uoq, lt unremarkable no palp LN

## 2024-06-28 NOTE — REVIEW OF SYSTEMS
[Diarrhea: Grade 0] : Diarrhea: Grade 0 [Negative] : Allergic/Immunologic [Fatigue] : fatigue [Recent Change In Weight] : ~T recent weight change

## 2024-06-28 NOTE — ASSESSMENT
[FreeTextEntry1] : 53 year old perimenopausal woman (LMP 10/2023) with newly diagnosed multifocal triple negative right breast cancer is referred by Dr. Will Trejo to discuss neoadjuvant systemic therapy.  cT3 cN1 Stage IIIC poorly diff IDC ER 0% [place] 0% HER2 renetta 1+ Ki 67 60%   Extensive discussion with patient, her  and son regarding the type of cancer, triple negative, the clinical stage necessitating neoadjuvant chemotherapy together with immunotherapy as per Keynote 522 clinical trial - ddAC with Neulasta [id number] x4 cycles followed by weekly Carboplatin/Taxol x12 concurrently with Pembrolizumab Q6wks during this treatment and followed after surgery as well every 3 wks x9 cycles. Mentioned in case of lack of pCR, capecitabine to be recommended. Also will need to see rad onc post op.   Reviewed potential side effects of chemotherapy and immunotherapy providing patient with patient information on each of the drugs above. Reviewed that chemotherapy can cause fatigue, hair loss, allergic/infusion reactions, skin changes, poor appetite, raise risk of infection by causing bone marrow suppression with subsequent low blood counts, potentially needing blood transfusions, also growth factor support to shorten duration of neutropenia, also nausea/vomiting, loose stools, musculoskeletal pain, peripheral neuropathy, infertility and teratogenic effects, as well as small risk of cardiomyopathy/heart failure and small risk of secondary malignancies. Also reviewed side effects that can arise from immunotherapy including but not limited to inflammatory conditions involving different organs, leading to hypothyroidism in case the thyroid is affected by treatment, colitis in case the colon is involved, pericarditis, pneumonitis etc.   Visit 4/3/24: C1 ddAC and pembro ECHO 3/27/24 reviewed: EF 60-65% PET 3/27/24: showed enhancing lesion in R breast c/w her newly diagnosed breast cancer in addition to R axillary LAD. Focal hypermetabolic activity also noted in L colon which warrants GI evaluation at a later date. Patient reminded of need for colonoscopy. (she never had one) Olanzapine, prochlorperazine and lidocaine cream prescriptions sent to her pharmacy. Dietician will see her chairside to address her nutrition questions. She has been cleared for treatment using labs from 3/18/24  Visit 4/17/24: Patient with response to treatment after the first cycle of AC with Neulasta.  Tolerating treatment well with fatigue but otherwise no problems.  To proceed with C2 ddAC with Neulasta Onpro. Olanzapine starting tonight, instructed to take once nightly x4 days total.  Compazine prn nausea, olanzapine 1tab/day for four days starting day of chemo. Instructed to call right away or go to ER in case of fever/temp of 100.4F or higher.   Visit 5/1/24: Labs reviewed and patient cleared for C3 ddAC with Neulasta Onpro. Reports minimal nausea and nail discoloration, otherwise no other reported AEs. RTC 5/15 for follow up and C4  5/15/24 Labs reviewed and patient cleared for C4 ddAC with Neulasta Onpro. She is so far tolerating the treatment well.  Reviewed side effects of weekly Paclitaxel, Carboplatin, and additional questions regarding the next phase of the treatment were answered. Pembrolizumab to continue [id number]. Reassurance provided regarding concerns about dietary restrictions. Consent obtained.  6/12/24 To proceed with 3rd weekly carbo/taxol today.  Labs within parameter. Pembro to continue Q6wks as well.  Ech brandon EF Reviewed side effects of weekly Paclitaxel, Carboplatin, and additional questions regarding the next phase of the treatment were answered. Pembrolizumab to continue [id number]. 6/19 chemo 6/2024  ER 6/26 Reviewed events -S/P ACX4 with pembro q 6wks now/ cont pembrowkly with wkly taxol/cbcda 5/12 e discussed stool management - loose now not diarrheal - discussed low fiber diet - nutritioneval.Cont chemo - f/u 2-3 wks

## 2024-07-02 RX ORDER — ACETAMINOPHEN 325 MG
650 TABLET ORAL ONCE
Refills: 0 | Status: COMPLETED | OUTPATIENT
Start: 2024-07-03 | End: 2024-07-10

## 2024-07-03 ENCOUNTER — APPOINTMENT (OUTPATIENT)
Dept: INFUSION THERAPY | Facility: CLINIC | Age: 54
End: 2024-07-03

## 2024-07-03 ENCOUNTER — OUTPATIENT (OUTPATIENT)
Dept: OUTPATIENT SERVICES | Facility: HOSPITAL | Age: 54
LOS: 1 days | End: 2024-07-03
Payer: COMMERCIAL

## 2024-07-03 DIAGNOSIS — C50.919 MALIGNANT NEOPLASM OF UNSPECIFIED SITE OF UNSPECIFIED FEMALE BREAST: ICD-10-CM

## 2024-07-03 LAB
ALBUMIN SERPL ELPH-MCNC: 3.8 G/DL — SIGNIFICANT CHANGE UP (ref 3.3–5)
ALP SERPL-CCNC: 67 U/L — SIGNIFICANT CHANGE UP (ref 40–120)
ALT FLD-CCNC: 51 U/L — HIGH (ref 10–45)
ANION GAP SERPL CALC-SCNC: 11 MMOL/L — SIGNIFICANT CHANGE UP (ref 5–17)
ANISOCYTOSIS BLD QL: SLIGHT — SIGNIFICANT CHANGE UP
AST SERPL-CCNC: 49 U/L — HIGH (ref 10–40)
BASOPHILS NFR BLD AUTO: 2 % — SIGNIFICANT CHANGE UP (ref 0–2)
BILIRUB SERPL-MCNC: 0.6 MG/DL — SIGNIFICANT CHANGE UP (ref 0.2–1.2)
BUN SERPL-MCNC: 11 MG/DL — SIGNIFICANT CHANGE UP (ref 7–23)
CALCIUM SERPL-MCNC: 10.3 MG/DL — SIGNIFICANT CHANGE UP (ref 8.4–10.5)
CHLORIDE SERPL-SCNC: 102 MMOL/L — SIGNIFICANT CHANGE UP (ref 96–108)
CO2 SERPL-SCNC: 28 MMOL/L — SIGNIFICANT CHANGE UP (ref 22–31)
CREAT SERPL-MCNC: 0.8 MG/DL — SIGNIFICANT CHANGE UP (ref 0.5–1.3)
DACRYOCYTES BLD QL SMEAR: SLIGHT — SIGNIFICANT CHANGE UP
EGFR: 88 ML/MIN/1.73M2 — SIGNIFICANT CHANGE UP
EOSINOPHIL NFR BLD AUTO: 3 % — SIGNIFICANT CHANGE UP (ref 0–6)
GLUCOSE SERPL-MCNC: 109 MG/DL — HIGH (ref 70–99)
HCT VFR BLD CALC: 28.2 % — LOW (ref 34.5–45)
HGB BLD-MCNC: 9 G/DL — LOW (ref 11.5–15.5)
HYPOCHROMIA BLD QL: SLIGHT — SIGNIFICANT CHANGE UP
ISTAT TOTAL BETA HCG, PLASMA: <5 IU/L — SIGNIFICANT CHANGE UP
LG PLATELETS BLD QL AUTO: PRESENT — SIGNIFICANT CHANGE UP
LYMPHOCYTES # BLD AUTO: 21 % — SIGNIFICANT CHANGE UP (ref 13–44)
MACROCYTES BLD QL: SLIGHT — SIGNIFICANT CHANGE UP
MANUAL SMEAR VERIFICATION: SIGNIFICANT CHANGE UP
MCHC RBC-ENTMCNC: 31.4 PG — SIGNIFICANT CHANGE UP (ref 27–34)
MCHC RBC-ENTMCNC: 31.9 GM/DL — LOW (ref 32–36)
MCV RBC AUTO: 98.3 FL — SIGNIFICANT CHANGE UP (ref 80–100)
MICROCYTES BLD QL: SLIGHT — SIGNIFICANT CHANGE UP
MONOCYTES NFR BLD AUTO: 1 % — LOW (ref 2–14)
NEUTROPHILS # BLD AUTO: 2.1 K/UL — SIGNIFICANT CHANGE UP (ref 1.8–7.4)
NEUTROPHILS NFR BLD AUTO: 73 % — SIGNIFICANT CHANGE UP (ref 43–77)
OVALOCYTES BLD QL SMEAR: SLIGHT — SIGNIFICANT CHANGE UP
PLAT MORPH BLD: ABNORMAL
PLATELET # BLD AUTO: 139 K/UL — LOW (ref 150–400)
POIKILOCYTOSIS BLD QL AUTO: SLIGHT — SIGNIFICANT CHANGE UP
POTASSIUM SERPL-MCNC: 4.1 MMOL/L — SIGNIFICANT CHANGE UP (ref 3.5–5.3)
POTASSIUM SERPL-SCNC: 4.1 MMOL/L — SIGNIFICANT CHANGE UP (ref 3.5–5.3)
PROT SERPL-MCNC: 7 G/DL — SIGNIFICANT CHANGE UP (ref 6–8.3)
RBC # BLD: 2.87 M/UL — LOW (ref 3.8–5.2)
RBC # FLD: 17.4 % — HIGH (ref 10.3–14.5)
RBC BLD AUTO: ABNORMAL
SODIUM SERPL-SCNC: 141 MMOL/L — SIGNIFICANT CHANGE UP (ref 135–145)
SPHEROCYTES BLD QL SMEAR: SLIGHT — SIGNIFICANT CHANGE UP
WBC # BLD: 2.8 K/UL — LOW (ref 3.8–10.5)
WBC # FLD AUTO: 2.8 K/UL — LOW (ref 3.8–10.5)

## 2024-07-03 RX ORDER — SODIUM CHLORIDE 0.9 % (FLUSH) 0.9 %
10 SYRINGE (ML) INJECTION ONCE
Refills: 0 | Status: COMPLETED | OUTPATIENT
Start: 2024-07-03 | End: 2024-07-03

## 2024-07-03 RX ORDER — PACLITAXEL 30 MG/5ML
128 INJECTION, SOLUTION INTRAVENOUS ONCE
Refills: 0 | Status: COMPLETED | OUTPATIENT
Start: 2024-07-03 | End: 2024-07-03

## 2024-07-03 RX ORDER — DIPHENHYDRAMINE HCL 12.5MG/5ML
25 ELIXIR ORAL ONCE
Refills: 0 | Status: COMPLETED | OUTPATIENT
Start: 2024-07-03 | End: 2024-07-03

## 2024-07-03 RX ORDER — LIDOCAINE HCL 28 MG/G
1 GEL TOPICAL ONCE
Refills: 0 | Status: COMPLETED | OUTPATIENT
Start: 2024-07-03 | End: 2024-07-03

## 2024-07-03 RX ORDER — CARBOPLATIN 10 MG/ML
131 INJECTION INTRAVENOUS ONCE
Refills: 0 | Status: COMPLETED | OUTPATIENT
Start: 2024-07-03 | End: 2024-07-03

## 2024-07-03 RX ORDER — FAMOTIDINE 40 MG
20 TABLET ORAL ONCE
Refills: 0 | Status: COMPLETED | OUTPATIENT
Start: 2024-07-03 | End: 2024-07-03

## 2024-07-03 RX ORDER — DEXAMETHASONE 1 MG/1
10 TABLET ORAL ONCE
Refills: 0 | Status: COMPLETED | OUTPATIENT
Start: 2024-07-03 | End: 2024-07-03

## 2024-07-03 RX ORDER — PALONOSETRON HYDROCHLORIDE 0.25 MG/5ML
0.25 INJECTION, SOLUTION INTRAVENOUS ONCE
Refills: 0 | Status: COMPLETED | OUTPATIENT
Start: 2024-07-03 | End: 2024-07-03

## 2024-07-03 RX ADMIN — CARBOPLATIN 131 MILLIGRAM(S): 10 INJECTION INTRAVENOUS at 18:20

## 2024-07-03 RX ADMIN — DEXAMETHASONE 204 MILLIGRAM(S): 1 TABLET ORAL at 16:20

## 2024-07-03 RX ADMIN — DEXAMETHASONE 10 MILLIGRAM(S): 1 TABLET ORAL at 16:35

## 2024-07-03 RX ADMIN — PACLITAXEL 128 MILLIGRAM(S): 30 INJECTION, SOLUTION INTRAVENOUS at 17:07

## 2024-07-03 RX ADMIN — PACLITAXEL 128 MILLIGRAM(S): 30 INJECTION, SOLUTION INTRAVENOUS at 18:10

## 2024-07-03 RX ADMIN — PALONOSETRON HYDROCHLORIDE 0.25 MILLIGRAM(S): 0.25 INJECTION, SOLUTION INTRAVENOUS at 16:55

## 2024-07-03 RX ADMIN — Medication 10 MILLILITER(S): at 18:55

## 2024-07-03 RX ADMIN — CARBOPLATIN 131 MILLIGRAM(S): 10 INJECTION INTRAVENOUS at 18:50

## 2024-07-03 RX ADMIN — Medication 20 MILLIGRAM(S): at 16:50

## 2024-07-03 RX ADMIN — Medication 202 MILLIGRAM(S): at 16:35

## 2024-07-03 RX ADMIN — Medication 25 MILLIGRAM(S): at 16:50

## 2024-07-08 ENCOUNTER — NON-APPOINTMENT (OUTPATIENT)
Age: 54
End: 2024-07-08

## 2024-07-10 ENCOUNTER — OUTPATIENT (OUTPATIENT)
Dept: OUTPATIENT SERVICES | Facility: HOSPITAL | Age: 54
LOS: 1 days | End: 2024-07-10
Payer: COMMERCIAL

## 2024-07-10 ENCOUNTER — NON-APPOINTMENT (OUTPATIENT)
Age: 54
End: 2024-07-10

## 2024-07-10 ENCOUNTER — LABORATORY RESULT (OUTPATIENT)
Age: 54
End: 2024-07-10

## 2024-07-10 ENCOUNTER — APPOINTMENT (OUTPATIENT)
Dept: HEMATOLOGY ONCOLOGY | Facility: CLINIC | Age: 54
End: 2024-07-10
Payer: COMMERCIAL

## 2024-07-10 ENCOUNTER — APPOINTMENT (OUTPATIENT)
Dept: INFUSION THERAPY | Facility: CLINIC | Age: 54
End: 2024-07-10

## 2024-07-10 VITALS
HEART RATE: 99 BPM | OXYGEN SATURATION: 100 % | HEIGHT: 64 IN | RESPIRATION RATE: 18 BRPM | WEIGHT: 123.02 LBS | DIASTOLIC BLOOD PRESSURE: 78 MMHG | SYSTOLIC BLOOD PRESSURE: 110 MMHG | TEMPERATURE: 98 F

## 2024-07-10 VITALS
HEART RATE: 99 BPM | BODY MASS INDEX: 21 KG/M2 | SYSTOLIC BLOOD PRESSURE: 110 MMHG | OXYGEN SATURATION: 100 % | WEIGHT: 123 LBS | DIASTOLIC BLOOD PRESSURE: 78 MMHG | HEIGHT: 64 IN | RESPIRATION RATE: 18 BRPM | TEMPERATURE: 98.3 F

## 2024-07-10 DIAGNOSIS — L65.9 NONSCARRING HAIR LOSS, UNSPECIFIED: ICD-10-CM

## 2024-07-10 DIAGNOSIS — C50.919 MALIGNANT NEOPLASM OF UNSPECIFIED SITE OF UNSPECIFIED FEMALE BREAST: ICD-10-CM

## 2024-07-10 DIAGNOSIS — T45.1X5D ADVERSE EFFECT OF ANTINEOPLASTIC AND IMMUNOSUPPRESSIVE DRUGS, SUBSEQUENT ENCOUNTER: ICD-10-CM

## 2024-07-10 DIAGNOSIS — K52.1 TOXIC GASTROENTERITIS AND COLITIS: ICD-10-CM

## 2024-07-10 LAB
ALBUMIN SERPL ELPH-MCNC: 3.8 G/DL
ALP BLD-CCNC: 52 U/L
ALT SERPL-CCNC: 26 U/L
AST SERPL-CCNC: 28 U/L
BILIRUB SERPL-MCNC: 0.6 MG/DL
BUN SERPL-MCNC: 12 MG/DL
CALCIUM SERPL-MCNC: 10.1 MG/DL
CHLORIDE SERPL-SCNC: 108 MMOL/L
CO2 SERPL-SCNC: 25 MMOL/L
CREAT SERPL-MCNC: 0.7 MG/DL
EGFR: 103 ML/MIN/1.73M2
GLUCOSE SERPL-MCNC: 117 MG/DL
HCT VFR BLD CALC: 26.4 %
HGB BLD-MCNC: 8.4 G/DL
LYMPHOCYTES # BLD AUTO: 0.66 K/UL
LYMPHOCYTES NFR BLD AUTO: 30 %
MAGNESIUM SERPL-MCNC: 1.6 MG/DL
MAN DIFF?: NORMAL
MCHC RBC-ENTMCNC: 31.2 PG
MCHC RBC-ENTMCNC: 31.8 GM/DL
MCV RBC AUTO: 98.1 FL
MONOCYTES # BLD AUTO: 0.1 K/UL
MONOCYTES NFR BLD AUTO: 8 %
NEUTROPHILS # BLD AUTO: 1.4 K/UL
NEUTROPHILS NFR BLD AUTO: 62 %
PLATELET # BLD AUTO: 127 K/UL
POTASSIUM SERPL-SCNC: 3.8 MMOL/L
RBC # BLD: 2.69 M/UL
RBC # FLD: 16.7 %
SODIUM SERPL-SCNC: 139 MMOL/L
WBC # FLD AUTO: 2.2 K/UL

## 2024-07-10 PROCEDURE — 96417 CHEMO IV INFUS EACH ADDL SEQ: CPT

## 2024-07-10 PROCEDURE — 85025 COMPLETE CBC W/AUTO DIFF WBC: CPT

## 2024-07-10 PROCEDURE — 96413 CHEMO IV INFUSION 1 HR: CPT

## 2024-07-10 PROCEDURE — 84702 CHORIONIC GONADOTROPIN TEST: CPT

## 2024-07-10 PROCEDURE — G2211 COMPLEX E/M VISIT ADD ON: CPT | Mod: NC

## 2024-07-10 PROCEDURE — 99214 OFFICE O/P EST MOD 30 MIN: CPT

## 2024-07-10 PROCEDURE — 80053 COMPREHEN METABOLIC PANEL: CPT

## 2024-07-10 PROCEDURE — 85007 BL SMEAR W/DIFF WBC COUNT: CPT

## 2024-07-10 PROCEDURE — 36415 COLL VENOUS BLD VENIPUNCTURE: CPT

## 2024-07-10 PROCEDURE — 96375 TX/PRO/DX INJ NEW DRUG ADDON: CPT

## 2024-07-10 PROCEDURE — 96367 TX/PROPH/DG ADDL SEQ IV INF: CPT

## 2024-07-10 RX ORDER — DIPHENHYDRAMINE HCL 12.5MG/5ML
25 ELIXIR ORAL ONCE
Refills: 0 | Status: COMPLETED | OUTPATIENT
Start: 2024-07-10 | End: 2024-07-10

## 2024-07-10 RX ORDER — LIDOCAINE HCL 28 MG/G
1 GEL TOPICAL ONCE
Refills: 0 | Status: COMPLETED | OUTPATIENT
Start: 2024-07-10 | End: 2024-07-10

## 2024-07-10 RX ORDER — DEXAMETHASONE 1 MG/1
10 TABLET ORAL ONCE
Refills: 0 | Status: COMPLETED | OUTPATIENT
Start: 2024-07-10 | End: 2024-07-10

## 2024-07-10 RX ORDER — SODIUM CHLORIDE 0.9 % (FLUSH) 0.9 %
10 SYRINGE (ML) INJECTION ONCE
Refills: 0 | Status: COMPLETED | OUTPATIENT
Start: 2024-07-10 | End: 2024-07-10

## 2024-07-10 RX ORDER — FAMOTIDINE 40 MG
20 TABLET ORAL ONCE
Refills: 0 | Status: COMPLETED | OUTPATIENT
Start: 2024-07-10 | End: 2024-07-10

## 2024-07-10 RX ORDER — CARBOPLATIN 10 MG/ML
131 INJECTION INTRAVENOUS ONCE
Refills: 0 | Status: COMPLETED | OUTPATIENT
Start: 2024-07-10 | End: 2024-07-10

## 2024-07-10 RX ORDER — FILGRASTIM 300 UG/.5ML
300 INJECTION, SOLUTION INTRAVENOUS; SUBCUTANEOUS DAILY
Qty: 10 | Refills: 0 | Status: DISCONTINUED | COMMUNITY
Start: 2024-07-10 | End: 2024-07-10

## 2024-07-10 RX ORDER — ACETAMINOPHEN 325 MG
650 TABLET ORAL ONCE
Refills: 0 | Status: COMPLETED | OUTPATIENT
Start: 2024-07-10 | End: 2024-07-10

## 2024-07-10 RX ORDER — PACLITAXEL 30 MG/5ML
128 INJECTION, SOLUTION INTRAVENOUS ONCE
Refills: 0 | Status: COMPLETED | OUTPATIENT
Start: 2024-07-10 | End: 2024-07-10

## 2024-07-10 RX ORDER — PALONOSETRON HYDROCHLORIDE 0.25 MG/5ML
0.25 INJECTION, SOLUTION INTRAVENOUS ONCE
Refills: 0 | Status: COMPLETED | OUTPATIENT
Start: 2024-07-10 | End: 2024-07-10

## 2024-07-10 RX ADMIN — Medication 650 MILLIGRAM(S): at 15:02

## 2024-07-10 RX ADMIN — Medication 10 MILLILITER(S): at 17:30

## 2024-07-10 RX ADMIN — PACLITAXEL 128 MILLIGRAM(S): 30 INJECTION, SOLUTION INTRAVENOUS at 15:49

## 2024-07-10 RX ADMIN — Medication 650 MILLIGRAM(S): at 15:05

## 2024-07-10 RX ADMIN — PALONOSETRON HYDROCHLORIDE 0.25 MILLIGRAM(S): 0.25 INJECTION, SOLUTION INTRAVENOUS at 15:05

## 2024-07-10 RX ADMIN — Medication 20 MILLIGRAM(S): at 15:06

## 2024-07-10 RX ADMIN — Medication 25 MILLIGRAM(S): at 15:39

## 2024-07-10 RX ADMIN — CARBOPLATIN 131 MILLIGRAM(S): 10 INJECTION INTRAVENOUS at 16:54

## 2024-07-10 RX ADMIN — DEXAMETHASONE 10 MILLIGRAM(S): 1 TABLET ORAL at 15:24

## 2024-07-10 RX ADMIN — Medication 650 MILLIGRAM(S): at 15:17

## 2024-07-10 RX ADMIN — DEXAMETHASONE 204 MILLIGRAM(S): 1 TABLET ORAL at 15:09

## 2024-07-10 RX ADMIN — PACLITAXEL 128 MILLIGRAM(S): 30 INJECTION, SOLUTION INTRAVENOUS at 16:54

## 2024-07-10 RX ADMIN — CARBOPLATIN 131 MILLIGRAM(S): 10 INJECTION INTRAVENOUS at 17:24

## 2024-07-10 RX ADMIN — Medication 202 MILLIGRAM(S): at 15:09

## 2024-07-10 RX ADMIN — Medication 650 MILLIGRAM(S): at 15:09

## 2024-07-12 ENCOUNTER — APPOINTMENT (OUTPATIENT)
Dept: INFUSION THERAPY | Facility: CLINIC | Age: 54
End: 2024-07-12

## 2024-07-12 ENCOUNTER — OUTPATIENT (OUTPATIENT)
Dept: OUTPATIENT SERVICES | Facility: HOSPITAL | Age: 54
LOS: 1 days | End: 2024-07-12
Payer: COMMERCIAL

## 2024-07-12 VITALS
SYSTOLIC BLOOD PRESSURE: 123 MMHG | OXYGEN SATURATION: 99 % | TEMPERATURE: 98 F | WEIGHT: 126.1 LBS | HEIGHT: 64 IN | HEART RATE: 93 BPM | DIASTOLIC BLOOD PRESSURE: 79 MMHG | RESPIRATION RATE: 18 BRPM

## 2024-07-12 DIAGNOSIS — C50.919 MALIGNANT NEOPLASM OF UNSPECIFIED SITE OF UNSPECIFIED FEMALE BREAST: ICD-10-CM

## 2024-07-12 PROCEDURE — 96372 THER/PROPH/DIAG INJ SC/IM: CPT

## 2024-07-12 PROCEDURE — 96413 CHEMO IV INFUSION 1 HR: CPT

## 2024-07-12 PROCEDURE — 96417 CHEMO IV INFUS EACH ADDL SEQ: CPT

## 2024-07-12 RX ORDER — FILGRASTIM 480 UG/1.6ML
300 INJECTION, SOLUTION INTRAVENOUS; SUBCUTANEOUS ONCE
Refills: 0 | Status: COMPLETED | OUTPATIENT
Start: 2024-07-12 | End: 2024-07-12

## 2024-07-12 RX ADMIN — FILGRASTIM 300 MICROGRAM(S): 480 INJECTION, SOLUTION INTRAVENOUS; SUBCUTANEOUS at 11:08

## 2024-07-15 ENCOUNTER — NON-APPOINTMENT (OUTPATIENT)
Age: 54
End: 2024-07-15

## 2024-07-15 DIAGNOSIS — C77.9 MALIGNANT NEOPLASM OF UNSPECIFIED SITE OF UNSPECIFIED FEMALE BREAST: ICD-10-CM

## 2024-07-15 DIAGNOSIS — C50.919 MALIGNANT NEOPLASM OF UNSPECIFIED SITE OF UNSPECIFIED FEMALE BREAST: ICD-10-CM

## 2024-07-16 RX ORDER — SODIUM CHLORIDE 0.9 % (FLUSH) 0.9 %
10 SYRINGE (ML) INJECTION ONCE
Refills: 0 | Status: DISCONTINUED | OUTPATIENT
Start: 2024-07-17 | End: 2024-07-31

## 2024-07-17 ENCOUNTER — OUTPATIENT (OUTPATIENT)
Dept: OUTPATIENT SERVICES | Facility: HOSPITAL | Age: 54
LOS: 1 days | End: 2024-07-17
Payer: COMMERCIAL

## 2024-07-17 ENCOUNTER — APPOINTMENT (OUTPATIENT)
Dept: INFUSION THERAPY | Facility: CLINIC | Age: 54
End: 2024-07-17

## 2024-07-17 VITALS
HEIGHT: 64 IN | TEMPERATURE: 98 F | SYSTOLIC BLOOD PRESSURE: 122 MMHG | RESPIRATION RATE: 18 BRPM | DIASTOLIC BLOOD PRESSURE: 69 MMHG | WEIGHT: 126.1 LBS | OXYGEN SATURATION: 100 % | HEART RATE: 99 BPM

## 2024-07-17 VITALS
HEART RATE: 81 BPM | OXYGEN SATURATION: 100 % | DIASTOLIC BLOOD PRESSURE: 69 MMHG | SYSTOLIC BLOOD PRESSURE: 112 MMHG | TEMPERATURE: 98 F | RESPIRATION RATE: 16 BRPM

## 2024-07-17 DIAGNOSIS — C50.919 MALIGNANT NEOPLASM OF UNSPECIFIED SITE OF UNSPECIFIED FEMALE BREAST: ICD-10-CM

## 2024-07-17 LAB
ALBUMIN SERPL ELPH-MCNC: 3.9 G/DL — SIGNIFICANT CHANGE UP (ref 3.3–5)
ALP SERPL-CCNC: 55 U/L — SIGNIFICANT CHANGE UP (ref 40–120)
ALT FLD-CCNC: 28 U/L — SIGNIFICANT CHANGE UP (ref 10–45)
ANION GAP SERPL CALC-SCNC: 6 MMOL/L — SIGNIFICANT CHANGE UP (ref 5–17)
ANISOCYTOSIS BLD QL: SLIGHT — SIGNIFICANT CHANGE UP
AST SERPL-CCNC: 30 U/L — SIGNIFICANT CHANGE UP (ref 10–40)
BILIRUB SERPL-MCNC: 0.6 MG/DL — SIGNIFICANT CHANGE UP (ref 0.2–1.2)
BUN SERPL-MCNC: 11 MG/DL — SIGNIFICANT CHANGE UP (ref 7–23)
CALCIUM SERPL-MCNC: 10.2 MG/DL — SIGNIFICANT CHANGE UP (ref 8.4–10.5)
CHLORIDE SERPL-SCNC: 106 MMOL/L — SIGNIFICANT CHANGE UP (ref 96–108)
CO2 SERPL-SCNC: 29 MMOL/L — SIGNIFICANT CHANGE UP (ref 22–31)
CREAT SERPL-MCNC: 0.9 MG/DL — SIGNIFICANT CHANGE UP (ref 0.5–1.3)
DACRYOCYTES BLD QL SMEAR: SLIGHT — SIGNIFICANT CHANGE UP
EGFR: 76 ML/MIN/1.73M2 — SIGNIFICANT CHANGE UP
EOSINOPHIL # BLD AUTO: 0.1 K/UL — SIGNIFICANT CHANGE UP (ref 0–0.5)
EOSINOPHIL NFR BLD AUTO: 1 % — SIGNIFICANT CHANGE UP (ref 0–6)
GLUCOSE SERPL-MCNC: 118 MG/DL — HIGH (ref 70–99)
HCT VFR BLD CALC: 28 % — LOW (ref 34.5–45)
HGB BLD-MCNC: 8.9 G/DL — LOW (ref 11.5–15.5)
ISTAT TOTAL BETA HCG, PLASMA: <5 IU/L — SIGNIFICANT CHANGE UP
LYMPHOCYTES # BLD AUTO: 0.7 K/UL — LOW (ref 1–3.3)
LYMPHOCYTES # BLD AUTO: 33 % — SIGNIFICANT CHANGE UP (ref 13–44)
MACROCYTES BLD QL: SLIGHT — SIGNIFICANT CHANGE UP
MANUAL SMEAR VERIFICATION: SIGNIFICANT CHANGE UP
MCHC RBC-ENTMCNC: 31.6 PG — SIGNIFICANT CHANGE UP (ref 27–34)
MCHC RBC-ENTMCNC: 31.8 GM/DL — LOW (ref 32–36)
MCV RBC AUTO: 99.3 FL — SIGNIFICANT CHANGE UP (ref 80–100)
MICROCYTES BLD QL: SLIGHT — SIGNIFICANT CHANGE UP
MONOCYTES # BLD AUTO: 0.3 K/UL — SIGNIFICANT CHANGE UP (ref 0–0.9)
MONOCYTES NFR BLD AUTO: 13 % — SIGNIFICANT CHANGE UP (ref 2–14)
NEUTROPHILS # BLD AUTO: 1.1 K/UL — LOW (ref 1.8–7.4)
NEUTROPHILS NFR BLD AUTO: 53 % — SIGNIFICANT CHANGE UP (ref 43–77)
OVALOCYTES BLD QL SMEAR: SLIGHT — SIGNIFICANT CHANGE UP
PLAT MORPH BLD: ABNORMAL
PLATELET # BLD AUTO: 122 K/UL — LOW (ref 150–400)
POIKILOCYTOSIS BLD QL AUTO: SLIGHT — SIGNIFICANT CHANGE UP
POTASSIUM SERPL-MCNC: 4.5 MMOL/L — SIGNIFICANT CHANGE UP (ref 3.5–5.3)
POTASSIUM SERPL-SCNC: 4.5 MMOL/L — SIGNIFICANT CHANGE UP (ref 3.5–5.3)
PROT SERPL-MCNC: 7 G/DL — SIGNIFICANT CHANGE UP (ref 6–8.3)
RBC # BLD: 2.82 M/UL — LOW (ref 3.8–5.2)
RBC # FLD: 15.9 % — HIGH (ref 10.3–14.5)
RBC BLD AUTO: ABNORMAL
SODIUM SERPL-SCNC: 141 MMOL/L — SIGNIFICANT CHANGE UP (ref 135–145)
T4 AB SER-ACNC: 7.63 UG/DL — SIGNIFICANT CHANGE UP (ref 4.5–11.7)
TSH SERPL-MCNC: 0.73 UIU/ML — SIGNIFICANT CHANGE UP (ref 0.27–4.2)
WBC # BLD: 2 K/UL — LOW (ref 3.8–10.5)
WBC # FLD AUTO: 2 K/UL — LOW (ref 3.8–10.5)

## 2024-07-17 PROCEDURE — 80053 COMPREHEN METABOLIC PANEL: CPT

## 2024-07-17 PROCEDURE — 85025 COMPLETE CBC W/AUTO DIFF WBC: CPT

## 2024-07-17 PROCEDURE — 84436 ASSAY OF TOTAL THYROXINE: CPT

## 2024-07-17 PROCEDURE — 96413 CHEMO IV INFUSION 1 HR: CPT

## 2024-07-17 PROCEDURE — 84702 CHORIONIC GONADOTROPIN TEST: CPT

## 2024-07-17 PROCEDURE — 84443 ASSAY THYROID STIM HORMONE: CPT

## 2024-07-17 PROCEDURE — 96375 TX/PRO/DX INJ NEW DRUG ADDON: CPT

## 2024-07-17 PROCEDURE — 36415 COLL VENOUS BLD VENIPUNCTURE: CPT

## 2024-07-17 PROCEDURE — 96417 CHEMO IV INFUS EACH ADDL SEQ: CPT

## 2024-07-17 PROCEDURE — 85007 BL SMEAR W/DIFF WBC COUNT: CPT

## 2024-07-17 RX ORDER — PALONOSETRON HYDROCHLORIDE 0.25 MG/5ML
0.25 INJECTION, SOLUTION INTRAVENOUS ONCE
Refills: 0 | Status: COMPLETED | OUTPATIENT
Start: 2024-07-17 | End: 2024-07-17

## 2024-07-17 RX ORDER — PACLITAXEL 30 MG/5ML
128 INJECTION, SOLUTION INTRAVENOUS ONCE
Refills: 0 | Status: COMPLETED | OUTPATIENT
Start: 2024-07-17 | End: 2024-07-17

## 2024-07-17 RX ORDER — FAMOTIDINE 40 MG
20 TABLET ORAL ONCE
Refills: 0 | Status: COMPLETED | OUTPATIENT
Start: 2024-07-17 | End: 2024-07-17

## 2024-07-17 RX ORDER — DEXAMETHASONE 1 MG/1
10 TABLET ORAL ONCE
Refills: 0 | Status: COMPLETED | OUTPATIENT
Start: 2024-07-17 | End: 2024-07-17

## 2024-07-17 RX ORDER — LIDOCAINE HCL 28 MG/G
1 GEL TOPICAL ONCE
Refills: 0 | Status: COMPLETED | OUTPATIENT
Start: 2024-07-17 | End: 2024-07-17

## 2024-07-17 RX ORDER — ACETAMINOPHEN 325 MG
650 TABLET ORAL ONCE
Refills: 0 | Status: COMPLETED | OUTPATIENT
Start: 2024-07-17 | End: 2024-07-17

## 2024-07-17 RX ORDER — CARBOPLATIN 10 MG/ML
131 INJECTION INTRAVENOUS ONCE
Refills: 0 | Status: COMPLETED | OUTPATIENT
Start: 2024-07-17 | End: 2024-07-17

## 2024-07-17 RX ORDER — DIPHENHYDRAMINE HCL 12.5MG/5ML
25 ELIXIR ORAL ONCE
Refills: 0 | Status: COMPLETED | OUTPATIENT
Start: 2024-07-17 | End: 2024-07-17

## 2024-07-17 RX ADMIN — Medication 650 MILLIGRAM(S): at 14:00

## 2024-07-17 RX ADMIN — Medication 202 MILLIGRAM(S): at 14:15

## 2024-07-17 RX ADMIN — PACLITAXEL 128 MILLIGRAM(S): 30 INJECTION, SOLUTION INTRAVENOUS at 15:31

## 2024-07-17 RX ADMIN — CARBOPLATIN 131 MILLIGRAM(S): 10 INJECTION INTRAVENOUS at 15:31

## 2024-07-17 RX ADMIN — PACLITAXEL 128 MILLIGRAM(S): 30 INJECTION, SOLUTION INTRAVENOUS at 14:31

## 2024-07-17 RX ADMIN — Medication 650 MILLIGRAM(S): at 13:54

## 2024-07-17 RX ADMIN — CARBOPLATIN 131 MILLIGRAM(S): 10 INJECTION INTRAVENOUS at 16:01

## 2024-07-17 RX ADMIN — PALONOSETRON HYDROCHLORIDE 0.25 MILLIGRAM(S): 0.25 INJECTION, SOLUTION INTRAVENOUS at 13:52

## 2024-07-17 RX ADMIN — DEXAMETHASONE 10 MILLIGRAM(S): 1 TABLET ORAL at 14:15

## 2024-07-17 RX ADMIN — LIDOCAINE HCL 1 APPLICATION(S): 28 GEL TOPICAL at 13:30

## 2024-07-17 RX ADMIN — DEXAMETHASONE 204 MILLIGRAM(S): 1 TABLET ORAL at 14:00

## 2024-07-17 RX ADMIN — Medication 20 MILLIGRAM(S): at 13:53

## 2024-07-17 RX ADMIN — Medication 25 MILLIGRAM(S): at 14:30

## 2024-07-19 ENCOUNTER — OUTPATIENT (OUTPATIENT)
Dept: OUTPATIENT SERVICES | Facility: HOSPITAL | Age: 54
LOS: 1 days | End: 2024-07-19
Payer: COMMERCIAL

## 2024-07-19 ENCOUNTER — APPOINTMENT (OUTPATIENT)
Dept: INFUSION THERAPY | Facility: CLINIC | Age: 54
End: 2024-07-19

## 2024-07-19 VITALS
HEIGHT: 64 IN | SYSTOLIC BLOOD PRESSURE: 113 MMHG | DIASTOLIC BLOOD PRESSURE: 76 MMHG | OXYGEN SATURATION: 98 % | HEART RATE: 91 BPM | TEMPERATURE: 98 F | WEIGHT: 126.1 LBS | RESPIRATION RATE: 18 BRPM

## 2024-07-19 PROCEDURE — 96372 THER/PROPH/DIAG INJ SC/IM: CPT

## 2024-07-19 RX ORDER — FILGRASTIM 480 UG/1.6ML
300 INJECTION, SOLUTION INTRAVENOUS; SUBCUTANEOUS ONCE
Refills: 0 | Status: COMPLETED | OUTPATIENT
Start: 2024-07-19 | End: 2024-07-19

## 2024-07-19 RX ADMIN — FILGRASTIM 300 MICROGRAM(S): 480 INJECTION, SOLUTION INTRAVENOUS; SUBCUTANEOUS at 11:46

## 2024-07-24 ENCOUNTER — APPOINTMENT (OUTPATIENT)
Dept: INFUSION THERAPY | Facility: CLINIC | Age: 54
End: 2024-07-24

## 2024-07-26 ENCOUNTER — APPOINTMENT (OUTPATIENT)
Dept: INFUSION THERAPY | Facility: CLINIC | Age: 54
End: 2024-07-26

## 2024-07-30 RX ORDER — BACTERIOSTATIC SODIUM CHLORIDE 0.9 %
10 VIAL (ML) INJECTION ONCE
Refills: 0 | Status: COMPLETED | OUTPATIENT
Start: 2024-07-31 | End: 2024-07-31

## 2024-07-31 ENCOUNTER — APPOINTMENT (OUTPATIENT)
Dept: HEMATOLOGY ONCOLOGY | Facility: CLINIC | Age: 54
End: 2024-07-31
Payer: COMMERCIAL

## 2024-07-31 ENCOUNTER — NON-APPOINTMENT (OUTPATIENT)
Age: 54
End: 2024-07-31

## 2024-07-31 ENCOUNTER — OUTPATIENT (OUTPATIENT)
Dept: OUTPATIENT SERVICES | Facility: HOSPITAL | Age: 54
LOS: 1 days | End: 2024-07-31
Payer: COMMERCIAL

## 2024-07-31 ENCOUNTER — APPOINTMENT (OUTPATIENT)
Dept: INFUSION THERAPY | Facility: CLINIC | Age: 54
End: 2024-07-31

## 2024-07-31 VITALS
SYSTOLIC BLOOD PRESSURE: 126 MMHG | HEART RATE: 98 BPM | RESPIRATION RATE: 18 BRPM | TEMPERATURE: 98 F | OXYGEN SATURATION: 99 % | DIASTOLIC BLOOD PRESSURE: 84 MMHG | HEIGHT: 64 IN | WEIGHT: 125 LBS

## 2024-07-31 VITALS
SYSTOLIC BLOOD PRESSURE: 126 MMHG | OXYGEN SATURATION: 100 % | WEIGHT: 125 LBS | HEART RATE: 98 BPM | TEMPERATURE: 98.3 F | RESPIRATION RATE: 18 BRPM | BODY MASS INDEX: 21.34 KG/M2 | HEIGHT: 64 IN | DIASTOLIC BLOOD PRESSURE: 84 MMHG

## 2024-07-31 DIAGNOSIS — C50.919 MALIGNANT NEOPLASM OF UNSPECIFIED SITE OF UNSPECIFIED FEMALE BREAST: ICD-10-CM

## 2024-07-31 DIAGNOSIS — L65.9 NONSCARRING HAIR LOSS, UNSPECIFIED: ICD-10-CM

## 2024-07-31 DIAGNOSIS — Z86.79 PERSONAL HISTORY OF OTHER DISEASES OF THE CIRCULATORY SYSTEM: ICD-10-CM

## 2024-07-31 DIAGNOSIS — C77.9 MALIGNANT NEOPLASM OF UNSPECIFIED SITE OF UNSPECIFIED FEMALE BREAST: ICD-10-CM

## 2024-07-31 DIAGNOSIS — T45.1X5D ADVERSE EFFECT OF ANTINEOPLASTIC AND IMMUNOSUPPRESSIVE DRUGS, SUBSEQUENT ENCOUNTER: ICD-10-CM

## 2024-07-31 DIAGNOSIS — Z86.39 PERSONAL HISTORY OF OTHER ENDOCRINE, NUTRITIONAL AND METABOLIC DISEASE: ICD-10-CM

## 2024-07-31 LAB
ALBUMIN SERPL ELPH-MCNC: 3.9 G/DL
ALP BLD-CCNC: 62 U/L
ALT SERPL-CCNC: 31 U/L
ANION GAP SERPL CALC-SCNC: 6 MMOL/L
AST SERPL-CCNC: 39 U/L
BILIRUB SERPL-MCNC: 0.5 MG/DL
BUN SERPL-MCNC: 11 MG/DL
CALCIUM SERPL-MCNC: 9.8 MG/DL
CHLORIDE SERPL-SCNC: 109 MMOL/L
CO2 SERPL-SCNC: 26 MMOL/L
CORTIS SERPL-MCNC: 15.98 UG/DL
CREAT SERPL-MCNC: 1 MG/DL
EGFR: 67 ML/MIN/1.73M2
ESTIMATED AVERAGE GLUCOSE: 97 MG/DL
GLUCOSE SERPL-MCNC: 128 MG/DL
HBA1C MFR BLD HPLC: 5 %
HCT VFR BLD CALC: 28.4 %
HGB BLD-MCNC: 9.4 G/DL
LYMPHOCYTES # BLD AUTO: 0.8 K/UL
LYMPHOCYTES NFR BLD AUTO: 23.9 %
MAN DIFF?: NO
MCHC RBC-ENTMCNC: 33.1 GM/DL
MCHC RBC-ENTMCNC: 33.1 PG
MCV RBC AUTO: 100 FL
NEUTROPHILS # BLD AUTO: 2 K/UL
NEUTROPHILS NFR BLD AUTO: 62.2 %
PLATELET # BLD AUTO: 184 K/UL
POTASSIUM SERPL-SCNC: 4.4 MMOL/L
PROT SERPL-MCNC: 7.1 G/DL
RBC # BLD: 2.84 M/UL
RBC # FLD: 15.3 %
SODIUM SERPL-SCNC: 141 MMOL/L
TSH SERPL-ACNC: 0.84 UIU/ML
WBC # FLD AUTO: 3.3 K/UL

## 2024-07-31 PROCEDURE — 99214 OFFICE O/P EST MOD 30 MIN: CPT | Mod: 25

## 2024-07-31 PROCEDURE — G2211 COMPLEX E/M VISIT ADD ON: CPT

## 2024-07-31 PROCEDURE — 96413 CHEMO IV INFUSION 1 HR: CPT

## 2024-07-31 PROCEDURE — 96375 TX/PRO/DX INJ NEW DRUG ADDON: CPT

## 2024-07-31 PROCEDURE — 96417 CHEMO IV INFUS EACH ADDL SEQ: CPT

## 2024-07-31 RX ORDER — PALONOSETRON HYDROCHLORIDE 0.25 MG/5ML
0.25 INJECTION, SOLUTION INTRAVENOUS ONCE
Refills: 0 | Status: COMPLETED | OUTPATIENT
Start: 2024-07-31 | End: 2024-07-31

## 2024-07-31 RX ORDER — LIDOCAINE 5% 5 G/100G
1 CREAM TOPICAL ONCE
Refills: 0 | Status: COMPLETED | OUTPATIENT
Start: 2024-07-31 | End: 2024-07-31

## 2024-07-31 RX ORDER — FAMOTIDINE 40 MG/1
20 TABLET, FILM COATED ORAL ONCE
Refills: 0 | Status: COMPLETED | OUTPATIENT
Start: 2024-07-31 | End: 2024-07-31

## 2024-07-31 RX ORDER — DEXAMETHASONE 1.5 MG/1
10 TABLET ORAL ONCE
Refills: 0 | Status: COMPLETED | OUTPATIENT
Start: 2024-07-31 | End: 2024-07-31

## 2024-07-31 RX ORDER — ACETAMINOPHEN 500 MG
650 TABLET ORAL ONCE
Refills: 0 | Status: COMPLETED | OUTPATIENT
Start: 2024-07-31 | End: 2024-07-31

## 2024-07-31 RX ORDER — PACLITAXEL 6 MG/ML
128 INJECTION, SOLUTION INTRAVENOUS ONCE
Refills: 0 | Status: COMPLETED | OUTPATIENT
Start: 2024-07-31 | End: 2024-07-31

## 2024-07-31 RX ORDER — DIPHENHYDRAMINE HCL 25 MG
25 CAPSULE ORAL ONCE
Refills: 0 | Status: COMPLETED | OUTPATIENT
Start: 2024-07-31 | End: 2024-07-31

## 2024-07-31 RX ADMIN — Medication 202 MILLIGRAM(S): at 11:00

## 2024-07-31 RX ADMIN — Medication 25 MILLIGRAM(S): at 11:15

## 2024-07-31 RX ADMIN — Medication 650 MILLIGRAM(S): at 11:23

## 2024-07-31 RX ADMIN — PACLITAXEL 128 MILLIGRAM(S): 6 INJECTION, SOLUTION INTRAVENOUS at 13:20

## 2024-07-31 RX ADMIN — Medication 650 MILLIGRAM(S): at 11:50

## 2024-07-31 RX ADMIN — Medication 131 MILLIGRAM(S): at 13:30

## 2024-07-31 RX ADMIN — Medication 10 MILLILITER(S): at 14:30

## 2024-07-31 RX ADMIN — Medication 131 MILLIGRAM(S): at 14:10

## 2024-07-31 RX ADMIN — PALONOSETRON HYDROCHLORIDE 0.25 MILLIGRAM(S): 0.25 INJECTION, SOLUTION INTRAVENOUS at 11:25

## 2024-07-31 RX ADMIN — PACLITAXEL 128 MILLIGRAM(S): 6 INJECTION, SOLUTION INTRAVENOUS at 12:09

## 2024-07-31 RX ADMIN — FAMOTIDINE 20 MILLIGRAM(S): 40 TABLET, FILM COATED ORAL at 11:20

## 2024-07-31 RX ADMIN — DEXAMETHASONE 204 MILLIGRAM(S): 1.5 TABLET ORAL at 10:45

## 2024-07-31 RX ADMIN — DEXAMETHASONE 10 MILLIGRAM(S): 1.5 TABLET ORAL at 11:00

## 2024-07-31 NOTE — REVIEW OF SYSTEMS
[Fatigue] : fatigue [Diarrhea: Grade 0] : Diarrhea: Grade 0 [Negative] : Allergic/Immunologic [FreeTextEntry2] : no wt loss

## 2024-07-31 NOTE — PHYSICAL EXAM
[Restricted in physically strenuous activity but ambulatory and able to carry out work of a light or sedentary nature] : Status 1- Restricted in physically strenuous activity but ambulatory and able to carry out work of a light or sedentary nature, e.g., light house work, office work [Thin] : thin [Normal] : normal spine exam without palpable tenderness, no kyphosis or scoliosis [de-identified] : rt breast 1 cm uoq, lt unremarkable no palp LN [de-identified] : alopecia

## 2024-07-31 NOTE — ASSESSMENT
[FreeTextEntry1] : 53 year old perimenopausal woman (LMP 10/2023) with newly diagnosed multifocal triple negative right breast cancer is referred by Dr. Will Trejo to discuss neoadjuvant systemic therapy.  cT3 cN1 Stage IIIC poorly diff IDC ER 0% [place] 0% HER2 renetta 1+ Ki 67 60%   Extensive discussion with patient, her  and son regarding the type of cancer, triple negative, the clinical stage necessitating neoadjuvant chemotherapy together with immunotherapy as per Keynote 522 clinical trial - ddAC with Neulasta [id number] x4 cycles followed by weekly Carboplatin/Taxol x12 concurrently with Pembrolizumab Q6wks during this treatment and followed after surgery as well every 3 wks x9 cycles. Mentioned in case of lack of pCR, capecitabine to be recommended. Also will need to see rad onc post op.   Reviewed potential side effects of chemotherapy and immunotherapy providing patient with patient information on each of the drugs above. Reviewed that chemotherapy can cause fatigue, hair loss, allergic/infusion reactions, skin changes, poor appetite, raise risk of infection by causing bone marrow suppression with subsequent low blood counts, potentially needing blood transfusions, also growth factor support to shorten duration of neutropenia, also nausea/vomiting, loose stools, musculoskeletal pain, peripheral neuropathy, infertility and teratogenic effects, as well as small risk of cardiomyopathy/heart failure and small risk of secondary malignancies. Also reviewed side effects that can arise from immunotherapy including but not limited to inflammatory conditions involving different organs, leading to hypothyroidism in case the thyroid is affected by treatment, colitis in case the colon is involved, pericarditis, pneumonitis etc.   Visit 4/3/24: C1 ddAC and pembro ECHO 3/27/24 reviewed: EF 60-65% PET 3/27/24: showed enhancing lesion in R breast c/w her newly diagnosed breast cancer in addition to R axillary LAD. Focal hypermetabolic activity also noted in L colon which warrants GI evaluation at a later date. Patient reminded of need for colonoscopy. (she never had one) Olanzapine, prochlorperazine and lidocaine cream prescriptions sent to her pharmacy. Dietician will see her chairside to address her nutrition questions. She has been cleared for treatment using labs from 3/18/24  Visit 4/17/24: Patient with response to treatment after the first cycle of AC with Neulasta.  Tolerating treatment well with fatigue but otherwise no problems.  To proceed with C2 ddAC with Neulasta Onpro. Olanzapine starting tonight, instructed to take once nightly x4 days total.  Compazine prn nausea, olanzapine 1tab/day for four days starting day of chemo. Instructed to call right away or go to ER in case of fever/temp of 100.4F or higher.   Visit 5/1/24: Labs reviewed and patient cleared for C3 ddAC with Neulasta Onpro. Reports minimal nausea and nail discoloration, otherwise no other reported AEs. RTC 5/15 for follow up and C4  5/15/24 Labs reviewed and patient cleared for C4 ddAC with Neulasta Onpro. She is so far tolerating the treatment well.  Reviewed side effects of weekly Paclitaxel, Carboplatin, and additional questions regarding the next phase of the treatment were answered. Pembrolizumab to continue [id number]. Reassurance provided regarding concerns about dietary restrictions. Consent obtained.  6/12/24 To proceed with 3rd weekly carbo/taxol today.  Labs within parameter. Pembro to continue Q6wks as well.  Ech brandon EF Reviewed side effects of weekly Paclitaxel, Carboplatin, and additional questions regarding the next phase of the treatment were answered. Pembrolizumab to continue [id number]. 6/19 chemo 6/2024  ER  6/26 Reviewed events -S/P ACX4 with pembro q 6wks now/ cont pembrowkly with wkly taxol/cbcda 5/12 e discussed stool management - loose now not diarrheal - discussed low fiber diet - nutrition eval.Cont chemo - f/u 2-3 wks 7/10/24 BM better. Reviewed diet  inc kcal intake 7/12. No neuropathy fatigue improved- reviewed chemo SE. dec wbc - xarxio on 7/12 300ug. Monitor for fever  7/31/24 appetite better - low fiber, no dairy- no diarrhea. gained 2 lbs no mucositis, no neuropathy. mild fatigue. LMP 2/24. No SE p xarxio.s/p Rx 9/12 paclitaxel. 10/12 today with pembro  discussed scheduling

## 2024-07-31 NOTE — HISTORY OF PRESENT ILLNESS
[Disease: _____________________] : Disease: [unfilled] [de-identified] : 53 year old female with newly diagnosed multifocal triple negative right breast cancer is referred by Dr. Will Trejo to discuss neoadjuvant systemic therapy. Here today for f/u and tx.   Patient palpated a lump in right breast.  2/21/24 R breast US: New 3 mm hypoechoic mass right 6:00 3 cm from the nipple. New 1 cm hypoechoic mass with indistinct margins right 9:00 4 cm from the nipple . 1.5 x 0.9 x 1.7 cm irregular hypoechoic mass right 10:00 4 cm from the nipple underlying region of palpable mass.  New 1.9 x 0.4 cm irregular hypoechoic mass right 10:00 5 cm from the nipple underlying region of palpable mass. New 1.6 x 1.2 x 1.8 cm irregular hypoechoic mass with cystic component right 10:00 6 cm from the nipple underlying region of palpable mass. Lymph nodes with thickened cortex right axillary region.  3/5/24 b/l dx MG BREAST COMPOSITION:  The breasts are extremely dense, which lowers the sensitivity of mammography.  Spot compression views of the upper outer right breast do not demonstrate any distinct mammographic abnormalities, probably due to the dense nature of the patient's breast. No suspicious masses, architectural distortion, or significant calcifications are detected in the left breast. b/l US: - 6 o'clock axis, 3 cm from the nipple, 0.3 x 0.3 x 0.4 cm ovoid circumscribed hypoechoic mass -9 o'clock axis, 4 cm from the nipple, 1.0 x 0.6 x 0.5 cm ovoid parallel hypoechoic mass with irregular margins. Ultrasound-guided biopsy is recommended. -10 o'clock axis, 4 cm from the nipple, 1.6 x 0.9 x 1.7 cm ovoid parallel hypoechoic mass with irregular margins,  -10 o'clock axis, 4 cm from the nipple, 1.4 x 0.8 x 1.5 cm ovoid parallel hypoechoic mass with irregular margins -10 o'clock axis, 5 cm from the nipple, 2.5 x 0.7 x 1.4 cm irregular hypoechoic mass -10 o'clock axis, 6 cm from the nipple, 2.6 x 1.3 x 1.9 cm irregular hypoechoic mass. -The irregular hypoechoic masses at the 10 o'clock axis, spanning the 4 to 6 cm from the nipple region, appear to be contiguous with one another and can be biopsied simultaneously. -Abnormally thickened right axillary lymph nodes, with the most prominent measuring 1.9 x 0.9 x 1.7 cm. No suspicious solid or complex cystic mass is detected in the left breast. No morphologically abnormal axillary lymph nodes are detected.   3/5/24 US guided biopsy x 3: 1. Right breast 10 o'clock axis, 4-6 cm from the nipple, core biopsy: -Poorly differentiated invasive ductal carcinoma, measuring 0.9 cm in maximal length in this material  ER 0%, GA 0%, HER2 1+, Ki-67 60% -Ductal carcinoma in situ is also present, intermediate to high nuclear grade, solid pattern with extension into lobules. 2. Right breast 9 o'clock axis, 4 cm from the nipple, core biopsy: -Poorly differentiated invasive ductal carcinoma morphologically similar to specimen 1, measuring 0.5 cm in maximal length in this material. ER 0%, GA 0%, HER2 0, Ki-67 44% -Ductal carcinoma in situ is also present, intermediate to high nuclear grade, solid pattern, with extension into lobules. 3. Right axilla, core biopsy: Metastatic carcinoma involving lymph node tissue and morphologically similar to specimen 1 and 2  3/26/24: MRI B/L Breast: Biopsy-proven right breast malignancy measuring at least 5.5 cm. Possible satellite upper central right breast, 2 closed the chest wall to biopsy. Recommend surgical/oncologic managment. Metastatic lymph node in the right axilla. No evidence of left breast malignancy.   [de-identified] : poorly differentiated invasive ductal carcinoma [de-identified] : ER-, [place]-, HER2 0-1+, Ki-67 44-60% [de-identified] : Here to continue treatment with weekly carbo/taxol and pembro Q6wks.  Reports a dry cough associated with nasal congestion/sneezing, especially when outside, but also a cough which patient thinks is worse on lying flat.   Denies fever/chills, sob/dobbs, sore throat, chest pain, diarrhea, vomiting, neuropathy, rash.  6/20 Pt called with complaints of frequent loose stools up to 6x/day and fever of up to 101F today. Last chemo treatment 6/19/24. Asked pt to go to the ER; pt expressed verbal understanding and agreement with plan to rule out infection, address infection if present and receive IV hydration as well in ER. Stated will go to [facility] ED today IVF and discharged. Taking immodium 3-4/d  6/26/24 bm stool still loose. 2 lb wt loss, on medical leave , c/o fatigue, no cough , CP or SOB, hot flushes. LMP 2/23/24, occ light headed with change position, no paresthesias, alopecia, no n/v   7/10/24 no diarrhea - changed diet no dairy , low fiber. Trying to inc intake KCal No fever, rash, or recent illness.  No joint pain/swelling/stiffness.  No eye pain/redness/change in vision.  No sores in the mouth or nose.  No difficulty swallowing.  No chest pain or shortness of breath.  No abdominal complaints or weight loss.  No weakness.  No headaches or focal neurological deficits.  No urinary changes.  No other new symptoms. Taking probiotic. Fatigue better. Back and Knee pain better. Not taking gabapentin or meloxicam  7/31/24 appetite better - low fiber, no dairy- no diarrhea. gained 2 lbs no mucositis, no neuropathy. mild fatigue. LMP 2/24. No SE p xarxio. after each rx s/p 9/12

## 2024-08-02 ENCOUNTER — APPOINTMENT (OUTPATIENT)
Dept: INFUSION THERAPY | Facility: CLINIC | Age: 54
End: 2024-08-02

## 2024-08-05 ENCOUNTER — NON-APPOINTMENT (OUTPATIENT)
Age: 54
End: 2024-08-05

## 2024-08-07 ENCOUNTER — OUTPATIENT (OUTPATIENT)
Dept: OUTPATIENT SERVICES | Facility: HOSPITAL | Age: 54
LOS: 1 days | End: 2024-08-07
Payer: COMMERCIAL

## 2024-08-07 ENCOUNTER — APPOINTMENT (OUTPATIENT)
Dept: INFUSION THERAPY | Facility: CLINIC | Age: 54
End: 2024-08-07

## 2024-08-07 VITALS
HEART RATE: 84 BPM | RESPIRATION RATE: 18 BRPM | WEIGHT: 125 LBS | TEMPERATURE: 99 F | OXYGEN SATURATION: 99 % | DIASTOLIC BLOOD PRESSURE: 86 MMHG | HEIGHT: 64 IN | SYSTOLIC BLOOD PRESSURE: 128 MMHG

## 2024-08-07 VITALS
SYSTOLIC BLOOD PRESSURE: 112 MMHG | DIASTOLIC BLOOD PRESSURE: 76 MMHG | OXYGEN SATURATION: 100 % | RESPIRATION RATE: 16 BRPM | TEMPERATURE: 98 F | HEART RATE: 89 BPM

## 2024-08-07 DIAGNOSIS — C50.919 MALIGNANT NEOPLASM OF UNSPECIFIED SITE OF UNSPECIFIED FEMALE BREAST: ICD-10-CM

## 2024-08-07 LAB
ALBUMIN SERPL ELPH-MCNC: 3.9 G/DL — SIGNIFICANT CHANGE UP (ref 3.3–5)
ALP SERPL-CCNC: 48 U/L — SIGNIFICANT CHANGE UP (ref 40–120)
ALT FLD-CCNC: 28 U/L — SIGNIFICANT CHANGE UP (ref 10–45)
ANION GAP SERPL CALC-SCNC: 7 MMOL/L — SIGNIFICANT CHANGE UP (ref 5–17)
AST SERPL-CCNC: 31 U/L — SIGNIFICANT CHANGE UP (ref 10–40)
BILIRUB SERPL-MCNC: 0.5 MG/DL — SIGNIFICANT CHANGE UP (ref 0.2–1.2)
BUN SERPL-MCNC: 12 MG/DL — SIGNIFICANT CHANGE UP (ref 7–23)
CALCIUM SERPL-MCNC: 10 MG/DL — SIGNIFICANT CHANGE UP (ref 8.4–10.5)
CHLORIDE SERPL-SCNC: 108 MMOL/L — SIGNIFICANT CHANGE UP (ref 96–108)
CO2 SERPL-SCNC: 27 MMOL/L — SIGNIFICANT CHANGE UP (ref 22–31)
CREAT SERPL-MCNC: 0.6 MG/DL — SIGNIFICANT CHANGE UP (ref 0.5–1.3)
EGFR: 107 ML/MIN/1.73M2 — SIGNIFICANT CHANGE UP
GLUCOSE SERPL-MCNC: 111 MG/DL — HIGH (ref 70–99)
HCT VFR BLD CALC: 28.8 % — LOW (ref 34.5–45)
HGB BLD-MCNC: 9.4 G/DL — LOW (ref 11.5–15.5)
LYMPHOCYTES # BLD AUTO: 0.7 K/UL — LOW (ref 1–3.3)
LYMPHOCYTES # BLD AUTO: 20 % — SIGNIFICANT CHANGE UP (ref 13–44)
MCHC RBC-ENTMCNC: 32.6 GM/DL — SIGNIFICANT CHANGE UP (ref 32–36)
MCHC RBC-ENTMCNC: 32.6 PG — SIGNIFICANT CHANGE UP (ref 27–34)
MCV RBC AUTO: 100 FL — SIGNIFICANT CHANGE UP (ref 80–100)
NEUTROPHILS # BLD AUTO: 2.4 K/UL — SIGNIFICANT CHANGE UP (ref 1.8–7.4)
NEUTROPHILS NFR BLD AUTO: 71.2 % — SIGNIFICANT CHANGE UP (ref 43–77)
PLATELET # BLD AUTO: 191 K/UL — SIGNIFICANT CHANGE UP (ref 150–400)
POTASSIUM SERPL-MCNC: 3.9 MMOL/L — SIGNIFICANT CHANGE UP (ref 3.5–5.3)
POTASSIUM SERPL-SCNC: 3.9 MMOL/L — SIGNIFICANT CHANGE UP (ref 3.5–5.3)
PROT SERPL-MCNC: 6.9 G/DL — SIGNIFICANT CHANGE UP (ref 6–8.3)
RBC # BLD: 2.88 M/UL — LOW (ref 3.8–5.2)
RBC # FLD: 14.7 % — HIGH (ref 10.3–14.5)
SODIUM SERPL-SCNC: 142 MMOL/L — SIGNIFICANT CHANGE UP (ref 135–145)
WBC # BLD: 3.4 K/UL — LOW (ref 3.8–10.5)
WBC # FLD AUTO: 3.4 K/UL — LOW (ref 3.8–10.5)

## 2024-08-07 PROCEDURE — 80053 COMPREHEN METABOLIC PANEL: CPT

## 2024-08-07 PROCEDURE — 85025 COMPLETE CBC W/AUTO DIFF WBC: CPT

## 2024-08-07 PROCEDURE — 96413 CHEMO IV INFUSION 1 HR: CPT

## 2024-08-07 PROCEDURE — 96375 TX/PRO/DX INJ NEW DRUG ADDON: CPT

## 2024-08-07 PROCEDURE — 96417 CHEMO IV INFUS EACH ADDL SEQ: CPT

## 2024-08-07 PROCEDURE — 36415 COLL VENOUS BLD VENIPUNCTURE: CPT

## 2024-08-07 RX ORDER — DEXAMETHASONE 1.5 MG/1
10 TABLET ORAL ONCE
Refills: 0 | Status: COMPLETED | OUTPATIENT
Start: 2024-08-07 | End: 2024-08-07

## 2024-08-07 RX ORDER — PACLITAXEL 6 MG/ML
128 INJECTION, SOLUTION INTRAVENOUS ONCE
Refills: 0 | Status: COMPLETED | OUTPATIENT
Start: 2024-08-07 | End: 2024-08-07

## 2024-08-07 RX ORDER — PEMBROLIZUMAB 25 MG/ML
400 INJECTION, SOLUTION INTRAVENOUS ONCE
Refills: 0 | Status: COMPLETED | OUTPATIENT
Start: 2024-08-07 | End: 2024-08-07

## 2024-08-07 RX ORDER — BACTERIOSTATIC SODIUM CHLORIDE 0.9 %
10 VIAL (ML) INJECTION ONCE
Refills: 0 | Status: COMPLETED | OUTPATIENT
Start: 2024-08-07 | End: 2024-08-07

## 2024-08-07 RX ORDER — LIDOCAINE 5% 5 G/100G
1 CREAM TOPICAL ONCE
Refills: 0 | Status: COMPLETED | OUTPATIENT
Start: 2024-08-07 | End: 2024-08-07

## 2024-08-07 RX ORDER — PALONOSETRON HYDROCHLORIDE 0.25 MG/5ML
0.25 INJECTION, SOLUTION INTRAVENOUS ONCE
Refills: 0 | Status: COMPLETED | OUTPATIENT
Start: 2024-08-07 | End: 2024-08-07

## 2024-08-07 RX ORDER — ACETAMINOPHEN 500 MG
650 TABLET ORAL ONCE
Refills: 0 | Status: COMPLETED | OUTPATIENT
Start: 2024-08-07 | End: 2024-08-07

## 2024-08-07 RX ORDER — DIPHENHYDRAMINE HCL 25 MG
25 CAPSULE ORAL ONCE
Refills: 0 | Status: COMPLETED | OUTPATIENT
Start: 2024-08-07 | End: 2024-08-07

## 2024-08-07 RX ORDER — FAMOTIDINE 40 MG/1
20 TABLET, FILM COATED ORAL ONCE
Refills: 0 | Status: COMPLETED | OUTPATIENT
Start: 2024-08-07 | End: 2024-08-07

## 2024-08-07 RX ADMIN — Medication 25 MILLIGRAM(S): at 17:04

## 2024-08-07 RX ADMIN — FAMOTIDINE 20 MILLIGRAM(S): 40 TABLET, FILM COATED ORAL at 16:26

## 2024-08-07 RX ADMIN — DEXAMETHASONE 10 MILLIGRAM(S): 1.5 TABLET ORAL at 16:48

## 2024-08-07 RX ADMIN — PACLITAXEL 128 MILLIGRAM(S): 6 INJECTION, SOLUTION INTRAVENOUS at 17:05

## 2024-08-07 RX ADMIN — DEXAMETHASONE 204 MILLIGRAM(S): 1.5 TABLET ORAL at 16:33

## 2024-08-07 RX ADMIN — Medication 650 MILLIGRAM(S): at 16:24

## 2024-08-07 RX ADMIN — Medication 131 MILLIGRAM(S): at 18:05

## 2024-08-07 RX ADMIN — PEMBROLIZUMAB 400 MILLIGRAM(S): 25 INJECTION, SOLUTION INTRAVENOUS at 15:41

## 2024-08-07 RX ADMIN — PACLITAXEL 128 MILLIGRAM(S): 6 INJECTION, SOLUTION INTRAVENOUS at 18:05

## 2024-08-07 RX ADMIN — Medication 650 MILLIGRAM(S): at 16:51

## 2024-08-07 RX ADMIN — PEMBROLIZUMAB 400 MILLIGRAM(S): 25 INJECTION, SOLUTION INTRAVENOUS at 16:11

## 2024-08-07 RX ADMIN — Medication 10 MILLILITER(S): at 18:36

## 2024-08-07 RX ADMIN — PALONOSETRON HYDROCHLORIDE 0.25 MILLIGRAM(S): 0.25 INJECTION, SOLUTION INTRAVENOUS at 16:24

## 2024-08-07 RX ADMIN — Medication 131 MILLIGRAM(S): at 18:35

## 2024-08-07 RX ADMIN — Medication 202 MILLIGRAM(S): at 16:49

## 2024-08-07 NOTE — PHARMACY COMMUNICATION NOTE - COMMENTS
as per TEAMS chat:    Alessandra Velasco  2024, 2:59 PM  Per Jacinta Angel Danielle DOB 1970 - CBC looks okay. Hold Zarxio per Dr. Schroeder. Thanks!  The patient's scr today is 0.6. Using a minimum serum creatinine of 0.7 yields 162mg which is a greater than 10% diff from 131mg. Please let us know how you would like us to proceed    Hi do not change dose as per Dr. Schroeder - thanks!

## 2024-08-14 ENCOUNTER — APPOINTMENT (OUTPATIENT)
Dept: HEMATOLOGY ONCOLOGY | Facility: CLINIC | Age: 54
End: 2024-08-14
Payer: COMMERCIAL

## 2024-08-14 ENCOUNTER — APPOINTMENT (OUTPATIENT)
Dept: INFUSION THERAPY | Facility: CLINIC | Age: 54
End: 2024-08-14

## 2024-08-14 ENCOUNTER — LABORATORY RESULT (OUTPATIENT)
Age: 54
End: 2024-08-14

## 2024-08-14 ENCOUNTER — NON-APPOINTMENT (OUTPATIENT)
Age: 54
End: 2024-08-14

## 2024-08-14 VITALS
SYSTOLIC BLOOD PRESSURE: 143 MMHG | HEIGHT: 64 IN | WEIGHT: 126 LBS | BODY MASS INDEX: 21.51 KG/M2 | RESPIRATION RATE: 18 BRPM | HEART RATE: 93 BPM | DIASTOLIC BLOOD PRESSURE: 82 MMHG | TEMPERATURE: 98.2 F | OXYGEN SATURATION: 100 %

## 2024-08-14 DIAGNOSIS — T45.1X5D ADVERSE EFFECT OF ANTINEOPLASTIC AND IMMUNOSUPPRESSIVE DRUGS, SUBSEQUENT ENCOUNTER: ICD-10-CM

## 2024-08-14 DIAGNOSIS — K52.1 TOXIC GASTROENTERITIS AND COLITIS: ICD-10-CM

## 2024-08-14 DIAGNOSIS — C50.919 MALIGNANT NEOPLASM OF UNSPECIFIED SITE OF UNSPECIFIED FEMALE BREAST: ICD-10-CM

## 2024-08-14 DIAGNOSIS — C77.9 MALIGNANT NEOPLASM OF UNSPECIFIED SITE OF UNSPECIFIED FEMALE BREAST: ICD-10-CM

## 2024-08-14 DIAGNOSIS — Z87.898 PERSONAL HISTORY OF OTHER SPECIFIED CONDITIONS: ICD-10-CM

## 2024-08-14 DIAGNOSIS — C50.911 MALIGNANT NEOPLASM OF UNSPECIFIED SITE OF RIGHT FEMALE BREAST: ICD-10-CM

## 2024-08-14 DIAGNOSIS — L65.9 NONSCARRING HAIR LOSS, UNSPECIFIED: ICD-10-CM

## 2024-08-14 LAB
ALBUMIN SERPL ELPH-MCNC: 3.9 G/DL
ALP BLD-CCNC: 47 U/L
ALT SERPL-CCNC: 25 U/L
ANION GAP SERPL CALC-SCNC: 7 MMOL/L
AST SERPL-CCNC: 33 U/L
BILIRUB SERPL-MCNC: 0.5 MG/DL
BUN SERPL-MCNC: 11 MG/DL
CALCIUM SERPL-MCNC: 10 MG/DL
CHLORIDE SERPL-SCNC: 109 MMOL/L
CO2 SERPL-SCNC: 27 MMOL/L
CORTISOL: 16.3 UG/DL
CREAT SERPL-MCNC: 0.7 MG/DL
EGFR: 103 ML/MIN/1.73M2
ESTIMATED AVERAGE GLUCOSE: 91 MG/DL
GLUCOSE SERPL-MCNC: 119 MG/DL
HBA1C MFR BLD HPLC: 4.8 %
HCT VFR BLD CALC: 29.6 %
HGB BLD-MCNC: 9.6 G/DL
LYMPHOCYTES # BLD AUTO: 0.52 K/UL
LYMPHOCYTES NFR BLD AUTO: 18 %
MAN DIFF?: YES
MCHC RBC-ENTMCNC: 32.3 PG
MCHC RBC-ENTMCNC: 32.4 GM/DL
MCV RBC AUTO: 99.7 FL
MONOCYTES # BLD AUTO: 0.1 K/UL
MONOCYTES NFR BLD AUTO: 2 %
NEUTROPHILS # BLD AUTO: 2.26 K/UL
NEUTROPHILS NFR BLD AUTO: 78 %
PLATELET # BLD AUTO: 171 K/UL
POTASSIUM SERPL-SCNC: 4.2 MMOL/L
PROT SERPL-MCNC: 6.8 G/DL
RBC # BLD: 2.97 M/UL
RBC # FLD: 14.8 %
SODIUM SERPL-SCNC: 143 MMOL/L
TSH SERPL-ACNC: 0.78 UIU/ML
WBC # FLD AUTO: 2.9 K/UL

## 2024-08-14 PROCEDURE — G2211 COMPLEX E/M VISIT ADD ON: CPT | Mod: NC

## 2024-08-14 PROCEDURE — 99213 OFFICE O/P EST LOW 20 MIN: CPT

## 2024-08-14 NOTE — HISTORY OF PRESENT ILLNESS
[Disease: _____________________] : Disease: [unfilled] [de-identified] : 53 year old female with newly diagnosed multifocal triple negative right breast cancer is referred by Dr. Will Trejo to discuss neoadjuvant systemic therapy. Here today for f/u and tx.   Patient palpated a lump in right breast.  2/21/24 R breast US: New 3 mm hypoechoic mass right 6:00 3 cm from the nipple. New 1 cm hypoechoic mass with indistinct margins right 9:00 4 cm from the nipple . 1.5 x 0.9 x 1.7 cm irregular hypoechoic mass right 10:00 4 cm from the nipple underlying region of palpable mass.  New 1.9 x 0.4 cm irregular hypoechoic mass right 10:00 5 cm from the nipple underlying region of palpable mass. New 1.6 x 1.2 x 1.8 cm irregular hypoechoic mass with cystic component right 10:00 6 cm from the nipple underlying region of palpable mass. Lymph nodes with thickened cortex right axillary region.  3/5/24 b/l dx MG BREAST COMPOSITION:  The breasts are extremely dense, which lowers the sensitivity of mammography.  Spot compression views of the upper outer right breast do not demonstrate any distinct mammographic abnormalities, probably due to the dense nature of the patient's breast. No suspicious masses, architectural distortion, or significant calcifications are detected in the left breast. b/l US: - 6 o'clock axis, 3 cm from the nipple, 0.3 x 0.3 x 0.4 cm ovoid circumscribed hypoechoic mass -9 o'clock axis, 4 cm from the nipple, 1.0 x 0.6 x 0.5 cm ovoid parallel hypoechoic mass with irregular margins. Ultrasound-guided biopsy is recommended. -10 o'clock axis, 4 cm from the nipple, 1.6 x 0.9 x 1.7 cm ovoid parallel hypoechoic mass with irregular margins,  -10 o'clock axis, 4 cm from the nipple, 1.4 x 0.8 x 1.5 cm ovoid parallel hypoechoic mass with irregular margins -10 o'clock axis, 5 cm from the nipple, 2.5 x 0.7 x 1.4 cm irregular hypoechoic mass -10 o'clock axis, 6 cm from the nipple, 2.6 x 1.3 x 1.9 cm irregular hypoechoic mass. -The irregular hypoechoic masses at the 10 o'clock axis, spanning the 4 to 6 cm from the nipple region, appear to be contiguous with one another and can be biopsied simultaneously. -Abnormally thickened right axillary lymph nodes, with the most prominent measuring 1.9 x 0.9 x 1.7 cm. No suspicious solid or complex cystic mass is detected in the left breast. No morphologically abnormal axillary lymph nodes are detected.   3/5/24 US guided biopsy x 3: 1. Right breast 10 o'clock axis, 4-6 cm from the nipple, core biopsy: -Poorly differentiated invasive ductal carcinoma, measuring 0.9 cm in maximal length in this material  ER 0%, [place] 0%, HER2 1+, Ki-67 60% -Ductal carcinoma in situ is also present, intermediate to high nuclear grade, solid pattern with extension into lobules. 2. Right breast 9 o'clock axis, 4 cm from the nipple, core biopsy: -Poorly differentiated invasive ductal carcinoma morphologically similar to specimen 1, measuring 0.5 cm in maximal length in this material. ER 0%, [place] 0%, HER2 0, Ki-67 44% -Ductal carcinoma in situ is also present, intermediate to high nuclear grade, solid pattern, with extension into lobules. 3. Right axilla, core biopsy: Metastatic carcinoma involving lymph node tissue and morphologically similar to specimen 1 and 2  3/26/24: MRI B/L Breast: Biopsy-proven right breast malignancy measuring at least 5.5 cm. Possible satellite upper central right breast, 2 closed the chest wall to biopsy. Recommend surgical/oncologic managment. Metastatic lymph node in the right axilla. No evidence of left breast malignancy.  3/27/24: PET showed enhancing lesion in R breast c/w her newly diagnosed breast cancer in addition to R axillary LAD. Focal hypermetabolic activity also noted in L colon which warrants GI evaluation at a later date 4/3/24: C1 ddAC + pembrolizumab q6w 4/17/24: C2 ddAC + neulasta onpro 5/1/24: C3 ddAC + neulasta onpro 5/15/24: C4 ddAC + neulasta onpro 5/29/24: C1 carbo/taxol 8/14/24 C12/12 carbo/taxol  [de-identified] : poorly differentiated invasive ductal carcinoma [de-identified] : ER-, [place]-, HER2 0-1+, Ki-67 44-60% [de-identified] : Here to continue treatment with weekly carbo/taxol and pembro Q6wks.  Reports a dry cough associated with nasal congestion/sneezing, especially when outside, but also a cough which patient thinks is worse on lying flat.   Denies fever/chills, sob/dobbs, sore throat, chest pain, diarrhea, vomiting, neuropathy, rash.  6/20 Pt called with complaints of frequent loose stools up to 6x/day and fever of up to 101F today. Last chemo treatment 6/19/24. Asked pt to go to the ER; pt expressed verbal understanding and agreement with plan to rule out infection, address infection if present and receive IV hydration as well in ER. Stated will go to [facility] ED today IVF and discharged. Taking immodium 3-4/d  6/26/24 bm stool still loose. 2 lb wt loss, on medical leave , c/o fatigue, no cough , CP or SOB, hot flushes. LMP 2/23/24, occ light headed with change position, no paresthesias, alopecia, no n/v   7/10/24 no diarrhea - changed diet no dairy , low fiber. Trying to inc intake KCal No fever, rash, or recent illness.  No joint pain/swelling/stiffness.  No eye pain/redness/change in vision.  No sores in the mouth or nose.  No difficulty swallowing.  No chest pain or shortness of breath.  No abdominal complaints or weight loss.  No weakness.  No headaches or focal neurological deficits.  No urinary changes.  No other new symptoms. Taking probiotic. Fatigue better. Back and Knee pain better. Not taking gabapentin or meloxicam  7/31/24 appetite better - low fiber, no dairy- no diarrhea. gained 2 lbs no mucositis, no neuropathy. mild fatigue. LMP 2/24. No SE p xarxio. after each rx s/p 9/12 8/14/24 feels stuffy in the morning with expectorated mucus, no GI symptoms, mild fatigue, Mild neuropathy in R thumb which resolves within minutes.Appetite improved Wt stable

## 2024-08-14 NOTE — ASSESSMENT
[FreeTextEntry1] : 53 year old perimenopausal woman (LMP 10/2023) with newly diagnosed multifocal triple negative right breast cancer is referred by Dr. Will Trejo to discuss neoadjuvant systemic therapy. cT3 cN1 Stage IIIC poorly diff IDC ER 0% NE 0% HER2 renetta 1+ Ki 67 60%  Treated with NACT: ddAC q2wk x 4, pembro q6wk --> TC q1wk x 12 + pembro, Today was 12/12 TC ECHO 6/11/24 EF nml. f/u with Breast surgeon Dr. Will Trejo 8/26/24. Further treatment pending radiologic and subsequent surgery eval. RTC for f/u 9/4.

## 2024-08-14 NOTE — HISTORY OF PRESENT ILLNESS
[Disease: _____________________] : Disease: [unfilled] [de-identified] : 53 year old female with newly diagnosed multifocal triple negative right breast cancer is referred by Dr. Will Trejo to discuss neoadjuvant systemic therapy. Here today for f/u and tx.   Patient palpated a lump in right breast.  2/21/24 R breast US: New 3 mm hypoechoic mass right 6:00 3 cm from the nipple. New 1 cm hypoechoic mass with indistinct margins right 9:00 4 cm from the nipple . 1.5 x 0.9 x 1.7 cm irregular hypoechoic mass right 10:00 4 cm from the nipple underlying region of palpable mass.  New 1.9 x 0.4 cm irregular hypoechoic mass right 10:00 5 cm from the nipple underlying region of palpable mass. New 1.6 x 1.2 x 1.8 cm irregular hypoechoic mass with cystic component right 10:00 6 cm from the nipple underlying region of palpable mass. Lymph nodes with thickened cortex right axillary region.  3/5/24 b/l dx MG BREAST COMPOSITION:  The breasts are extremely dense, which lowers the sensitivity of mammography.  Spot compression views of the upper outer right breast do not demonstrate any distinct mammographic abnormalities, probably due to the dense nature of the patient's breast. No suspicious masses, architectural distortion, or significant calcifications are detected in the left breast. b/l US: - 6 o'clock axis, 3 cm from the nipple, 0.3 x 0.3 x 0.4 cm ovoid circumscribed hypoechoic mass -9 o'clock axis, 4 cm from the nipple, 1.0 x 0.6 x 0.5 cm ovoid parallel hypoechoic mass with irregular margins. Ultrasound-guided biopsy is recommended. -10 o'clock axis, 4 cm from the nipple, 1.6 x 0.9 x 1.7 cm ovoid parallel hypoechoic mass with irregular margins,  -10 o'clock axis, 4 cm from the nipple, 1.4 x 0.8 x 1.5 cm ovoid parallel hypoechoic mass with irregular margins -10 o'clock axis, 5 cm from the nipple, 2.5 x 0.7 x 1.4 cm irregular hypoechoic mass -10 o'clock axis, 6 cm from the nipple, 2.6 x 1.3 x 1.9 cm irregular hypoechoic mass. -The irregular hypoechoic masses at the 10 o'clock axis, spanning the 4 to 6 cm from the nipple region, appear to be contiguous with one another and can be biopsied simultaneously. -Abnormally thickened right axillary lymph nodes, with the most prominent measuring 1.9 x 0.9 x 1.7 cm. No suspicious solid or complex cystic mass is detected in the left breast. No morphologically abnormal axillary lymph nodes are detected.   3/5/24 US guided biopsy x 3: 1. Right breast 10 o'clock axis, 4-6 cm from the nipple, core biopsy: -Poorly differentiated invasive ductal carcinoma, measuring 0.9 cm in maximal length in this material  ER 0%, [place] 0%, HER2 1+, Ki-67 60% -Ductal carcinoma in situ is also present, intermediate to high nuclear grade, solid pattern with extension into lobules. 2. Right breast 9 o'clock axis, 4 cm from the nipple, core biopsy: -Poorly differentiated invasive ductal carcinoma morphologically similar to specimen 1, measuring 0.5 cm in maximal length in this material. ER 0%, [place] 0%, HER2 0, Ki-67 44% -Ductal carcinoma in situ is also present, intermediate to high nuclear grade, solid pattern, with extension into lobules. 3. Right axilla, core biopsy: Metastatic carcinoma involving lymph node tissue and morphologically similar to specimen 1 and 2  3/26/24: MRI B/L Breast: Biopsy-proven right breast malignancy measuring at least 5.5 cm. Possible satellite upper central right breast, 2 closed the chest wall to biopsy. Recommend surgical/oncologic managment. Metastatic lymph node in the right axilla. No evidence of left breast malignancy.  3/27/24: PET showed enhancing lesion in R breast c/w her newly diagnosed breast cancer in addition to R axillary LAD. Focal hypermetabolic activity also noted in L colon which warrants GI evaluation at a later date 4/3/24: C1 ddAC + pembrolizumab q6w 4/17/24: C2 ddAC + neulasta onpro 5/1/24: C3 ddAC + neulasta onpro 5/15/24: C4 ddAC + neulasta onpro 5/29/24: C1 carbo/taxol 8/14/24 C12/12 carbo/taxol  [de-identified] : poorly differentiated invasive ductal carcinoma [de-identified] : ER-, [place]-, HER2 0-1+, Ki-67 44-60% [de-identified] : Here to continue treatment with weekly carbo/taxol and pembro Q6wks.  Reports a dry cough associated with nasal congestion/sneezing, especially when outside, but also a cough which patient thinks is worse on lying flat.   Denies fever/chills, sob/dobbs, sore throat, chest pain, diarrhea, vomiting, neuropathy, rash.  6/20 Pt called with complaints of frequent loose stools up to 6x/day and fever of up to 101F today. Last chemo treatment 6/19/24. Asked pt to go to the ER; pt expressed verbal understanding and agreement with plan to rule out infection, address infection if present and receive IV hydration as well in ER. Stated will go to [facility] ED today IVF and discharged. Taking immodium 3-4/d  6/26/24 bm stool still loose. 2 lb wt loss, on medical leave , c/o fatigue, no cough , CP or SOB, hot flushes. LMP 2/23/24, occ light headed with change position, no paresthesias, alopecia, no n/v   7/10/24 no diarrhea - changed diet no dairy , low fiber. Trying to inc intake KCal No fever, rash, or recent illness.  No joint pain/swelling/stiffness.  No eye pain/redness/change in vision.  No sores in the mouth or nose.  No difficulty swallowing.  No chest pain or shortness of breath.  No abdominal complaints or weight loss.  No weakness.  No headaches or focal neurological deficits.  No urinary changes.  No other new symptoms. Taking probiotic. Fatigue better. Back and Knee pain better. Not taking gabapentin or meloxicam  7/31/24 appetite better - low fiber, no dairy- no diarrhea. gained 2 lbs no mucositis, no neuropathy. mild fatigue. LMP 2/24. No SE p xarxio. after each rx s/p 9/12 8/14/24 feels stuffy in the morning with expectorated mucus, no GI symptoms, mild fatigue, Mild neuropathy in R thumb which resolves within minutes.Appetite improved Wt stable

## 2024-08-14 NOTE — REVIEW OF SYSTEMS
[Fatigue] : fatigue [Diarrhea: Grade 0] : Diarrhea: Grade 0 [Negative] : Allergic/Immunologic [Cough] : cough [FreeTextEntry2] : no wt loss

## 2024-08-14 NOTE — PHYSICAL EXAM
[Restricted in physically strenuous activity but ambulatory and able to carry out work of a light or sedentary nature] : Status 1- Restricted in physically strenuous activity but ambulatory and able to carry out work of a light or sedentary nature, e.g., light house work, office work [Thin] : thin [Normal] : affect appropriate [de-identified] : rt breast 1 cm uoq, lt unremarkable no palp LN [de-identified] : alopecia

## 2024-08-14 NOTE — PHYSICAL EXAM
[Restricted in physically strenuous activity but ambulatory and able to carry out work of a light or sedentary nature] : Status 1- Restricted in physically strenuous activity but ambulatory and able to carry out work of a light or sedentary nature, e.g., light house work, office work [Thin] : thin [Normal] : affect appropriate [de-identified] : rt breast 1 cm uoq, lt unremarkable no palp LN [de-identified] : alopecia

## 2024-08-14 NOTE — ASSESSMENT
[FreeTextEntry1] : 53 year old perimenopausal woman (LMP 10/2023) with newly diagnosed multifocal triple negative right breast cancer is referred by Dr. Will Trejo to discuss neoadjuvant systemic therapy. cT3 cN1 Stage IIIC poorly diff IDC ER 0% [place] 0% HER2 renetta 1+ Ki 67 60%  Treated with NACT: ddAC q2wk x 4, pembro q6wk --> TC q1wk x 12 + pembro, Today was 12/12 TC ECHO 6/11/24 EF nml. f/u with Breast surgeon Dr. Will Trejo 8/26/24. Further treatment pending radiologic and subsequent surgery eval. RTC for f/u 9/4.

## 2024-08-14 NOTE — HISTORY OF PRESENT ILLNESS
[Disease: _____________________] : Disease: [unfilled] [de-identified] : 53 year old female with newly diagnosed multifocal triple negative right breast cancer is referred by Dr. Will Trejo to discuss neoadjuvant systemic therapy. Here today for f/u and tx.   Patient palpated a lump in right breast.  2/21/24 R breast US: New 3 mm hypoechoic mass right 6:00 3 cm from the nipple. New 1 cm hypoechoic mass with indistinct margins right 9:00 4 cm from the nipple . 1.5 x 0.9 x 1.7 cm irregular hypoechoic mass right 10:00 4 cm from the nipple underlying region of palpable mass.  New 1.9 x 0.4 cm irregular hypoechoic mass right 10:00 5 cm from the nipple underlying region of palpable mass. New 1.6 x 1.2 x 1.8 cm irregular hypoechoic mass with cystic component right 10:00 6 cm from the nipple underlying region of palpable mass. Lymph nodes with thickened cortex right axillary region.  3/5/24 b/l dx MG BREAST COMPOSITION:  The breasts are extremely dense, which lowers the sensitivity of mammography.  Spot compression views of the upper outer right breast do not demonstrate any distinct mammographic abnormalities, probably due to the dense nature of the patient's breast. No suspicious masses, architectural distortion, or significant calcifications are detected in the left breast. b/l US: - 6 o'clock axis, 3 cm from the nipple, 0.3 x 0.3 x 0.4 cm ovoid circumscribed hypoechoic mass -9 o'clock axis, 4 cm from the nipple, 1.0 x 0.6 x 0.5 cm ovoid parallel hypoechoic mass with irregular margins. Ultrasound-guided biopsy is recommended. -10 o'clock axis, 4 cm from the nipple, 1.6 x 0.9 x 1.7 cm ovoid parallel hypoechoic mass with irregular margins,  -10 o'clock axis, 4 cm from the nipple, 1.4 x 0.8 x 1.5 cm ovoid parallel hypoechoic mass with irregular margins -10 o'clock axis, 5 cm from the nipple, 2.5 x 0.7 x 1.4 cm irregular hypoechoic mass -10 o'clock axis, 6 cm from the nipple, 2.6 x 1.3 x 1.9 cm irregular hypoechoic mass. -The irregular hypoechoic masses at the 10 o'clock axis, spanning the 4 to 6 cm from the nipple region, appear to be contiguous with one another and can be biopsied simultaneously. -Abnormally thickened right axillary lymph nodes, with the most prominent measuring 1.9 x 0.9 x 1.7 cm. No suspicious solid or complex cystic mass is detected in the left breast. No morphologically abnormal axillary lymph nodes are detected.   3/5/24 US guided biopsy x 3: 1. Right breast 10 o'clock axis, 4-6 cm from the nipple, core biopsy: -Poorly differentiated invasive ductal carcinoma, measuring 0.9 cm in maximal length in this material  ER 0%, CA 0%, HER2 1+, Ki-67 60% -Ductal carcinoma in situ is also present, intermediate to high nuclear grade, solid pattern with extension into lobules. 2. Right breast 9 o'clock axis, 4 cm from the nipple, core biopsy: -Poorly differentiated invasive ductal carcinoma morphologically similar to specimen 1, measuring 0.5 cm in maximal length in this material. ER 0%, CA 0%, HER2 0, Ki-67 44% -Ductal carcinoma in situ is also present, intermediate to high nuclear grade, solid pattern, with extension into lobules. 3. Right axilla, core biopsy: Metastatic carcinoma involving lymph node tissue and morphologically similar to specimen 1 and 2  3/26/24: MRI B/L Breast: Biopsy-proven right breast malignancy measuring at least 5.5 cm. Possible satellite upper central right breast, 2 closed the chest wall to biopsy. Recommend surgical/oncologic managment. Metastatic lymph node in the right axilla. No evidence of left breast malignancy.  3/27/24: PET showed enhancing lesion in R breast c/w her newly diagnosed breast cancer in addition to R axillary LAD. Focal hypermetabolic activity also noted in L colon which warrants GI evaluation at a later date 4/3/24: C1 ddAC + pembrolizumab q6w 4/17/24: C2 ddAC + neulasta onpro 5/1/24: C3 ddAC + neulasta onpro 5/15/24: C4 ddAC + neulasta onpro 5/29/24: C1 carbo/taxol 8/14/24 C12/12 carbo/taxol  [de-identified] : poorly differentiated invasive ductal carcinoma [de-identified] : ER-, IN-, HER2 0-1+, Ki-67 44-60% [de-identified] : Here to continue treatment with weekly carbo/taxol and pembro Q6wks.  Reports a dry cough associated with nasal congestion/sneezing, especially when outside, but also a cough which patient thinks is worse on lying flat.   Denies fever/chills, sob/dobbs, sore throat, chest pain, diarrhea, vomiting, neuropathy, rash.  6/20 Pt called with complaints of frequent loose stools up to 6x/day and fever of up to 101F today. Last chemo treatment 6/19/24. Asked pt to go to the ER; pt expressed verbal understanding and agreement with plan to rule out infection, address infection if present and receive IV hydration as well in ER. Stated will go to [facility] ED today IVF and discharged. Taking immodium 3-4/d  6/26/24 bm stool still loose. 2 lb wt loss, on medical leave , c/o fatigue, no cough , CP or SOB, hot flushes. LMP 2/23/24, occ light headed with change position, no paresthesias, alopecia, no n/v   7/10/24 no diarrhea - changed diet no dairy , low fiber. Trying to inc intake KCal No fever, rash, or recent illness.  No joint pain/swelling/stiffness.  No eye pain/redness/change in vision.  No sores in the mouth or nose.  No difficulty swallowing.  No chest pain or shortness of breath.  No abdominal complaints or weight loss.  No weakness.  No headaches or focal neurological deficits.  No urinary changes.  No other new symptoms. Taking probiotic. Fatigue better. Back and Knee pain better. Not taking gabapentin or meloxicam  7/31/24 appetite better - low fiber, no dairy- no diarrhea. gained 2 lbs no mucositis, no neuropathy. mild fatigue. LMP 2/24. No SE p xarxio. after each rx s/p 9/12 8/14/24 feels stuffy in the morning with expectorated mucus, no GI symptoms, mild fatigue, Mild neuropathy in R thumb which resolves within minutes.Appetite improved Wt stable

## 2024-08-14 NOTE — PHYSICAL EXAM
[Restricted in physically strenuous activity but ambulatory and able to carry out work of a light or sedentary nature] : Status 1- Restricted in physically strenuous activity but ambulatory and able to carry out work of a light or sedentary nature, e.g., light house work, office work [Thin] : thin [Normal] : affect appropriate [de-identified] : rt breast 1 cm uoq, lt unremarkable no palp LN [de-identified] : alopecia

## 2024-08-14 NOTE — ASSESSMENT
[FreeTextEntry1] : 53 year old perimenopausal woman (LMP 10/2023) with newly diagnosed multifocal triple negative right breast cancer is referred by Dr. Will Trejo to discuss neoadjuvant systemic therapy. cT3 cN1 Stage IIIC poorly diff IDC ER 0% [place] 0% HER2 renetta 1+ Ki 67 60%  Treated with NACT: ddAC q2wk x 4, pembro q6wk --> TC q1wk x 12 + pembro, Today was 12/12 TC ECHO 6/11/24 EF nml. f/u with Breast surgeon Dr. Will Trejo 8/26/24. Further treatment pending radiologic and subsequent surgery eval. RTC for f/u 9/4.

## 2024-08-26 ENCOUNTER — APPOINTMENT (OUTPATIENT)
Dept: BREAST CENTER | Facility: CLINIC | Age: 54
End: 2024-08-26
Payer: COMMERCIAL

## 2024-08-26 VITALS
WEIGHT: 124 LBS | HEIGHT: 64 IN | SYSTOLIC BLOOD PRESSURE: 147 MMHG | DIASTOLIC BLOOD PRESSURE: 81 MMHG | HEART RATE: 103 BPM | BODY MASS INDEX: 21.17 KG/M2

## 2024-08-26 PROCEDURE — 99215 OFFICE O/P EST HI 40 MIN: CPT

## 2024-08-26 NOTE — HISTORY OF PRESENT ILLNESS
[FreeTextEntry1] : Patient is a 52 yo F s/p neoadjuvant chemotherapy here for surgical discussion, history of R US bx proven metastatic triple negative IDC found as palpable mass by patient in December 2023 and seen on R US as 1 cm mass w/ irregular margins 9:00 4FN and as 3 contiguous masses at 10:00 measuring 1.7 cm 4FN, 1.9 cm 5FN, 1.8 cm 6FN (2/2024). R axillary US bx of abnl R 2.2 cm LN was performed and yielded metastatic carcinoma c/w breast primary. BRCA -. Family history of breast cancer in sister (Age 50s, unknown genetics) and maternal grandmother (Age 60s, unknown genetics), and maternal cousin w/ ovarian cancer (Age 40s, unknown genetics, DOD). Denies prior breast surgeries. Patient denies skin changes or nipple discharge bilaterally. Patient has hx herniated disc and L knee arthritis for which she takes gabapentin, meloxicam, and tizanidine.  Staging: cT3 cN1   2/21/24: R US (palpable)- new 0.3 cm mass 6:00 3FN, new 1 cm mass w/ indistinct margins 9:00 4FN (rec R US bx), 1.7 cm irregular mass 10:00 4FN (palpable region- rec R US bx), new 1.9 cm irregular mass 10:00 5FN (palpable region-rec R US bx), new 1.8 cm irregular cystic mass 10:00 6FN (palpable region-rec R US bx), R axillary region LNs w/ thickened cortex (rec R US bx). Rec b/l dxMG and L US prior to bx. BIRADS 5. 3/5/24: B/l MG & US- extremely dense, 1 cm mass w/ indistinct margins 9:00 4FN (rec R US bx), 1.7 cm irregular mass 10:00 4FN, 1.9 cm irregular mass 10:00 5FN, 1.8 cm irregular cystic mass 10:00 6FN (all masses 10:00 4-6FN palpable region appear to be contiguous -rec R US bx), R 1.9 cm abnormal axillary LN (rec R US bx), L-no suspicious solid or complex mass detected. BIRADS 4. 3/5/24: R US bx x3- SITE 1) R contiguous masses 10:00 4-6FN (ribbon clip)- IDC (poorly differentiated) (ER-(0%)/HI- (0%)/HER2- (1+)), DCIS (intermediate to high nuclear grade, solid pattern, w/ extension into lobules). Malignant & Concordant. SITE 2) R 1.2 cm mass 9:00 4FN (heart clip)- IDC (poorly differentiated) (ER- (0%)/HI- (0%)/HER2- (0), DCIS (intermediate to high nuclear grade, solid pattern, w/ extension into lobules). Malignant & Concordant. SITE 3) R 2.2 cm axillary LN (Venus clip, Q clip)- metastatic carcinoma involving LN tissue and morphologically similar to specimens 1 & 2. 3/22/24: MRI B/L Breast: Biopsy-proven right breast malignancy measuring at least 5.5 cm. Possible satellite upper central right breast, 2 closed the chest wall to biopsy. Recommend surgical/oncologic managment. Metastatic lymph node in the right axilla. No evidence of left breast malignancy. 3/27/2024 PETCT:1Ill-defined hypermetabolic enhancing lesion R lateral breast,consistent with patient's known breast cancer. 2.  Hypermetabolic right axillary lymphadenopathy, consistent with patient's known axillary metastases. 3.  Focal hypermetabolic activity in the left colon, which is not opacified on the CT images, nonspecific, but a colonic adenoma or primary colon cancer could produce this appearance. (rec Colonoscopy) 8/26/2024 MRI: scheduled 8/16/2024 B/L MG & targeted R US: Heterogeneously dense.  R UOQ and lateral aspect significant interval decrease in size and extent of suspected disease (ribbon and heart biopsy clips).  R UOQ 10:00 7 CFN persistent irregular mass identified, favored to correspond to residual enhancing mass seen on same-day breast MRI, 3.5 cm from ribbon clip and 4.5 cm from heart clip.  US-R 10:00 4 CFN no residual mass, treatment response.  R 9:00 4 CFN in the area of prior biopsy-proven malignancy, no residual mass, treatment response.  R 10:00 6 CFN 1.3 cm irregular hypoechoic mass with irregular margins with MRI correlate (rec US BX if clinically appropriate).  Normal lymph nodes without evidence of adenopathy, significant treatment response.  BI-RADS 4

## 2024-08-26 NOTE — PLAN
[TextEntry] :  Reviewed imaging findings and pathology discussed results with patient.  Reviewed imaging results in great detail with radiologist who states affected area although much improved on imaging and now measuring 1.5 cm, total involved area involves majority of upper outer quadrant of right breast.  Positive axillary lymph node no longer visualized.  Discussed once again diagnosis of R triple negative IDC as an invasive breast cancer and need for surgical excision. Reviewed neoadjuvant treatment of chemotherapy and additionally adjuvant treatments of radiation therapy. Discussed surgical options of R nloc lumpx & SNLB vs TM. Discussed the indication for additional surgical excision depending upon the final surgical pathology. Discussed the benefits and the risks of the surgery not limited to anesthesia risks, bleeding, skin breakdown, infection, and numbness of the skin. Genetic testing negative 3/2024. Slide review to be sent (YES-CBL). Patient will need medical clearance prior to surgery. Plan for R TM, targeted right axillary dissection of [place], right sentinel lymph node biopsy.  Patient is to see plastic surgeon to discuss reconstructive options.    L-dex measurement obtained in the office 3/2024. The score within normal limits. Bioimpedance spectroscopy helps identify the onset of lymphedema in an arm or leg before patients experience noticeable swelling. Research has shown that the early detection of lymphedema using L-Dex combined with treatment can reduce progression to chronic lymphedema by 95% in breast cancer patients. Whenever possible, patients are tested for baseline L-Dex score before cancer treatment begins and then are reassessed during regular follow-up visits using the SOZO device. Otherwise, this can be started postoperatively and continued during regular follow-up visits. If the patients L-Dex score increases above normal levels, that is a sign that lymphedema is developing and a referral is made to physical therapy for further evaluation and early compression treatment.  Breast cancer, right (174.9) (C50.911)

## 2024-08-26 NOTE — PHYSICAL EXAM
[de-identified] : R palpable mass 1.5 cm upper outer quadrant .  Lymph node not palpable  [de-identified] : L breast/axilla/supraclavicular area: No masses, discharge, or adenopathy

## 2024-08-26 NOTE — PHYSICAL EXAM
[de-identified] : R palpable mass 1.5 cm upper outer quadrant .  Lymph node not palpable  [de-identified] : L breast/axilla/supraclavicular area: No masses, discharge, or adenopathy

## 2024-08-26 NOTE — HISTORY OF PRESENT ILLNESS
[FreeTextEntry1] : Patient is a 52 yo F s/p neoadjuvant chemotherapy here for surgical discussion, history of R US bx proven metastatic triple negative IDC found as palpable mass by patient in December 2023 and seen on R US as 1 cm mass w/ irregular margins 9:00 4FN and as 3 contiguous masses at 10:00 measuring 1.7 cm 4FN, 1.9 cm 5FN, 1.8 cm 6FN (2/2024). R axillary US bx of abnl R 2.2 cm LN was performed and yielded metastatic carcinoma c/w breast primary. BRCA -. Family history of breast cancer in sister (Age 50s, unknown genetics) and maternal grandmother (Age 60s, unknown genetics), and maternal cousin w/ ovarian cancer (Age 40s, unknown genetics, DOD). Denies prior breast surgeries. Patient denies skin changes or nipple discharge bilaterally. Patient has hx herniated disc and L knee arthritis for which she takes gabapentin, meloxicam, and tizanidine.  Staging: cT3 cN1   2/21/24: R US (palpable)- new 0.3 cm mass 6:00 3FN, new 1 cm mass w/ indistinct margins 9:00 4FN (rec R US bx), 1.7 cm irregular mass 10:00 4FN (palpable region- rec R US bx), new 1.9 cm irregular mass 10:00 5FN (palpable region-rec R US bx), new 1.8 cm irregular cystic mass 10:00 6FN (palpable region-rec R US bx), R axillary region LNs w/ thickened cortex (rec R US bx). Rec b/l dxMG and L US prior to bx. BIRADS 5. 3/5/24: B/l MG & US- extremely dense, 1 cm mass w/ indistinct margins 9:00 4FN (rec R US bx), 1.7 cm irregular mass 10:00 4FN, 1.9 cm irregular mass 10:00 5FN, 1.8 cm irregular cystic mass 10:00 6FN (all masses 10:00 4-6FN palpable region appear to be contiguous -rec R US bx), R 1.9 cm abnormal axillary LN (rec R US bx), L-no suspicious solid or complex mass detected. BIRADS 4. 3/5/24: R US bx x3- SITE 1) R contiguous masses 10:00 4-6FN (ribbon clip)- IDC (poorly differentiated) (ER-(0%)/IN- (0%)/HER2- (1+)), DCIS (intermediate to high nuclear grade, solid pattern, w/ extension into lobules). Malignant & Concordant. SITE 2) R 1.2 cm mass 9:00 4FN (heart clip)- IDC (poorly differentiated) (ER- (0%)/IN- (0%)/HER2- (0), DCIS (intermediate to high nuclear grade, solid pattern, w/ extension into lobules). Malignant & Concordant. SITE 3) R 2.2 cm axillary LN (Venus clip, Q clip)- metastatic carcinoma involving LN tissue and morphologically similar to specimens 1 & 2. 3/22/24: MRI B/L Breast: Biopsy-proven right breast malignancy measuring at least 5.5 cm. Possible satellite upper central right breast, 2 closed the chest wall to biopsy. Recommend surgical/oncologic managment. Metastatic lymph node in the right axilla. No evidence of left breast malignancy. 3/27/2024 PETCT:1Ill-defined hypermetabolic enhancing lesion R lateral breast,consistent with patient's known breast cancer. 2.  Hypermetabolic right axillary lymphadenopathy, consistent with patient's known axillary metastases. 3.  Focal hypermetabolic activity in the left colon, which is not opacified on the CT images, nonspecific, but a colonic adenoma or primary colon cancer could produce this appearance. (rec Colonoscopy) 8/26/2024 MRI: scheduled 8/16/2024 B/L MG & targeted R US: Heterogeneously dense.  R UOQ and lateral aspect significant interval decrease in size and extent of suspected disease (ribbon and heart biopsy clips).  R UOQ 10:00 7 CFN persistent irregular mass identified, favored to correspond to residual enhancing mass seen on same-day breast MRI, 3.5 cm from ribbon clip and 4.5 cm from heart clip.  US-R 10:00 4 CFN no residual mass, treatment response.  R 9:00 4 CFN in the area of prior biopsy-proven malignancy, no residual mass, treatment response.  R 10:00 6 CFN 1.3 cm irregular hypoechoic mass with irregular margins with MRI correlate (rec US BX if clinically appropriate).  Normal lymph nodes without evidence of adenopathy, significant treatment response.  BI-RADS 4

## 2024-08-27 ENCOUNTER — APPOINTMENT (OUTPATIENT)
Dept: PLASTIC SURGERY | Facility: CLINIC | Age: 54
End: 2024-08-27
Payer: COMMERCIAL

## 2024-08-27 VITALS — WEIGHT: 124 LBS | HEIGHT: 64 IN | BODY MASS INDEX: 21.17 KG/M2 | HEART RATE: 103 BPM | OXYGEN SATURATION: 98 %

## 2024-08-27 DIAGNOSIS — Z42.1 ENCOUNTER FOR BREAST RECONSTRUCTION FOLLOWING MASTECTOMY: ICD-10-CM

## 2024-08-27 DIAGNOSIS — C50.911 MALIGNANT NEOPLASM OF UNSPECIFIED SITE OF RIGHT FEMALE BREAST: ICD-10-CM

## 2024-08-27 DIAGNOSIS — Z78.9 OTHER SPECIFIED HEALTH STATUS: ICD-10-CM

## 2024-08-27 PROCEDURE — 99204 OFFICE O/P NEW MOD 45 MIN: CPT

## 2024-08-30 NOTE — CONSULT LETTER
[Consult Letter:] : I had the pleasure of evaluating your patient, [unfilled]. [Please see my note below.] : Please see my note below. [Consult Closing:] : Thank you very much for allowing me to participate in the care of this patient.  If you have any questions, please do not hesitate to contact me. [Sincerely,] : Sincerely, [FreeTextEntry2] : Vida Austin MD [address] [place] [address] 8th [place] [address] [FreeTextEntry3] : Oren Lerman, MD, FACS Cosmetic & Reconstructive Plastic Surgery Associate , Department of Plastic Surgery - [facility] Associate Professor of Surgery - [facility] of [facility] at [facility] Tel: [phone] Fax: [phone] [email or web address]

## 2024-08-30 NOTE — HISTORY OF PRESENT ILLNESS
[FreeTextEntry1] : 52 y/o female with right breast IDC referred by Dr. Austin presents for initial consultation to discuss breast reconstruction options after right mastectomy. Larisa finished neoadjuvant chemotherapy on 08/14/2024 and she will need radiation after surgery. Patient had genetic test done, results negative. Sister and maternal grandmother were diagnosed with breast cancer. Maternal cousin diagnosed with ovarian cancer. Patient had PET CT on 03/27/2024 and breast MRI on 08/26/2024. No history of bleeding or clotting disorder.

## 2024-08-30 NOTE — PHYSICAL EXAM
[Bra Size: _______] : Bra Size: [unfilled] [de-identified] : SN - N: 18.5cm, BD: 13.5cm, no ptosis, small breast mound, no scars, no nipple discharge [de-identified] : NT, ND, No masses, has laxity and adequate tisse for stacked kofi flap recon to re-aproximate size of right breast.

## 2024-08-30 NOTE — PHYSICAL EXAM
[Bra Size: _______] : Bra Size: [unfilled] [de-identified] : SN - N: 18.5cm, BD: 13.5cm, no ptosis, small breast mound, no scars, no nipple discharge [de-identified] : NT, ND, No masses, has laxity and adequate tisse for stacked kofi flap recon to re-aproximate size of right breast.

## 2024-08-30 NOTE — ASSESSMENT
[FreeTextEntry1] : I reviewed with Ms. TELLES in detail the risks, benefits, and alternatives of both implant based breast reconstruction.  Because she needs adjuvant radiation therapy she is at high risk for implant related complications including high-grade capsule contracture I explained to her that autologous tissue reconstruction is preferred in the setting of radiation therapy.  She is very thin but she has small breasts and she would be a candidate for stacked Olga flap as a delayed option at this time she wishes to proceed with.  Immediate tissue expander reconstruction.  I explained to her than an implant reconstruction usually consists of two separate stages with two surgeries spaced about 4 months apart. At the time of the mastectomy, a tissue expander is placed underneath the pectoralis muscle or on top of the pectoralis muscle and is often reinforced with biologic mesh - acellular dermal matrix.  We expand the tissue expander secondarily in the office by injecting saline into the implant percutaneously until the desired size breast mound is achieved. At that point, a second stage operation is scheduled where we take her back to the operating room and remove the tissue expander and place a permanent breast implant. The permanent prosthesis can be either silicone or saline. The first stage of the reconstruction is approximately 3 hours for one breast and 4-5 hours for a bilateral procedure, with a 1-2 night hospital stay and a four-week recovery. The second stage surgery is an outpatient procedure with a two-week recovery. I reviewed with her the risks of implant reconstruction including, but not limited to, bleeding, scarring, implant infection, implant rupture, capsule contracture, implant malposition, asymmetry, contour abnormality, and need for revision surgeries. I also discussed the FDA recommendations for silicone implant rupture screening with MRI.

## 2024-08-30 NOTE — CONSULT LETTER
[Consult Letter:] : I had the pleasure of evaluating your patient, [unfilled]. [Please see my note below.] : Please see my note below. [Consult Closing:] : Thank you very much for allowing me to participate in the care of this patient.  If you have any questions, please do not hesitate to contact me. [Sincerely,] : Sincerely, [FreeTextEntry2] : Vida Austin MD [address] [place] [address] 8th [place] [address] [FreeTextEntry3] : Oren Lerman, MD, FACS Cosmetic & Reconstructive Plastic Surgery Associate , Department of Plastic Surgery - [facility] Associate Professor of Surgery - [facility] of [facility] at [facility] Tel: [phone] Fax: [phone] [email or web address]

## 2024-09-04 ENCOUNTER — APPOINTMENT (OUTPATIENT)
Dept: HEMATOLOGY ONCOLOGY | Facility: CLINIC | Age: 54
End: 2024-09-04
Payer: COMMERCIAL

## 2024-09-04 VITALS
OXYGEN SATURATION: 98 % | DIASTOLIC BLOOD PRESSURE: 89 MMHG | WEIGHT: 129 LBS | BODY MASS INDEX: 24.35 KG/M2 | HEIGHT: 61 IN | TEMPERATURE: 98.1 F | RESPIRATION RATE: 18 BRPM | HEART RATE: 98 BPM | SYSTOLIC BLOOD PRESSURE: 138 MMHG

## 2024-09-04 DIAGNOSIS — T45.1X5D ADVERSE EFFECT OF ANTINEOPLASTIC AND IMMUNOSUPPRESSIVE DRUGS, SUBSEQUENT ENCOUNTER: ICD-10-CM

## 2024-09-04 DIAGNOSIS — C77.9 MALIGNANT NEOPLASM OF UNSPECIFIED SITE OF UNSPECIFIED FEMALE BREAST: ICD-10-CM

## 2024-09-04 DIAGNOSIS — C50.919 MALIGNANT NEOPLASM OF UNSPECIFIED SITE OF UNSPECIFIED FEMALE BREAST: ICD-10-CM

## 2024-09-04 LAB
ALBUMIN SERPL ELPH-MCNC: 4.2 G/DL
ALP BLD-CCNC: 44 U/L
ALT SERPL-CCNC: 30 U/L
ANION GAP SERPL CALC-SCNC: -1 MMOL/L
AST SERPL-CCNC: 36 U/L
BILIRUB SERPL-MCNC: 0.5 MG/DL
BUN SERPL-MCNC: 12 MG/DL
CALCIUM SERPL-MCNC: 9.9 MG/DL
CHLORIDE SERPL-SCNC: 111 MMOL/L
CO2 SERPL-SCNC: 27 MMOL/L
CREAT SERPL-MCNC: 0.7 MG/DL
EGFR: 103 ML/MIN/1.73M2
ESTIMATED AVERAGE GLUCOSE: 100 MG/DL
GLUCOSE SERPL-MCNC: 119 MG/DL
HBA1C MFR BLD HPLC: 5.1 %
HCT VFR BLD CALC: 33.1 %
HGB BLD-MCNC: 10.8 G/DL
LYMPHOCYTES # BLD AUTO: 1 K/UL
LYMPHOCYTES NFR BLD AUTO: 27.5 %
MAN DIFF?: NO
MCHC RBC-ENTMCNC: 32 PG
MCHC RBC-ENTMCNC: 32.6 GM/DL
MCV RBC AUTO: 97.9 FL
NEUTROPHILS # BLD AUTO: 2.3 K/UL
NEUTROPHILS NFR BLD AUTO: 62 %
PLATELET # BLD AUTO: 86 K/UL
POTASSIUM SERPL-SCNC: 4 MMOL/L
PROT SERPL-MCNC: 7.2 G/DL
RBC # BLD: 3.38 M/UL
RBC # FLD: 14 %
SODIUM SERPL-SCNC: 137 MMOL/L
WBC # FLD AUTO: 3.7 K/UL

## 2024-09-04 PROCEDURE — G2211 COMPLEX E/M VISIT ADD ON: CPT | Mod: NC

## 2024-09-04 PROCEDURE — 99214 OFFICE O/P EST MOD 30 MIN: CPT

## 2024-09-04 NOTE — PHYSICAL EXAM
[Restricted in physically strenuous activity but ambulatory and able to carry out work of a light or sedentary nature] : Status 1- Restricted in physically strenuous activity but ambulatory and able to carry out work of a light or sedentary nature, e.g., light house work, office work [Thin] : thin [Normal] : affect appropriate [de-identified] : rt breast 1 cm uoq, lt unremarkable no palp LN [de-identified] : alopecia

## 2024-09-04 NOTE — HISTORY OF PRESENT ILLNESS
[Disease: _____________________] : Disease: [unfilled] [de-identified] : 53 year old female with newly diagnosed multifocal triple negative right breast cancer is referred by Dr. Will Trejo to discuss neoadjuvant systemic therapy. Here today for f/u and tx.   Patient palpated a lump in right breast.  2/21/24 R breast US: New 3 mm hypoechoic mass right 6:00 3 cm from the nipple. New 1 cm hypoechoic mass with indistinct margins right 9:00 4 cm from the nipple . 1.5 x 0.9 x 1.7 cm irregular hypoechoic mass right 10:00 4 cm from the nipple underlying region of palpable mass.  New 1.9 x 0.4 cm irregular hypoechoic mass right 10:00 5 cm from the nipple underlying region of palpable mass. New 1.6 x 1.2 x 1.8 cm irregular hypoechoic mass with cystic component right 10:00 6 cm from the nipple underlying region of palpable mass. Lymph nodes with thickened cortex right axillary region.  3/5/24 b/l dx MG BREAST COMPOSITION:  The breasts are extremely dense, which lowers the sensitivity of mammography.  Spot compression views of the upper outer right breast do not demonstrate any distinct mammographic abnormalities, probably due to the dense nature of the patient's breast. No suspicious masses, architectural distortion, or significant calcifications are detected in the left breast. b/l US: - 6 o'clock axis, 3 cm from the nipple, 0.3 x 0.3 x 0.4 cm ovoid circumscribed hypoechoic mass -9 o'clock axis, 4 cm from the nipple, 1.0 x 0.6 x 0.5 cm ovoid parallel hypoechoic mass with irregular margins. Ultrasound-guided biopsy is recommended. -10 o'clock axis, 4 cm from the nipple, 1.6 x 0.9 x 1.7 cm ovoid parallel hypoechoic mass with irregular margins,  -10 o'clock axis, 4 cm from the nipple, 1.4 x 0.8 x 1.5 cm ovoid parallel hypoechoic mass with irregular margins -10 o'clock axis, 5 cm from the nipple, 2.5 x 0.7 x 1.4 cm irregular hypoechoic mass -10 o'clock axis, 6 cm from the nipple, 2.6 x 1.3 x 1.9 cm irregular hypoechoic mass. -The irregular hypoechoic masses at the 10 o'clock axis, spanning the 4 to 6 cm from the nipple region, appear to be contiguous with one another and can be biopsied simultaneously. -Abnormally thickened right axillary lymph nodes, with the most prominent measuring 1.9 x 0.9 x 1.7 cm. No suspicious solid or complex cystic mass is detected in the left breast. No morphologically abnormal axillary lymph nodes are detected.   3/5/24 US guided biopsy x 3: 1. Right breast 10 o'clock axis, 4-6 cm from the nipple, core biopsy: -Poorly differentiated invasive ductal carcinoma, measuring 0.9 cm in maximal length in this material  ER 0%, CO 0%, HER2 1+, Ki-67 60% -Ductal carcinoma in situ is also present, intermediate to high nuclear grade, solid pattern with extension into lobules. 2. Right breast 9 o'clock axis, 4 cm from the nipple, core biopsy: -Poorly differentiated invasive ductal carcinoma morphologically similar to specimen 1, measuring 0.5 cm in maximal length in this material. ER 0%, CO 0%, HER2 0, Ki-67 44% -Ductal carcinoma in situ is also present, intermediate to high nuclear grade, solid pattern, with extension into lobules. 3. Right axilla, core biopsy: Metastatic carcinoma involving lymph node tissue and morphologically similar to specimen 1 and 2  3/26/24: MRI B/L Breast: Biopsy-proven right breast malignancy measuring at least 5.5 cm. Possible satellite upper central right breast, 2 closed the chest wall to biopsy. Recommend surgical/oncologic management. Metastatic lymph node in the right axilla. No evidence of left breast malignancy.  3/27/24: PET showed enhancing lesion in R breast c/w her newly diagnosed breast cancer in addition to R axillary LAD. Focal hypermetabolic activity also noted in L colon which warrants GI evaluation at a later date 4/3/24: C1 ddAC + pembrolizumab q6w 4/17/24: C2 ddAC + neulasta onpro 5/1/24: C3 ddAC + neulasta onpro 5/15/24: C4 ddAC + neulasta onpro 5/29/24: C1 carbo/taxol 8/14/24 C12/12 carbo/taxol  [de-identified] : poorly differentiated invasive ductal carcinoma [de-identified] : ER-, NC-, HER2 0-1+, Ki-67 44-60% [de-identified] : Here to continue treatment with weekly carbo/taxol and pembro Q6wks.  Reports a dry cough associated with nasal congestion/sneezing, especially when outside, but also a cough which patient thinks is worse on lying flat.   Denies fever/chills, sob/dobbs, sore throat, chest pain, diarrhea, vomiting, neuropathy, rash.  6/20 Pt called with complaints of frequent loose stools up to 6x/day and fever of up to 101F today. Last chemo treatment 6/19/24. Asked pt to go to the ER; pt expressed verbal understanding and agreement with plan to rule out infection, address infection if present and receive IV hydration as well in ER. Stated will go to [facility] ED today IVF and discharged. Taking immodium 3-4/d  6/26/24 bm stool still loose. 2 lb wt loss, on medical leave , c/o fatigue, no cough , CP or SOB, hot flushes. LMP 2/23/24, occ light headed with change position, no paresthesias, alopecia, no n/v   7/10/24 no diarrhea - changed diet no dairy , low fiber. Trying to inc intake KCal No fever, rash, or recent illness.  No joint pain/swelling/stiffness.  No eye pain/redness/change in vision.  No sores in the mouth or nose.  No difficulty swallowing.  No chest pain or shortness of breath.  No abdominal complaints or weight loss.  No weakness.  No headaches or focal neurological deficits.  No urinary changes.  No other new symptoms. Taking probiotic. Fatigue better. Back and Knee pain better. Not taking gabapentin or meloxicam  7/31/24 appetite better - low fiber, no dairy- no diarrhea. gained 2 lbs no mucositis, no neuropathy. mild fatigue. LMP 2/24. No SE p xarxio. after each rx s/p 9/12 8/14/24 feels stuffy in the morning with expectorated mucus, no GI symptoms, mild fatigue, Mild neuropathy in R thumb which resolves within minutes.Appetite improved Wt stable  9/4/24 -8/16/2024 B/L MG & targeted R US: Heterogeneously dense. R UOQ and lateral aspect significant interval decrease in size and extent of suspected disease (ribbon and heart biopsy clips). R UOQ 10:00 7 CFN persistent irregular mass identified, favored to correspond to residual enhancing mass seen on same-day breast MRI, 3.5 cm from ribbon clip and 4.5 cm from heart clip. US-R 10:00 4 CFN no residual mass, treatment response. R 9:00 4 CFN in the area of prior biopsy-proven malignancy, no residual mass, treatment response. R 10:00 6 CFN 1.3 cm irregular hypoechoic mass with irregular margins with MRI correlate (rec US BX if clinically appropriate). Normal lymph nodes without evidence of adenopathy, significant treatment response. BI-RADS 4 8/26/24 MRI - resolution Rt ax adenopathy. 1.5 cm area enhancement  RT - treatment effect

## 2024-09-04 NOTE — HISTORY OF PRESENT ILLNESS
[Disease: _____________________] : Disease: [unfilled] [de-identified] : 53 year old female with newly diagnosed multifocal triple negative right breast cancer is referred by Dr. Will Trejo to discuss neoadjuvant systemic therapy. Here today for f/u and tx.   Patient palpated a lump in right breast.  2/21/24 R breast US: New 3 mm hypoechoic mass right 6:00 3 cm from the nipple. New 1 cm hypoechoic mass with indistinct margins right 9:00 4 cm from the nipple . 1.5 x 0.9 x 1.7 cm irregular hypoechoic mass right 10:00 4 cm from the nipple underlying region of palpable mass.  New 1.9 x 0.4 cm irregular hypoechoic mass right 10:00 5 cm from the nipple underlying region of palpable mass. New 1.6 x 1.2 x 1.8 cm irregular hypoechoic mass with cystic component right 10:00 6 cm from the nipple underlying region of palpable mass. Lymph nodes with thickened cortex right axillary region.  3/5/24 b/l dx MG BREAST COMPOSITION:  The breasts are extremely dense, which lowers the sensitivity of mammography.  Spot compression views of the upper outer right breast do not demonstrate any distinct mammographic abnormalities, probably due to the dense nature of the patient's breast. No suspicious masses, architectural distortion, or significant calcifications are detected in the left breast. b/l US: - 6 o'clock axis, 3 cm from the nipple, 0.3 x 0.3 x 0.4 cm ovoid circumscribed hypoechoic mass -9 o'clock axis, 4 cm from the nipple, 1.0 x 0.6 x 0.5 cm ovoid parallel hypoechoic mass with irregular margins. Ultrasound-guided biopsy is recommended. -10 o'clock axis, 4 cm from the nipple, 1.6 x 0.9 x 1.7 cm ovoid parallel hypoechoic mass with irregular margins,  -10 o'clock axis, 4 cm from the nipple, 1.4 x 0.8 x 1.5 cm ovoid parallel hypoechoic mass with irregular margins -10 o'clock axis, 5 cm from the nipple, 2.5 x 0.7 x 1.4 cm irregular hypoechoic mass -10 o'clock axis, 6 cm from the nipple, 2.6 x 1.3 x 1.9 cm irregular hypoechoic mass. -The irregular hypoechoic masses at the 10 o'clock axis, spanning the 4 to 6 cm from the nipple region, appear to be contiguous with one another and can be biopsied simultaneously. -Abnormally thickened right axillary lymph nodes, with the most prominent measuring 1.9 x 0.9 x 1.7 cm. No suspicious solid or complex cystic mass is detected in the left breast. No morphologically abnormal axillary lymph nodes are detected.   3/5/24 US guided biopsy x 3: 1. Right breast 10 o'clock axis, 4-6 cm from the nipple, core biopsy: -Poorly differentiated invasive ductal carcinoma, measuring 0.9 cm in maximal length in this material  ER 0%, MI 0%, HER2 1+, Ki-67 60% -Ductal carcinoma in situ is also present, intermediate to high nuclear grade, solid pattern with extension into lobules. 2. Right breast 9 o'clock axis, 4 cm from the nipple, core biopsy: -Poorly differentiated invasive ductal carcinoma morphologically similar to specimen 1, measuring 0.5 cm in maximal length in this material. ER 0%, MI 0%, HER2 0, Ki-67 44% -Ductal carcinoma in situ is also present, intermediate to high nuclear grade, solid pattern, with extension into lobules. 3. Right axilla, core biopsy: Metastatic carcinoma involving lymph node tissue and morphologically similar to specimen 1 and 2  3/26/24: MRI B/L Breast: Biopsy-proven right breast malignancy measuring at least 5.5 cm. Possible satellite upper central right breast, 2 closed the chest wall to biopsy. Recommend surgical/oncologic management. Metastatic lymph node in the right axilla. No evidence of left breast malignancy.  3/27/24: PET showed enhancing lesion in R breast c/w her newly diagnosed breast cancer in addition to R axillary LAD. Focal hypermetabolic activity also noted in L colon which warrants GI evaluation at a later date 4/3/24: C1 ddAC + pembrolizumab q6w 4/17/24: C2 ddAC + neulasta onpro 5/1/24: C3 ddAC + neulasta onpro 5/15/24: C4 ddAC + neulasta onpro 5/29/24: C1 carbo/taxol 8/14/24 C12/12 carbo/taxol  [de-identified] : poorly differentiated invasive ductal carcinoma [de-identified] : ER-, CA-, HER2 0-1+, Ki-67 44-60% [de-identified] : Here to continue treatment with weekly carbo/taxol and pembro Q6wks.  Reports a dry cough associated with nasal congestion/sneezing, especially when outside, but also a cough which patient thinks is worse on lying flat.   Denies fever/chills, sob/dobbs, sore throat, chest pain, diarrhea, vomiting, neuropathy, rash.  6/20 Pt called with complaints of frequent loose stools up to 6x/day and fever of up to 101F today. Last chemo treatment 6/19/24. Asked pt to go to the ER; pt expressed verbal understanding and agreement with plan to rule out infection, address infection if present and receive IV hydration as well in ER. Stated will go to [facility] ED today IVF and discharged. Taking immodium 3-4/d  6/26/24 bm stool still loose. 2 lb wt loss, on medical leave , c/o fatigue, no cough , CP or SOB, hot flushes. LMP 2/23/24, occ light headed with change position, no paresthesias, alopecia, no n/v   7/10/24 no diarrhea - changed diet no dairy , low fiber. Trying to inc intake KCal No fever, rash, or recent illness.  No joint pain/swelling/stiffness.  No eye pain/redness/change in vision.  No sores in the mouth or nose.  No difficulty swallowing.  No chest pain or shortness of breath.  No abdominal complaints or weight loss.  No weakness.  No headaches or focal neurological deficits.  No urinary changes.  No other new symptoms. Taking probiotic. Fatigue better. Back and Knee pain better. Not taking gabapentin or meloxicam  7/31/24 appetite better - low fiber, no dairy- no diarrhea. gained 2 lbs no mucositis, no neuropathy. mild fatigue. LMP 2/24. No SE p xarxio. after each rx s/p 9/12 8/14/24 feels stuffy in the morning with expectorated mucus, no GI symptoms, mild fatigue, Mild neuropathy in R thumb which resolves within minutes.Appetite improved Wt stable  9/4/24 -8/16/2024 B/L MG & targeted R US: Heterogeneously dense. R UOQ and lateral aspect significant interval decrease in size and extent of suspected disease (ribbon and heart biopsy clips). R UOQ 10:00 7 CFN persistent irregular mass identified, favored to correspond to residual enhancing mass seen on same-day breast MRI, 3.5 cm from ribbon clip and 4.5 cm from heart clip. US-R 10:00 4 CFN no residual mass, treatment response. R 9:00 4 CFN in the area of prior biopsy-proven malignancy, no residual mass, treatment response. R 10:00 6 CFN 1.3 cm irregular hypoechoic mass with irregular margins with MRI correlate (rec US BX if clinically appropriate). Normal lymph nodes without evidence of adenopathy, significant treatment response. BI-RADS 4 8/26/24 MRI - resolution Rt ax adenopathy. 1.5 cm area enhancement  RT - treatment effect

## 2024-09-04 NOTE — REVIEW OF SYSTEMS
[Fatigue] : fatigue [Cough] : cough [Diarrhea: Grade 0] : Diarrhea: Grade 0 [Negative] : Allergic/Immunologic [FreeTextEntry2] : no wt loss

## 2024-09-04 NOTE — PHYSICAL EXAM
[Restricted in physically strenuous activity but ambulatory and able to carry out work of a light or sedentary nature] : Status 1- Restricted in physically strenuous activity but ambulatory and able to carry out work of a light or sedentary nature, e.g., light house work, office work [Thin] : thin [Normal] : affect appropriate [de-identified] : rt breast 1 cm uoq, lt unremarkable no palp LN [de-identified] : alopecia

## 2024-09-04 NOTE — ASSESSMENT
[FreeTextEntry1] : 53 year old perimenopausal woman (LMP 10/2023) with newly diagnosed multifocal triple negative right breast cancer S/P neoadjuvant systemic therapy.cT3 cN1 Stage IIIC poorly diff IDC ER 0% AL 0% HER2 renetta 1+ Ki 67 60%  Treated with NACT: ddAC q2wk x 4, pembro q6wk --> TC q1wk x 12 + pembro, Responding on exam and radiology  ECHO 6/11/24 EF nml. Planning mastectomy with reconstruction. Pt aware that further adjuvant Rx pending surgery. F/U after surgery.Check cbc, chem, cortisol, tfts, hgbaic

## 2024-09-04 NOTE — HISTORY OF PRESENT ILLNESS
[Disease: _____________________] : Disease: [unfilled] [de-identified] : 53 year old female with newly diagnosed multifocal triple negative right breast cancer is referred by Dr. Will Trejo to discuss neoadjuvant systemic therapy. Here today for f/u and tx.   Patient palpated a lump in right breast.  2/21/24 R breast US: New 3 mm hypoechoic mass right 6:00 3 cm from the nipple. New 1 cm hypoechoic mass with indistinct margins right 9:00 4 cm from the nipple . 1.5 x 0.9 x 1.7 cm irregular hypoechoic mass right 10:00 4 cm from the nipple underlying region of palpable mass.  New 1.9 x 0.4 cm irregular hypoechoic mass right 10:00 5 cm from the nipple underlying region of palpable mass. New 1.6 x 1.2 x 1.8 cm irregular hypoechoic mass with cystic component right 10:00 6 cm from the nipple underlying region of palpable mass. Lymph nodes with thickened cortex right axillary region.  3/5/24 b/l dx MG BREAST COMPOSITION:  The breasts are extremely dense, which lowers the sensitivity of mammography.  Spot compression views of the upper outer right breast do not demonstrate any distinct mammographic abnormalities, probably due to the dense nature of the patient's breast. No suspicious masses, architectural distortion, or significant calcifications are detected in the left breast. b/l US: - 6 o'clock axis, 3 cm from the nipple, 0.3 x 0.3 x 0.4 cm ovoid circumscribed hypoechoic mass -9 o'clock axis, 4 cm from the nipple, 1.0 x 0.6 x 0.5 cm ovoid parallel hypoechoic mass with irregular margins. Ultrasound-guided biopsy is recommended. -10 o'clock axis, 4 cm from the nipple, 1.6 x 0.9 x 1.7 cm ovoid parallel hypoechoic mass with irregular margins,  -10 o'clock axis, 4 cm from the nipple, 1.4 x 0.8 x 1.5 cm ovoid parallel hypoechoic mass with irregular margins -10 o'clock axis, 5 cm from the nipple, 2.5 x 0.7 x 1.4 cm irregular hypoechoic mass -10 o'clock axis, 6 cm from the nipple, 2.6 x 1.3 x 1.9 cm irregular hypoechoic mass. -The irregular hypoechoic masses at the 10 o'clock axis, spanning the 4 to 6 cm from the nipple region, appear to be contiguous with one another and can be biopsied simultaneously. -Abnormally thickened right axillary lymph nodes, with the most prominent measuring 1.9 x 0.9 x 1.7 cm. No suspicious solid or complex cystic mass is detected in the left breast. No morphologically abnormal axillary lymph nodes are detected.   3/5/24 US guided biopsy x 3: 1. Right breast 10 o'clock axis, 4-6 cm from the nipple, core biopsy: -Poorly differentiated invasive ductal carcinoma, measuring 0.9 cm in maximal length in this material  ER 0%, MD 0%, HER2 1+, Ki-67 60% -Ductal carcinoma in situ is also present, intermediate to high nuclear grade, solid pattern with extension into lobules. 2. Right breast 9 o'clock axis, 4 cm from the nipple, core biopsy: -Poorly differentiated invasive ductal carcinoma morphologically similar to specimen 1, measuring 0.5 cm in maximal length in this material. ER 0%, MD 0%, HER2 0, Ki-67 44% -Ductal carcinoma in situ is also present, intermediate to high nuclear grade, solid pattern, with extension into lobules. 3. Right axilla, core biopsy: Metastatic carcinoma involving lymph node tissue and morphologically similar to specimen 1 and 2  3/26/24: MRI B/L Breast: Biopsy-proven right breast malignancy measuring at least 5.5 cm. Possible satellite upper central right breast, 2 closed the chest wall to biopsy. Recommend surgical/oncologic management. Metastatic lymph node in the right axilla. No evidence of left breast malignancy.  3/27/24: PET showed enhancing lesion in R breast c/w her newly diagnosed breast cancer in addition to R axillary LAD. Focal hypermetabolic activity also noted in L colon which warrants GI evaluation at a later date 4/3/24: C1 ddAC + pembrolizumab q6w 4/17/24: C2 ddAC + neulasta onpro 5/1/24: C3 ddAC + neulasta onpro 5/15/24: C4 ddAC + neulasta onpro 5/29/24: C1 carbo/taxol 8/14/24 C12/12 carbo/taxol  [de-identified] : poorly differentiated invasive ductal carcinoma [de-identified] : ER-, MN-, HER2 0-1+, Ki-67 44-60% [de-identified] : Here to continue treatment with weekly carbo/taxol and pembro Q6wks.  Reports a dry cough associated with nasal congestion/sneezing, especially when outside, but also a cough which patient thinks is worse on lying flat.   Denies fever/chills, sob/dobbs, sore throat, chest pain, diarrhea, vomiting, neuropathy, rash.  6/20 Pt called with complaints of frequent loose stools up to 6x/day and fever of up to 101F today. Last chemo treatment 6/19/24. Asked pt to go to the ER; pt expressed verbal understanding and agreement with plan to rule out infection, address infection if present and receive IV hydration as well in ER. Stated will go to [facility] ED today IVF and discharged. Taking immodium 3-4/d  6/26/24 bm stool still loose. 2 lb wt loss, on medical leave , c/o fatigue, no cough , CP or SOB, hot flushes. LMP 2/23/24, occ light headed with change position, no paresthesias, alopecia, no n/v   7/10/24 no diarrhea - changed diet no dairy , low fiber. Trying to inc intake KCal No fever, rash, or recent illness.  No joint pain/swelling/stiffness.  No eye pain/redness/change in vision.  No sores in the mouth or nose.  No difficulty swallowing.  No chest pain or shortness of breath.  No abdominal complaints or weight loss.  No weakness.  No headaches or focal neurological deficits.  No urinary changes.  No other new symptoms. Taking probiotic. Fatigue better. Back and Knee pain better. Not taking gabapentin or meloxicam  7/31/24 appetite better - low fiber, no dairy- no diarrhea. gained 2 lbs no mucositis, no neuropathy. mild fatigue. LMP 2/24. No SE p xarxio. after each rx s/p 9/12 8/14/24 feels stuffy in the morning with expectorated mucus, no GI symptoms, mild fatigue, Mild neuropathy in R thumb which resolves within minutes.Appetite improved Wt stable  9/4/24 -8/16/2024 B/L MG & targeted R US: Heterogeneously dense. R UOQ and lateral aspect significant interval decrease in size and extent of suspected disease (ribbon and heart biopsy clips). R UOQ 10:00 7 CFN persistent irregular mass identified, favored to correspond to residual enhancing mass seen on same-day breast MRI, 3.5 cm from ribbon clip and 4.5 cm from heart clip. US-R 10:00 4 CFN no residual mass, treatment response. R 9:00 4 CFN in the area of prior biopsy-proven malignancy, no residual mass, treatment response. R 10:00 6 CFN 1.3 cm irregular hypoechoic mass with irregular margins with MRI correlate (rec US BX if clinically appropriate). Normal lymph nodes without evidence of adenopathy, significant treatment response. BI-RADS 4 8/26/24 MRI - resolution Rt ax adenopathy. 1.5 cm area enhancement  RT - treatment effect

## 2024-09-04 NOTE — ASSESSMENT
[FreeTextEntry1] : 53 year old perimenopausal woman (LMP 10/2023) with newly diagnosed multifocal triple negative right breast cancer S/P neoadjuvant systemic therapy.cT3 cN1 Stage IIIC poorly diff IDC ER 0% IL 0% HER2 renetta 1+ Ki 67 60%  Treated with NACT: ddAC q2wk x 4, pembro q6wk --> TC q1wk x 12 + pembro, Responding on exam and radiology  ECHO 6/11/24 EF nml. Planning mastectomy with reconstruction. Pt aware that further adjuvant Rx pending surgery. F/U after surgery.Check cbc, chem, cortisol, tfts, hgbaic

## 2024-09-04 NOTE — ASSESSMENT
[FreeTextEntry1] : 53 year old perimenopausal woman (LMP 10/2023) with newly diagnosed multifocal triple negative right breast cancer S/P neoadjuvant systemic therapy.cT3 cN1 Stage IIIC poorly diff IDC ER 0% MI 0% HER2 renetta 1+ Ki 67 60%  Treated with NACT: ddAC q2wk x 4, pembro q6wk --> TC q1wk x 12 + pembro, Responding on exam and radiology  ECHO 6/11/24 EF nml. Planning mastectomy with reconstruction. Pt aware that further adjuvant Rx pending surgery. F/U after surgery.Check cbc, chem, cortisol, tfts, hgbaic

## 2024-09-04 NOTE — PHYSICAL EXAM
[Restricted in physically strenuous activity but ambulatory and able to carry out work of a light or sedentary nature] : Status 1- Restricted in physically strenuous activity but ambulatory and able to carry out work of a light or sedentary nature, e.g., light house work, office work [Thin] : thin [Normal] : affect appropriate [de-identified] : rt breast 1 cm uoq, lt unremarkable no palp LN [de-identified] : alopecia

## 2024-09-05 LAB
25(OH)D3 SERPL-MCNC: 30 NG/ML
CORTISOL: 9.1 UG/DL
TSH SERPL-ACNC: 0.89 UIU/ML

## 2024-09-13 ENCOUNTER — NON-APPOINTMENT (OUTPATIENT)
Age: 54
End: 2024-09-13

## 2024-09-19 ENCOUNTER — NON-APPOINTMENT (OUTPATIENT)
Age: 54
End: 2024-09-19

## 2024-09-25 ENCOUNTER — APPOINTMENT (OUTPATIENT)
Dept: PLASTIC SURGERY | Facility: CLINIC | Age: 54
End: 2024-09-25
Payer: COMMERCIAL

## 2024-09-25 DIAGNOSIS — Z42.1 ENCOUNTER FOR BREAST RECONSTRUCTION FOLLOWING MASTECTOMY: ICD-10-CM

## 2024-09-25 PROCEDURE — 99213 OFFICE O/P EST LOW 20 MIN: CPT | Mod: 95

## 2024-09-25 RX ORDER — MUPIROCIN 20 MG/G
2 OINTMENT TOPICAL
Qty: 1 | Refills: 0 | Status: ACTIVE | COMMUNITY
Start: 2024-09-25 | End: 1900-01-01

## 2024-09-25 RX ORDER — IBUPROFEN 600 MG/1
600 TABLET, FILM COATED ORAL EVERY 6 HOURS
Qty: 30 | Refills: 0 | Status: ACTIVE | COMMUNITY
Start: 2024-09-25 | End: 1900-01-01

## 2024-09-25 RX ORDER — OXYCODONE 5 MG/1
5 TABLET ORAL
Qty: 12 | Refills: 0 | Status: ACTIVE | COMMUNITY
Start: 2024-09-25 | End: 1900-01-01

## 2024-09-25 NOTE — HISTORY OF PRESENT ILLNESS
[FreeTextEntry1] : Patient Name: CHRIS TELLES : 1970 Date: 2024 Attending: Dr. Oren Lerman   HPI: follow up appointment for right breast reconstruction with tissue expander on 24.   Allergies: NKDA Medication prescribed: bactroban, oxycodone 5 mg, ibuprofen 600 mg   ROS: complete 14 point review of systems negative except pertinent items reviewed in the HPI. Other non-contributory items reviewed in our new patient questionnaire and we have submitted it to be scanned into the medical record.   Physical Exam: completed at time of in office evaluation   Assessment/Plan: We have discussed pre and postop instructions, recovery limitations, restrictions and expectations, ERAS protocol, NPO status, transportation home, postop medications, NAVEEN drains, compression garments, benefits and risks of the procedure. Pre-op labs reviewed. The patient would like to proceed with surgery as scheduled.   CELESTINO العلي NP

## 2024-09-30 ENCOUNTER — APPOINTMENT (OUTPATIENT)
Dept: NUCLEAR MEDICINE | Facility: HOSPITAL | Age: 54
End: 2024-09-30
Payer: COMMERCIAL

## 2024-09-30 ENCOUNTER — OUTPATIENT (OUTPATIENT)
Dept: OUTPATIENT SERVICES | Facility: HOSPITAL | Age: 54
LOS: 1 days | End: 2024-09-30

## 2024-09-30 ENCOUNTER — RESULT REVIEW (OUTPATIENT)
Age: 54
End: 2024-09-30

## 2024-09-30 PROCEDURE — A9541: CPT

## 2024-09-30 PROCEDURE — 78195 LYMPH SYSTEM IMAGING: CPT

## 2024-09-30 PROCEDURE — 78195 LYMPH SYSTEM IMAGING: CPT | Mod: 26

## 2024-09-30 NOTE — ASU PATIENT PROFILE, ADULT - NSICDXPASTMEDICALHX_GEN_ALL_CORE_FT
PAST MEDICAL HISTORY:  Breast cancer     HLD (hyperlipidemia)     HTN (hypertension)     OA (osteoarthritis)

## 2024-09-30 NOTE — ASU PATIENT PROFILE, ADULT - FALL HARM RISK - UNIVERSAL INTERVENTIONS
Bed in lowest position, wheels locked, appropriate side rails in place/Call bell, personal items and telephone in reach/Instruct patient to call for assistance before getting out of bed or chair/Non-slip footwear when patient is out of bed/[place] to call system/Physically safe environment - no spills, clutter or unnecessary equipment/Purposeful Proactive Rounding/Room/bathroom lighting operational, light cord in reach

## 2024-10-01 ENCOUNTER — RESULT REVIEW (OUTPATIENT)
Age: 54
End: 2024-10-01

## 2024-10-01 ENCOUNTER — APPOINTMENT (OUTPATIENT)
Dept: BREAST CENTER | Facility: HOSPITAL | Age: 54
End: 2024-10-01

## 2024-10-01 ENCOUNTER — APPOINTMENT (OUTPATIENT)
Dept: BREAST CENTER | Facility: AMBULATORY SURGERY CENTER | Age: 54
End: 2024-10-01

## 2024-10-01 ENCOUNTER — TRANSCRIPTION ENCOUNTER (OUTPATIENT)
Age: 54
End: 2024-10-01

## 2024-10-01 ENCOUNTER — OUTPATIENT (OUTPATIENT)
Dept: INPATIENT UNIT | Facility: HOSPITAL | Age: 54
LOS: 1 days | Discharge: HOME | End: 2024-10-01
Payer: COMMERCIAL

## 2024-10-01 VITALS
HEIGHT: 64 IN | OXYGEN SATURATION: 100 % | RESPIRATION RATE: 16 BRPM | TEMPERATURE: 98 F | HEART RATE: 82 BPM | WEIGHT: 127.87 LBS | DIASTOLIC BLOOD PRESSURE: 73 MMHG | SYSTOLIC BLOOD PRESSURE: 114 MMHG

## 2024-10-01 VITALS
RESPIRATION RATE: 16 BRPM | SYSTOLIC BLOOD PRESSURE: 125 MMHG | OXYGEN SATURATION: 99 % | HEART RATE: 90 BPM | DIASTOLIC BLOOD PRESSURE: 75 MMHG

## 2024-10-01 PROCEDURE — 88331 PATH CONSLTJ SURG 1 BLK 1SPC: CPT | Mod: 26

## 2024-10-01 PROCEDURE — 88341 IMHCHEM/IMCYTCHM EA ADD ANTB: CPT

## 2024-10-01 PROCEDURE — 86900 BLOOD TYPING SEROLOGIC ABO: CPT

## 2024-10-01 PROCEDURE — 76098 X-RAY EXAM SURGICAL SPECIMEN: CPT

## 2024-10-01 PROCEDURE — 88305 TISSUE EXAM BY PATHOLOGIST: CPT | Mod: 26

## 2024-10-01 PROCEDURE — 86901 BLOOD TYPING SEROLOGIC RH(D): CPT

## 2024-10-01 PROCEDURE — 88342 IMHCHEM/IMCYTCHM 1ST ANTB: CPT | Mod: 26

## 2024-10-01 PROCEDURE — 88342 IMHCHEM/IMCYTCHM 1ST ANTB: CPT

## 2024-10-01 PROCEDURE — 38500 BIOPSY/REMOVAL LYMPH NODES: CPT | Mod: RT

## 2024-10-01 PROCEDURE — 86850 RBC ANTIBODY SCREEN: CPT

## 2024-10-01 PROCEDURE — 19125 EXCISION BREAST LESION: CPT | Mod: RT

## 2024-10-01 PROCEDURE — 88360 TUMOR IMMUNOHISTOCHEM/MANUAL: CPT

## 2024-10-01 PROCEDURE — 88341 IMHCHEM/IMCYTCHM EA ADD ANTB: CPT | Mod: 26

## 2024-10-01 PROCEDURE — 76098 X-RAY EXAM SURGICAL SPECIMEN: CPT | Mod: 26

## 2024-10-01 PROCEDURE — 88331 PATH CONSLTJ SURG 1 BLK 1SPC: CPT

## 2024-10-01 PROCEDURE — 88307 TISSUE EXAM BY PATHOLOGIST: CPT | Mod: 26

## 2024-10-01 PROCEDURE — 19303 MAST SIMPLE COMPLETE: CPT | Mod: RT

## 2024-10-01 PROCEDURE — 88360 TUMOR IMMUNOHISTOCHEM/MANUAL: CPT | Mod: 26,59

## 2024-10-01 PROCEDURE — 88305 TISSUE EXAM BY PATHOLOGIST: CPT

## 2024-10-01 PROCEDURE — 88307 TISSUE EXAM BY PATHOLOGIST: CPT

## 2024-10-01 PROCEDURE — 38900 IO MAP OF SENT LYMPH NODE: CPT | Mod: RT

## 2024-10-01 PROCEDURE — C1789: CPT

## 2024-10-01 PROCEDURE — 19357 TISS XPNDR PLMT BRST RCNSTJ: CPT | Mod: RT

## 2024-10-01 PROCEDURE — 38525 BIOPSY/REMOVAL LYMPH NODES: CPT | Mod: RT

## 2024-10-01 DEVICE — IMPLANTABLE DEVICE: Type: IMPLANTABLE DEVICE | Site: RIGHT BREAST | Status: FUNCTIONAL

## 2024-10-01 RX ORDER — MELOXICAM 7.5 MG/1
1 TABLET ORAL
Refills: 0 | DISCHARGE

## 2024-10-01 RX ORDER — HYDROMORPHONE HYDROCHLORIDE 1 MG/ML
0.2 INJECTION, SOLUTION INTRAMUSCULAR; INTRAVENOUS; SUBCUTANEOUS
Refills: 0 | Status: DISCONTINUED | OUTPATIENT
Start: 2024-10-01 | End: 2024-10-01

## 2024-10-01 RX ORDER — ONDANSETRON HCL/PF 4 MG/2 ML
4 VIAL (ML) INJECTION EVERY 6 HOURS
Refills: 0 | Status: DISCONTINUED | OUTPATIENT
Start: 2024-10-01 | End: 2024-10-01

## 2024-10-01 RX ORDER — GABAPENTIN 800 MG/1
300 TABLET, FILM COATED ORAL ONCE
Refills: 0 | Status: COMPLETED | OUTPATIENT
Start: 2024-10-01 | End: 2024-10-01

## 2024-10-01 RX ORDER — GABAPENTIN 800 MG/1
1 TABLET, FILM COATED ORAL
Refills: 0 | DISCHARGE

## 2024-10-01 RX ORDER — SODIUM CHLORIDE IRRIG SOLUTION 0.9 %
1000 SOLUTION, IRRIGATION IRRIGATION
Refills: 0 | Status: DISCONTINUED | OUTPATIENT
Start: 2024-10-01 | End: 2024-10-01

## 2024-10-01 RX ORDER — APREPITANT 125MG-80MG
40 KIT ORAL ONCE
Refills: 0 | Status: COMPLETED | OUTPATIENT
Start: 2024-10-01 | End: 2024-10-01

## 2024-10-01 RX ORDER — AMLODIPINE BESYLATE 5 MG
1 TABLET ORAL
Refills: 0 | DISCHARGE

## 2024-10-01 RX ORDER — ROSUVASTATIN CALCIUM 20 MG/1
1 TABLET, COATED ORAL
Refills: 0 | DISCHARGE

## 2024-10-01 RX ORDER — CELECOXIB 200 MG/1
400 CAPSULE ORAL ONCE
Refills: 0 | Status: COMPLETED | OUTPATIENT
Start: 2024-10-01 | End: 2024-10-01

## 2024-10-01 RX ORDER — ACETAMINOPHEN 325 MG
1000 TABLET ORAL ONCE
Refills: 0 | Status: COMPLETED | OUTPATIENT
Start: 2024-10-01 | End: 2024-10-01

## 2024-10-01 RX ORDER — TIZANIDINE HYDROCHLORIDE 6 MG/1
2 CAPSULE ORAL
Refills: 0 | DISCHARGE

## 2024-10-01 RX ADMIN — CELECOXIB 400 MILLIGRAM(S): 200 CAPSULE ORAL at 09:19

## 2024-10-01 RX ADMIN — GABAPENTIN 300 MILLIGRAM(S): 800 TABLET, FILM COATED ORAL at 09:17

## 2024-10-01 RX ADMIN — APREPITANT 40 MILLIGRAM(S): KIT at 09:16

## 2024-10-01 RX ADMIN — Medication 1000 MILLIGRAM(S): at 09:17

## 2024-10-01 NOTE — BRIEF OPERATIVE NOTE - NSICDXBRIEFPROCEDURE_GEN_ALL_CORE_FT
PROCEDURES:  Mastectomy with axillary node dissection 01-Oct-2024 13:37:28 right Juan Manuel Drummond

## 2024-10-01 NOTE — PROGRESS NOTE ADULT - SUBJECTIVE AND OBJECTIVE BOX
Procedure: R partial mastectomy, SLND, tissue expander  Surgeon: Dr. Will Trejo    S: Pt has no complaints. Denies CP, SOB, DEL TORO, calf tenderness. Pain controlled with medication. Mildly drowsy.    O:  T(C): 36.7 (10-01-24 @ 16:56), Max: 36.7 (10-01-24 @ 14:56)  T(F): 98 (10-01-24 @ 16:56), Max: 98 (10-01-24 @ 14:56)  HR: 86 (10-01-24 @ 16:56) (84 - 90)  BP: 120/75 (10-01-24 @ 16:56) (97/56 - 134/78)  RR: 14 (10-01-24 @ 16:56) (14 - 17)  SpO2: 100% (10-01-24 @ 16:56) (95% - 100%)  Wt(kg): --            Gen: NAD, resting comfortably in bed  C/V: NSR  Pulm: Nonlabored breathing, no respiratory distress  Breast: R transverse incision CDI, mild TTP. No significant swelling along R axilla. NAVEEN drain at mid-axillary line with minimal serosanguinous output  Extrem: WWP, no calf edema, SCDs in place

## 2024-10-01 NOTE — BRIEF OPERATIVE NOTE - OPERATION/FINDINGS
Magseed was localized. Right breast was prepped and draped in the sterile fashion. A right periareolar incision was made, through which the breast tissue was dissected down to the pectoralis fascia. Before total excision of the mastectomy specimen, mag seed localization was confirmed. Then, the right axillary lymph node containing magseed clip was removed, followed by removal of two sentinal lymph nodes. Following the mastectomy, plastic surgery with Dr. Lerman performed a reconstruction with tissue expansion.

## 2024-10-01 NOTE — PROGRESS NOTE ADULT - ASSESSMENT
A/P: 53yFemale s/p above procedure  Diet: regular  Pain/nausea control  Home rx from plastic surgery team  Postop visit with plastic surgery team for incision check  Dispo plan: discharge home

## 2024-10-01 NOTE — ASU DISCHARGE PLAN (ADULT/PEDIATRIC) - ASU DC SPECIAL INSTRUCTIONSFT
Keep your dressings dry, and keep them on until seen at your schedule follow-up appointment at the plastic surgery office    Take your medications as prescribed by the plastic surgery office.

## 2024-10-01 NOTE — ASU DISCHARGE PLAN (ADULT/PEDIATRIC) - PROCEDURE
Right mastectomy, axillary lymph node dissection, sentinel node biopsy, and reconstruction with tissue expander

## 2024-10-01 NOTE — BRIEF OPERATIVE NOTE - SPECIMENS
Right breast mastectomy, tight axillary lymph node with magseed, right sentinal lymph node (hot) #1, right sentinal lymph node (hot) #2

## 2024-10-01 NOTE — ASU DISCHARGE PLAN (ADULT/PEDIATRIC) - COMMENTS
Please call the office of Dr. Will Trejo to schedule and confirm your follow-up appointment     Please call the office of Dr. Lerman (plastic surgery) to schedule and confirm your follow-up appointment

## 2024-10-01 NOTE — BRIEF OPERATIVE NOTE - NSICDXBRIEFPREOP_GEN_ALL_CORE_FT
PRE-OP DIAGNOSIS:  Breast cancer, right 01-Oct-2024 13:35:35 cT3 cN1 Cyphers, Juan Manuel  Status post partial mastectomy, right 01-Oct-2024 13:36:26  Cyphers, Juan Manuel

## 2024-10-01 NOTE — ASU DISCHARGE PLAN (ADULT/PEDIATRIC) - CARE PROVIDER_API CALL
Vida Milton  Surgery  [address] [facility] [address]  [place] [address]  Phone: [phone]  Fax: [phone]  Follow Up Time:     Lerman, Oren Zvi  Plastic Surgery  [address] [facility], [address]  [place] [address]  Phone: [phone]  Fax: [phone]  Follow Up Time:

## 2024-10-07 LAB — SURGICAL PATHOLOGY STUDY: SIGNIFICANT CHANGE UP

## 2024-10-08 ENCOUNTER — APPOINTMENT (OUTPATIENT)
Dept: PLASTIC SURGERY | Facility: CLINIC | Age: 54
End: 2024-10-08
Payer: COMMERCIAL

## 2024-10-08 DIAGNOSIS — Z42.1 ENCOUNTER FOR BREAST RECONSTRUCTION FOLLOWING MASTECTOMY: ICD-10-CM

## 2024-10-08 PROCEDURE — 99024 POSTOP FOLLOW-UP VISIT: CPT

## 2024-10-11 PROBLEM — E78.5 HYPERLIPIDEMIA, UNSPECIFIED: Chronic | Status: ACTIVE | Noted: 2024-09-30

## 2024-10-11 PROBLEM — M19.90 UNSPECIFIED OSTEOARTHRITIS, UNSPECIFIED SITE: Chronic | Status: ACTIVE | Noted: 2024-09-30

## 2024-10-11 PROBLEM — I10 ESSENTIAL (PRIMARY) HYPERTENSION: Chronic | Status: ACTIVE | Noted: 2024-09-30

## 2024-10-14 ENCOUNTER — APPOINTMENT (OUTPATIENT)
Dept: BREAST CENTER | Facility: CLINIC | Age: 54
End: 2024-10-14
Payer: COMMERCIAL

## 2024-10-14 VITALS
DIASTOLIC BLOOD PRESSURE: 74 MMHG | BODY MASS INDEX: 23.79 KG/M2 | HEART RATE: 80 BPM | SYSTOLIC BLOOD PRESSURE: 119 MMHG | WEIGHT: 126 LBS | HEIGHT: 61 IN

## 2024-10-14 DIAGNOSIS — C50.911 MALIGNANT NEOPLASM OF UNSPECIFIED SITE OF RIGHT FEMALE BREAST: ICD-10-CM

## 2024-10-14 PROCEDURE — 99024 POSTOP FOLLOW-UP VISIT: CPT

## 2024-10-22 ENCOUNTER — APPOINTMENT (OUTPATIENT)
Dept: HEMATOLOGY ONCOLOGY | Facility: CLINIC | Age: 54
End: 2024-10-22
Payer: COMMERCIAL

## 2024-10-22 ENCOUNTER — NON-APPOINTMENT (OUTPATIENT)
Age: 54
End: 2024-10-22

## 2024-10-22 VITALS
OXYGEN SATURATION: 99 % | DIASTOLIC BLOOD PRESSURE: 74 MMHG | BODY MASS INDEX: 21.34 KG/M2 | RESPIRATION RATE: 18 BRPM | WEIGHT: 125 LBS | TEMPERATURE: 99.2 F | HEART RATE: 76 BPM | SYSTOLIC BLOOD PRESSURE: 112 MMHG | HEIGHT: 64 IN

## 2024-10-22 DIAGNOSIS — C50.919 MALIGNANT NEOPLASM OF UNSPECIFIED SITE OF UNSPECIFIED FEMALE BREAST: ICD-10-CM

## 2024-10-22 DIAGNOSIS — I10 ESSENTIAL (PRIMARY) HYPERTENSION: ICD-10-CM

## 2024-10-22 DIAGNOSIS — L65.9 NONSCARRING HAIR LOSS, UNSPECIFIED: ICD-10-CM

## 2024-10-22 DIAGNOSIS — Z17.421 MALIGNANT NEOPLASM OF UNSPECIFIED SITE OF UNSPECIFIED FEMALE BREAST: ICD-10-CM

## 2024-10-22 DIAGNOSIS — C77.9 MALIGNANT NEOPLASM OF UNSPECIFIED SITE OF UNSPECIFIED FEMALE BREAST: ICD-10-CM

## 2024-10-22 LAB
ALBUMIN SERPL ELPH-MCNC: 3.9 G/DL
ALP BLD-CCNC: 49 U/L
ALT SERPL-CCNC: 17 U/L
ANION GAP SERPL CALC-SCNC: 3 MMOL/L
AST SERPL-CCNC: 25 U/L
BILIRUB SERPL-MCNC: 0.6 MG/DL
BUN SERPL-MCNC: 11 MG/DL
CALCIUM SERPL-MCNC: 10.2 MG/DL
CHLORIDE SERPL-SCNC: 110 MMOL/L
CO2 SERPL-SCNC: 28 MMOL/L
CREAT SERPL-MCNC: 0.7 MG/DL
EGFR: 103 ML/MIN/1.73M2
ESTIMATED AVERAGE GLUCOSE: 100 MG/DL
GLUCOSE SERPL-MCNC: 109 MG/DL
HBA1C MFR BLD HPLC: 5.1 %
HCT VFR BLD CALC: 31.8 %
HGB BLD-MCNC: 10.4 G/DL
LYMPHOCYTES # BLD AUTO: 0.9 K/UL
LYMPHOCYTES NFR BLD AUTO: 19.4 %
MAN DIFF?: NO
MCHC RBC-ENTMCNC: 31.3 PG
MCHC RBC-ENTMCNC: 32.7 GM/DL
MCV RBC AUTO: 95.8 FL
NEUTROPHILS # BLD AUTO: 3.2 K/UL
NEUTROPHILS NFR BLD AUTO: 66.8 %
PLATELET # BLD AUTO: 197 K/UL
POTASSIUM SERPL-SCNC: 4 MMOL/L
PROT SERPL-MCNC: 7 G/DL
RBC # BLD: 3.32 M/UL
RBC # FLD: 12.3 %
SODIUM SERPL-SCNC: 141 MMOL/L
WBC # FLD AUTO: 4.7 K/UL

## 2024-10-22 PROCEDURE — 99214 OFFICE O/P EST MOD 30 MIN: CPT

## 2024-10-23 PROBLEM — I10 HYPERTENSION, UNSPECIFIED TYPE: Status: ACTIVE | Noted: 2024-10-23

## 2024-10-23 PROBLEM — C50.919 TRIPLE NEGATIVE BREAST CARCINOMA: Status: ACTIVE | Noted: 2024-10-23

## 2024-10-23 LAB
CORTISOL: 7.7 UG/DL
TSH SERPL-ACNC: 0.88 UIU/ML

## 2024-10-28 ENCOUNTER — APPOINTMENT (OUTPATIENT)
Dept: PLASTIC SURGERY | Facility: CLINIC | Age: 54
End: 2024-10-28
Payer: COMMERCIAL

## 2024-10-28 DIAGNOSIS — C50.911 MALIGNANT NEOPLASM OF UNSPECIFIED SITE OF RIGHT FEMALE BREAST: ICD-10-CM

## 2024-10-28 DIAGNOSIS — Z42.1 ENCOUNTER FOR BREAST RECONSTRUCTION FOLLOWING MASTECTOMY: ICD-10-CM

## 2024-10-28 PROCEDURE — 99024 POSTOP FOLLOW-UP VISIT: CPT

## 2024-10-29 ENCOUNTER — NON-APPOINTMENT (OUTPATIENT)
Age: 54
End: 2024-10-29

## 2024-10-29 ENCOUNTER — APPOINTMENT (OUTPATIENT)
Dept: RADIATION ONCOLOGY | Facility: CLINIC | Age: 54
End: 2024-10-29
Payer: COMMERCIAL

## 2024-10-29 VITALS
SYSTOLIC BLOOD PRESSURE: 109 MMHG | DIASTOLIC BLOOD PRESSURE: 75 MMHG | TEMPERATURE: 97.8 F | OXYGEN SATURATION: 100 % | HEART RATE: 73 BPM | RESPIRATION RATE: 16 BRPM

## 2024-10-29 PROCEDURE — 99204 OFFICE O/P NEW MOD 45 MIN: CPT

## 2024-10-29 RX ORDER — MOMETASONE FUROATE 1 MG/G
0.1 OINTMENT TOPICAL TWICE DAILY
Qty: 1 | Refills: 2 | Status: ACTIVE | COMMUNITY
Start: 2024-10-29 | End: 1900-01-01

## 2024-10-30 ENCOUNTER — NON-APPOINTMENT (OUTPATIENT)
Age: 54
End: 2024-10-30

## 2024-10-31 ENCOUNTER — APPOINTMENT (OUTPATIENT)
Dept: INFUSION THERAPY | Facility: CLINIC | Age: 54
End: 2024-10-31

## 2024-10-31 ENCOUNTER — OUTPATIENT (OUTPATIENT)
Dept: OUTPATIENT SERVICES | Facility: HOSPITAL | Age: 54
LOS: 1 days | End: 2024-10-31
Payer: COMMERCIAL

## 2024-10-31 ENCOUNTER — NON-APPOINTMENT (OUTPATIENT)
Age: 54
End: 2024-10-31

## 2024-10-31 VITALS
HEART RATE: 84 BPM | TEMPERATURE: 98 F | OXYGEN SATURATION: 100 % | RESPIRATION RATE: 20 BRPM | SYSTOLIC BLOOD PRESSURE: 114 MMHG | DIASTOLIC BLOOD PRESSURE: 71 MMHG

## 2024-10-31 VITALS
WEIGHT: 126.1 LBS | HEIGHT: 64 IN | HEART RATE: 70 BPM | DIASTOLIC BLOOD PRESSURE: 67 MMHG | SYSTOLIC BLOOD PRESSURE: 106 MMHG | OXYGEN SATURATION: 100 % | TEMPERATURE: 98 F | RESPIRATION RATE: 20 BRPM

## 2024-10-31 DIAGNOSIS — C50.911 MALIGNANT NEOPLASM OF UNSPECIFIED SITE OF RIGHT FEMALE BREAST: ICD-10-CM

## 2024-10-31 LAB
A1C WITH ESTIMATED AVERAGE GLUCOSE RESULT: 5.2 % — SIGNIFICANT CHANGE UP (ref 4–5.6)
ALBUMIN SERPL ELPH-MCNC: 3.9 G/DL — SIGNIFICANT CHANGE UP (ref 3.3–5)
ALP SERPL-CCNC: 58 U/L — SIGNIFICANT CHANGE UP (ref 40–120)
ALT FLD-CCNC: 29 U/L — SIGNIFICANT CHANGE UP (ref 10–45)
ANION GAP SERPL CALC-SCNC: 4 MMOL/L — LOW (ref 5–17)
AST SERPL-CCNC: 39 U/L — SIGNIFICANT CHANGE UP (ref 10–40)
BILIRUB SERPL-MCNC: 0.7 MG/DL — SIGNIFICANT CHANGE UP (ref 0.2–1.2)
BUN SERPL-MCNC: 9 MG/DL — SIGNIFICANT CHANGE UP (ref 7–23)
CALCIUM SERPL-MCNC: 10.1 MG/DL — SIGNIFICANT CHANGE UP (ref 8.4–10.5)
CHLORIDE SERPL-SCNC: 108 MMOL/L — SIGNIFICANT CHANGE UP (ref 96–108)
CO2 SERPL-SCNC: 28 MMOL/L — SIGNIFICANT CHANGE UP (ref 22–31)
CORTIS AM PEAK SERPL-MCNC: 9.82 UG/DL — SIGNIFICANT CHANGE UP (ref 6.02–18.4)
CREAT SERPL-MCNC: 0.5 MG/DL — SIGNIFICANT CHANGE UP (ref 0.5–1.3)
EGFR: 112 ML/MIN/1.73M2 — SIGNIFICANT CHANGE UP
ESTIMATED AVERAGE GLUCOSE: 103 MG/DL — SIGNIFICANT CHANGE UP (ref 68–114)
GLUCOSE SERPL-MCNC: 117 MG/DL — HIGH (ref 70–99)
HCT VFR BLD CALC: 29.9 % — LOW (ref 34.5–45)
HGB BLD-MCNC: 10 G/DL — LOW (ref 11.5–15.5)
LYMPHOCYTES # BLD AUTO: 1 K/UL — SIGNIFICANT CHANGE UP (ref 1–3.3)
LYMPHOCYTES # BLD AUTO: 25.3 % — SIGNIFICANT CHANGE UP (ref 13–44)
MCHC RBC-ENTMCNC: 31.7 PG — SIGNIFICANT CHANGE UP (ref 27–34)
MCHC RBC-ENTMCNC: 33.4 G/DL — SIGNIFICANT CHANGE UP (ref 32–36)
MCV RBC AUTO: 94.9 FL — SIGNIFICANT CHANGE UP (ref 80–100)
NEUTROPHILS # BLD AUTO: 2.2 K/UL — SIGNIFICANT CHANGE UP (ref 1.8–7.4)
NEUTROPHILS NFR BLD AUTO: 58 % — SIGNIFICANT CHANGE UP (ref 43–77)
PLATELET # BLD AUTO: 183 K/UL — SIGNIFICANT CHANGE UP (ref 150–400)
POTASSIUM SERPL-MCNC: 4 MMOL/L — SIGNIFICANT CHANGE UP (ref 3.5–5.3)
POTASSIUM SERPL-SCNC: 4 MMOL/L — SIGNIFICANT CHANGE UP (ref 3.5–5.3)
PROT SERPL-MCNC: 7 G/DL — SIGNIFICANT CHANGE UP (ref 6–8.3)
RBC # BLD: 3.15 M/UL — LOW (ref 3.8–5.2)
RBC # FLD: 12.2 % — SIGNIFICANT CHANGE UP (ref 10.3–14.5)
SODIUM SERPL-SCNC: 140 MMOL/L — SIGNIFICANT CHANGE UP (ref 135–145)
TSH SERPL-MCNC: 1.23 UIU/ML — SIGNIFICANT CHANGE UP (ref 0.27–4.2)
WBC # BLD: 3.9 K/UL — SIGNIFICANT CHANGE UP (ref 3.8–10.5)
WBC # FLD AUTO: 3.9 K/UL — SIGNIFICANT CHANGE UP (ref 3.8–10.5)

## 2024-10-31 PROCEDURE — 85025 COMPLETE CBC W/AUTO DIFF WBC: CPT

## 2024-10-31 PROCEDURE — 82533 TOTAL CORTISOL: CPT

## 2024-10-31 PROCEDURE — 36415 COLL VENOUS BLD VENIPUNCTURE: CPT

## 2024-10-31 PROCEDURE — 96413 CHEMO IV INFUSION 1 HR: CPT

## 2024-10-31 PROCEDURE — 96361 HYDRATE IV INFUSION ADD-ON: CPT

## 2024-10-31 PROCEDURE — 83036 HEMOGLOBIN GLYCOSYLATED A1C: CPT

## 2024-10-31 PROCEDURE — 80053 COMPREHEN METABOLIC PANEL: CPT

## 2024-10-31 PROCEDURE — 84443 ASSAY THYROID STIM HORMONE: CPT

## 2024-10-31 RX ORDER — SODIUM CHLORIDE 9 MG/ML
500 INJECTION, SOLUTION INTRAMUSCULAR; INTRAVENOUS; SUBCUTANEOUS ONCE
Refills: 0 | Status: COMPLETED | OUTPATIENT
Start: 2024-10-31 | End: 2024-10-31

## 2024-10-31 RX ORDER — PEMBROLIZUMAB 25 MG/ML
400 INJECTION, SOLUTION INTRAVENOUS ONCE
Refills: 0 | Status: COMPLETED | OUTPATIENT
Start: 2024-10-31 | End: 2024-10-31

## 2024-10-31 RX ORDER — SODIUM CHLORIDE 9 MG/ML
10 INJECTION, SOLUTION INTRAMUSCULAR; INTRAVENOUS; SUBCUTANEOUS ONCE
Refills: 0 | Status: COMPLETED | OUTPATIENT
Start: 2024-10-31 | End: 2024-10-31

## 2024-10-31 RX ORDER — LIDOCAINE HYDROCHLORIDE 40 MG/ML
1 SOLUTION TOPICAL ONCE
Refills: 0 | Status: COMPLETED | OUTPATIENT
Start: 2024-10-31 | End: 2024-10-31

## 2024-10-31 RX ADMIN — SODIUM CHLORIDE 10 MILLILITER(S): 9 INJECTION, SOLUTION INTRAMUSCULAR; INTRAVENOUS; SUBCUTANEOUS at 11:35

## 2024-10-31 RX ADMIN — PEMBROLIZUMAB 400 MILLIGRAM(S): 25 INJECTION, SOLUTION INTRAVENOUS at 11:01

## 2024-10-31 RX ADMIN — LIDOCAINE HYDROCHLORIDE 1 APPLICATION(S): 40 SOLUTION TOPICAL at 11:01

## 2024-10-31 RX ADMIN — SODIUM CHLORIDE 500 MILLILITER(S): 9 INJECTION, SOLUTION INTRAMUSCULAR; INTRAVENOUS; SUBCUTANEOUS at 11:35

## 2024-10-31 RX ADMIN — PEMBROLIZUMAB 400 MILLIGRAM(S): 25 INJECTION, SOLUTION INTRAVENOUS at 10:25

## 2024-10-31 RX ADMIN — SODIUM CHLORIDE 500 MILLILITER(S): 9 INJECTION, SOLUTION INTRAMUSCULAR; INTRAVENOUS; SUBCUTANEOUS at 11:00

## 2024-11-12 ENCOUNTER — APPOINTMENT (OUTPATIENT)
Dept: PLASTIC SURGERY | Facility: CLINIC | Age: 54
End: 2024-11-12
Payer: COMMERCIAL

## 2024-11-12 DIAGNOSIS — C50.911 MALIGNANT NEOPLASM OF UNSPECIFIED SITE OF RIGHT FEMALE BREAST: ICD-10-CM

## 2024-11-12 PROCEDURE — 99024 POSTOP FOLLOW-UP VISIT: CPT

## 2024-11-12 RX ORDER — DIAZEPAM 5 MG/1
5 TABLET ORAL
Qty: 10 | Refills: 0 | Status: ACTIVE | COMMUNITY
Start: 2024-11-12 | End: 1900-01-01

## 2024-12-02 ENCOUNTER — APPOINTMENT (OUTPATIENT)
Dept: RADIOLOGY | Facility: HOSPITAL | Age: 54
End: 2024-12-02
Payer: COMMERCIAL

## 2024-12-02 ENCOUNTER — OUTPATIENT (OUTPATIENT)
Dept: OUTPATIENT SERVICES | Facility: HOSPITAL | Age: 54
LOS: 1 days | End: 2024-12-02

## 2024-12-02 PROCEDURE — 77085 DXA BONE DENSITY AXL VRT FX: CPT

## 2024-12-02 PROCEDURE — 77085 DXA BONE DENSITY AXL VRT FX: CPT | Mod: 26

## 2024-12-05 ENCOUNTER — APPOINTMENT (OUTPATIENT)
Dept: HEMATOLOGY ONCOLOGY | Facility: CLINIC | Age: 54
End: 2024-12-05
Payer: COMMERCIAL

## 2024-12-05 VITALS
RESPIRATION RATE: 18 BRPM | HEIGHT: 64 IN | HEART RATE: 78 BPM | OXYGEN SATURATION: 95 % | TEMPERATURE: 97.5 F | WEIGHT: 123 LBS | DIASTOLIC BLOOD PRESSURE: 82 MMHG | SYSTOLIC BLOOD PRESSURE: 116 MMHG | BODY MASS INDEX: 21 KG/M2

## 2024-12-05 DIAGNOSIS — Z86.79 PERSONAL HISTORY OF OTHER DISEASES OF THE CIRCULATORY SYSTEM: ICD-10-CM

## 2024-12-05 DIAGNOSIS — C77.9 MALIGNANT NEOPLASM OF UNSPECIFIED SITE OF UNSPECIFIED FEMALE BREAST: ICD-10-CM

## 2024-12-05 DIAGNOSIS — Z17.421 MALIGNANT NEOPLASM OF UNSPECIFIED SITE OF UNSPECIFIED FEMALE BREAST: ICD-10-CM

## 2024-12-05 DIAGNOSIS — C50.919 MALIGNANT NEOPLASM OF UNSPECIFIED SITE OF UNSPECIFIED FEMALE BREAST: ICD-10-CM

## 2024-12-05 DIAGNOSIS — C50.911 MALIGNANT NEOPLASM OF UNSPECIFIED SITE OF RIGHT FEMALE BREAST: ICD-10-CM

## 2024-12-05 DIAGNOSIS — T45.1X5D ADVERSE EFFECT OF ANTINEOPLASTIC AND IMMUNOSUPPRESSIVE DRUGS, SUBSEQUENT ENCOUNTER: ICD-10-CM

## 2024-12-05 DIAGNOSIS — Z86.39 PERSONAL HISTORY OF OTHER ENDOCRINE, NUTRITIONAL AND METABOLIC DISEASE: ICD-10-CM

## 2024-12-05 LAB
ALBUMIN SERPL ELPH-MCNC: 4 G/DL
ALP BLD-CCNC: 56 U/L
ALT SERPL-CCNC: 18 U/L
ANION GAP SERPL CALC-SCNC: 7 MMOL/L
AST SERPL-CCNC: 30 U/L
BILIRUB SERPL-MCNC: 0.5 MG/DL
BUN SERPL-MCNC: 15 MG/DL
CALCIUM SERPL-MCNC: 9.4 MG/DL
CHLORIDE SERPL-SCNC: 109 MMOL/L
CO2 SERPL-SCNC: 27 MMOL/L
CREAT SERPL-MCNC: 0.8 MG/DL
EGFR: 88 ML/MIN/1.73M2
ESTIMATED AVERAGE GLUCOSE: 105 MG/DL
GLUCOSE SERPL-MCNC: 106 MG/DL
HBA1C MFR BLD HPLC: 5.3 %
HCT VFR BLD CALC: 36.4 %
HGB BLD-MCNC: 12 G/DL
LYMPHOCYTES # BLD AUTO: 0.8 K/UL
LYMPHOCYTES NFR BLD AUTO: 19.6 %
MAN DIFF?: NO
MCHC RBC-ENTMCNC: 31 PG
MCHC RBC-ENTMCNC: 33 G/DL
MCV RBC AUTO: 94.1 FL
NEUTROPHILS # BLD AUTO: 3 K/UL
NEUTROPHILS NFR BLD AUTO: 69.2 %
PLATELET # BLD AUTO: 169 K/UL
POTASSIUM SERPL-SCNC: 3.8 MMOL/L
PROT SERPL-MCNC: 7.5 G/DL
RBC # BLD: 3.87 M/UL
RBC # FLD: 12.2 %
SODIUM SERPL-SCNC: 143 MMOL/L
WBC # FLD AUTO: 4.3 K/UL

## 2024-12-05 PROCEDURE — 99214 OFFICE O/P EST MOD 30 MIN: CPT

## 2024-12-05 RX ORDER — ROSUVASTATIN CALCIUM 5 MG/1
5 TABLET, FILM COATED ORAL
Refills: 0 | Status: ACTIVE | COMMUNITY
Start: 2024-12-05

## 2024-12-06 LAB — TSH SERPL-ACNC: 0.62 UIU/ML

## 2024-12-08 PROBLEM — Z86.39 HISTORY OF HYPERLIPIDEMIA: Status: RESOLVED | Noted: 2024-03-15 | Resolved: 2024-12-08

## 2024-12-08 PROBLEM — Z86.79 HISTORY OF HYPERTENSION: Status: RESOLVED | Noted: 2024-03-15 | Resolved: 2024-12-08

## 2024-12-10 ENCOUNTER — NON-APPOINTMENT (OUTPATIENT)
Age: 54
End: 2024-12-10

## 2024-12-12 ENCOUNTER — OUTPATIENT (OUTPATIENT)
Dept: OUTPATIENT SERVICES | Facility: HOSPITAL | Age: 54
LOS: 1 days | End: 2024-12-12
Payer: COMMERCIAL

## 2024-12-12 ENCOUNTER — APPOINTMENT (OUTPATIENT)
Dept: INFUSION THERAPY | Facility: CLINIC | Age: 54
End: 2024-12-12

## 2024-12-12 VITALS
SYSTOLIC BLOOD PRESSURE: 110 MMHG | HEART RATE: 82 BPM | OXYGEN SATURATION: 99 % | RESPIRATION RATE: 18 BRPM | DIASTOLIC BLOOD PRESSURE: 75 MMHG | TEMPERATURE: 98 F

## 2024-12-12 VITALS
HEART RATE: 90 BPM | DIASTOLIC BLOOD PRESSURE: 81 MMHG | SYSTOLIC BLOOD PRESSURE: 113 MMHG | OXYGEN SATURATION: 99 % | WEIGHT: 125 LBS | TEMPERATURE: 98 F | HEIGHT: 64 IN | RESPIRATION RATE: 18 BRPM

## 2024-12-12 DIAGNOSIS — C50.911 MALIGNANT NEOPLASM OF UNSPECIFIED SITE OF RIGHT FEMALE BREAST: ICD-10-CM

## 2024-12-12 LAB
A1C WITH ESTIMATED AVERAGE GLUCOSE RESULT: 5.5 % — SIGNIFICANT CHANGE UP (ref 4–5.6)
ALBUMIN SERPL ELPH-MCNC: 4.1 G/DL — SIGNIFICANT CHANGE UP (ref 3.3–5)
ALP SERPL-CCNC: 50 U/L — SIGNIFICANT CHANGE UP (ref 40–120)
ALT FLD-CCNC: 20 U/L — SIGNIFICANT CHANGE UP (ref 10–45)
ANION GAP SERPL CALC-SCNC: 7 MMOL/L — SIGNIFICANT CHANGE UP (ref 5–17)
AST SERPL-CCNC: 30 U/L — SIGNIFICANT CHANGE UP (ref 10–40)
BASOPHILS # BLD AUTO: 0.01 K/UL — SIGNIFICANT CHANGE UP (ref 0–0.2)
BASOPHILS NFR BLD AUTO: 0.3 % — SIGNIFICANT CHANGE UP (ref 0–2)
BILIRUB SERPL-MCNC: 0.6 MG/DL — SIGNIFICANT CHANGE UP (ref 0.2–1.2)
BUN SERPL-MCNC: 13 MG/DL — SIGNIFICANT CHANGE UP (ref 7–23)
CALCIUM SERPL-MCNC: 9.9 MG/DL — SIGNIFICANT CHANGE UP (ref 8.4–10.5)
CHLORIDE SERPL-SCNC: 110 MMOL/L — HIGH (ref 96–108)
CO2 SERPL-SCNC: 27 MMOL/L — SIGNIFICANT CHANGE UP (ref 22–31)
CORTIS SERPL-MCNC: 9.8 UG/DL — SIGNIFICANT CHANGE UP
CREAT SERPL-MCNC: 0.9 MG/DL — SIGNIFICANT CHANGE UP (ref 0.5–1.3)
EGFR: 76 ML/MIN/1.73M2 — SIGNIFICANT CHANGE UP
EOSINOPHIL # BLD AUTO: 0.21 K/UL — SIGNIFICANT CHANGE UP (ref 0–0.5)
EOSINOPHIL NFR BLD AUTO: 6.5 % — HIGH (ref 0–6)
ESTIMATED AVERAGE GLUCOSE: 111 MG/DL — SIGNIFICANT CHANGE UP (ref 68–114)
GLUCOSE SERPL-MCNC: 117 MG/DL — HIGH (ref 70–99)
HCT VFR BLD CALC: 32.2 % — LOW (ref 34.5–45)
HGB BLD-MCNC: 10.5 G/DL — LOW (ref 11.5–15.5)
IMM GRANULOCYTES NFR BLD AUTO: 0.3 % — SIGNIFICANT CHANGE UP (ref 0–0.9)
LYMPHOCYTES # BLD AUTO: 0.63 K/UL — LOW (ref 1–3.3)
LYMPHOCYTES # BLD AUTO: 19.4 % — SIGNIFICANT CHANGE UP (ref 13–44)
MCHC RBC-ENTMCNC: 30.3 PG — SIGNIFICANT CHANGE UP (ref 27–34)
MCHC RBC-ENTMCNC: 32.6 G/DL — SIGNIFICANT CHANGE UP (ref 32–36)
MCV RBC AUTO: 93.1 FL — SIGNIFICANT CHANGE UP (ref 80–100)
MONOCYTES # BLD AUTO: 0.31 K/UL — SIGNIFICANT CHANGE UP (ref 0–0.9)
MONOCYTES NFR BLD AUTO: 9.6 % — SIGNIFICANT CHANGE UP (ref 2–14)
NEUTROPHILS # BLD AUTO: 2.07 K/UL — SIGNIFICANT CHANGE UP (ref 1.8–7.4)
NEUTROPHILS NFR BLD AUTO: 63.9 % — SIGNIFICANT CHANGE UP (ref 43–77)
NRBC # BLD: 0 /100 WBCS — SIGNIFICANT CHANGE UP (ref 0–0)
PLATELET # BLD AUTO: 165 K/UL — SIGNIFICANT CHANGE UP (ref 150–400)
POTASSIUM SERPL-MCNC: 4.3 MMOL/L — SIGNIFICANT CHANGE UP (ref 3.5–5.3)
POTASSIUM SERPL-SCNC: 4.3 MMOL/L — SIGNIFICANT CHANGE UP (ref 3.5–5.3)
PROT SERPL-MCNC: 7 G/DL — SIGNIFICANT CHANGE UP (ref 6–8.3)
RBC # BLD: 3.46 M/UL — LOW (ref 3.8–5.2)
RBC # FLD: 11.9 % — SIGNIFICANT CHANGE UP (ref 10.3–14.5)
SODIUM SERPL-SCNC: 144 MMOL/L — SIGNIFICANT CHANGE UP (ref 135–145)
TSH SERPL-MCNC: 0.96 UIU/ML — SIGNIFICANT CHANGE UP (ref 0.27–4.2)
WBC # BLD: 3.24 K/UL — LOW (ref 3.8–10.5)
WBC # FLD AUTO: 3.24 K/UL — LOW (ref 3.8–10.5)

## 2024-12-12 PROCEDURE — 82533 TOTAL CORTISOL: CPT

## 2024-12-12 PROCEDURE — 36415 COLL VENOUS BLD VENIPUNCTURE: CPT

## 2024-12-12 PROCEDURE — 83036 HEMOGLOBIN GLYCOSYLATED A1C: CPT

## 2024-12-12 PROCEDURE — 80053 COMPREHEN METABOLIC PANEL: CPT

## 2024-12-12 PROCEDURE — 96413 CHEMO IV INFUSION 1 HR: CPT

## 2024-12-12 PROCEDURE — 85025 COMPLETE CBC W/AUTO DIFF WBC: CPT

## 2024-12-12 PROCEDURE — 84443 ASSAY THYROID STIM HORMONE: CPT

## 2024-12-12 RX ORDER — SODIUM CHLORIDE 9 MG/ML
500 INJECTION, SOLUTION INTRAMUSCULAR; INTRAVENOUS; SUBCUTANEOUS ONCE
Refills: 0 | Status: COMPLETED | OUTPATIENT
Start: 2024-12-12 | End: 2024-12-12

## 2024-12-12 RX ORDER — SODIUM CHLORIDE 9 MG/ML
10 INJECTION, SOLUTION INTRAMUSCULAR; INTRAVENOUS; SUBCUTANEOUS ONCE
Refills: 0 | Status: COMPLETED | OUTPATIENT
Start: 2024-12-12 | End: 2024-12-12

## 2024-12-12 RX ORDER — LIDOCAINE 40 MG/G
1 CREAM TOPICAL ONCE
Refills: 0 | Status: COMPLETED | OUTPATIENT
Start: 2024-12-12 | End: 2024-12-12

## 2024-12-12 RX ORDER — PEMBROLIZUMAB 25 MG/ML
400 INJECTION, SOLUTION INTRAVENOUS ONCE
Refills: 0 | Status: COMPLETED | OUTPATIENT
Start: 2024-12-12 | End: 2024-12-12

## 2024-12-12 RX ADMIN — SODIUM CHLORIDE 10 MILLILITER(S): 9 INJECTION, SOLUTION INTRAMUSCULAR; INTRAVENOUS; SUBCUTANEOUS at 12:35

## 2024-12-12 RX ADMIN — PEMBROLIZUMAB 400 MILLIGRAM(S): 25 INJECTION, SOLUTION INTRAVENOUS at 11:31

## 2024-12-12 RX ADMIN — SODIUM CHLORIDE 500 MILLILITER(S): 9 INJECTION, SOLUTION INTRAMUSCULAR; INTRAVENOUS; SUBCUTANEOUS at 12:02

## 2024-12-12 RX ADMIN — PEMBROLIZUMAB 400 MILLIGRAM(S): 25 INJECTION, SOLUTION INTRAVENOUS at 12:02

## 2024-12-12 RX ADMIN — LIDOCAINE 1 APPLICATION(S): 40 CREAM TOPICAL at 11:20

## 2024-12-12 RX ADMIN — SODIUM CHLORIDE 500 MILLILITER(S): 9 INJECTION, SOLUTION INTRAMUSCULAR; INTRAVENOUS; SUBCUTANEOUS at 12:32

## 2024-12-17 ENCOUNTER — NON-APPOINTMENT (OUTPATIENT)
Age: 54
End: 2024-12-17

## 2024-12-26 ENCOUNTER — NON-APPOINTMENT (OUTPATIENT)
Age: 54
End: 2024-12-26

## 2024-12-31 ENCOUNTER — NON-APPOINTMENT (OUTPATIENT)
Age: 54
End: 2024-12-31

## 2025-01-21 ENCOUNTER — APPOINTMENT (OUTPATIENT)
Dept: PLASTIC SURGERY | Facility: CLINIC | Age: 55
End: 2025-01-21
Payer: COMMERCIAL

## 2025-01-21 DIAGNOSIS — Z42.1 ENCOUNTER FOR BREAST RECONSTRUCTION FOLLOWING MASTECTOMY: ICD-10-CM

## 2025-01-21 PROCEDURE — 99213 OFFICE O/P EST LOW 20 MIN: CPT

## 2025-01-23 ENCOUNTER — OUTPATIENT (OUTPATIENT)
Dept: OUTPATIENT SERVICES | Facility: HOSPITAL | Age: 55
LOS: 1 days | End: 2025-01-23
Payer: COMMERCIAL

## 2025-01-23 ENCOUNTER — APPOINTMENT (OUTPATIENT)
Dept: HEMATOLOGY ONCOLOGY | Facility: CLINIC | Age: 55
End: 2025-01-23
Payer: COMMERCIAL

## 2025-01-23 ENCOUNTER — APPOINTMENT (OUTPATIENT)
Dept: INFUSION THERAPY | Facility: CLINIC | Age: 55
End: 2025-01-23

## 2025-01-23 VITALS
OXYGEN SATURATION: 99 % | DIASTOLIC BLOOD PRESSURE: 70 MMHG | WEIGHT: 123.5 LBS | HEIGHT: 64 IN | BODY MASS INDEX: 21.08 KG/M2 | SYSTOLIC BLOOD PRESSURE: 102 MMHG | HEART RATE: 87 BPM | RESPIRATION RATE: 18 BRPM

## 2025-01-23 VITALS
SYSTOLIC BLOOD PRESSURE: 112 MMHG | TEMPERATURE: 99 F | OXYGEN SATURATION: 99 % | DIASTOLIC BLOOD PRESSURE: 82 MMHG | HEART RATE: 81 BPM | RESPIRATION RATE: 18 BRPM

## 2025-01-23 VITALS
HEIGHT: 64 IN | HEART RATE: 78 BPM | RESPIRATION RATE: 18 BRPM | TEMPERATURE: 99 F | OXYGEN SATURATION: 97 % | WEIGHT: 125 LBS | DIASTOLIC BLOOD PRESSURE: 70 MMHG | SYSTOLIC BLOOD PRESSURE: 108 MMHG

## 2025-01-23 DIAGNOSIS — C50.919 MALIGNANT NEOPLASM OF UNSPECIFIED SITE OF UNSPECIFIED FEMALE BREAST: ICD-10-CM

## 2025-01-23 DIAGNOSIS — T45.1X5D ADVERSE EFFECT OF ANTINEOPLASTIC AND IMMUNOSUPPRESSIVE DRUGS, SUBSEQUENT ENCOUNTER: ICD-10-CM

## 2025-01-23 DIAGNOSIS — C50.911 MALIGNANT NEOPLASM OF UNSPECIFIED SITE OF RIGHT FEMALE BREAST: ICD-10-CM

## 2025-01-23 DIAGNOSIS — I10 ESSENTIAL (PRIMARY) HYPERTENSION: ICD-10-CM

## 2025-01-23 DIAGNOSIS — C77.9 MALIGNANT NEOPLASM OF UNSPECIFIED SITE OF UNSPECIFIED FEMALE BREAST: ICD-10-CM

## 2025-01-23 DIAGNOSIS — Z17.421 MALIGNANT NEOPLASM OF UNSPECIFIED SITE OF UNSPECIFIED FEMALE BREAST: ICD-10-CM

## 2025-01-23 LAB
ALBUMIN SERPL ELPH-MCNC: 4.1 G/DL
ALP BLD-CCNC: 67 U/L
ALT SERPL-CCNC: 20 U/L
ANION GAP SERPL CALC-SCNC: 2 MMOL/L
AST SERPL-CCNC: 33 U/L
BILIRUB SERPL-MCNC: 0.6 MG/DL
BUN SERPL-MCNC: 13 MG/DL
CALCIUM SERPL-MCNC: 10.2 MG/DL
CHLORIDE SERPL-SCNC: 110 MMOL/L
CO2 SERPL-SCNC: 26 MMOL/L
CREAT SERPL-MCNC: 0.7 MG/DL
EGFR: 103 ML/MIN/1.73M2
ESTIMATED AVERAGE GLUCOSE: 114 MG/DL
GLUCOSE SERPL-MCNC: 126 MG/DL
HBA1C MFR BLD HPLC: 5.6 %
HCT VFR BLD CALC: 34.8 %
HGB BLD-MCNC: 11.9 G/DL
LYMPHOCYTES # BLD AUTO: 0.5 K/UL
LYMPHOCYTES NFR BLD AUTO: 15.3 %
MAN DIFF?: NO
MCHC RBC-ENTMCNC: 30.5 PG
MCHC RBC-ENTMCNC: 34.2 G/DL
MCV RBC AUTO: 89.2 FL
NEUTROPHILS # BLD AUTO: 2.7 K/UL
NEUTROPHILS NFR BLD AUTO: 75.7 %
PLATELET # BLD AUTO: 173 K/UL
POTASSIUM SERPL-SCNC: 4.5 MMOL/L
PROT SERPL-MCNC: 7.4 G/DL
RBC # BLD: 3.9 M/UL
RBC # FLD: 12.8 %
SODIUM SERPL-SCNC: 138 MMOL/L
WBC # FLD AUTO: 3.5 K/UL

## 2025-01-23 PROCEDURE — 99214 OFFICE O/P EST MOD 30 MIN: CPT | Mod: 25

## 2025-01-23 PROCEDURE — 96413 CHEMO IV INFUSION 1 HR: CPT

## 2025-01-23 RX ORDER — PEMBROLIZUMAB 50 MG/2ML
400 INJECTION, POWDER, LYOPHILIZED, FOR SOLUTION INTRAVENOUS ONCE
Refills: 0 | Status: COMPLETED | OUTPATIENT
Start: 2025-01-23 | End: 2025-01-23

## 2025-01-23 RX ORDER — BACTERIOSTATIC SODIUM CHLORIDE 0.9 %
10 VIAL (ML) INJECTION ONCE
Refills: 0 | Status: ACTIVE | OUTPATIENT
Start: 2025-01-23

## 2025-01-23 RX ORDER — LIDOCAINE HYDROCHLORIDE 30 MG/G
1 CREAM TOPICAL ONCE
Refills: 0 | Status: ACTIVE | OUTPATIENT
Start: 2025-01-23

## 2025-01-23 RX ORDER — BACTERIOSTATIC SODIUM CHLORIDE 0.9 %
500 VIAL (ML) INJECTION ONCE
Refills: 0 | Status: COMPLETED | OUTPATIENT
Start: 2025-01-23 | End: 2025-01-23

## 2025-01-23 RX ADMIN — PEMBROLIZUMAB 400 MILLIGRAM(S): 50 INJECTION, POWDER, LYOPHILIZED, FOR SOLUTION INTRAVENOUS at 13:10

## 2025-01-23 RX ADMIN — Medication 500 MILLILITER(S): at 14:35

## 2025-01-23 RX ADMIN — PEMBROLIZUMAB 400 MILLIGRAM(S): 50 INJECTION, POWDER, LYOPHILIZED, FOR SOLUTION INTRAVENOUS at 13:40

## 2025-01-23 RX ADMIN — Medication 500 MILLILITER(S): at 14:05

## 2025-01-24 LAB
CORTISOL: 15.1 UG/DL
TSH SERPL-ACNC: 1.08 UIU/ML

## 2025-01-31 ENCOUNTER — APPOINTMENT (OUTPATIENT)
Dept: CT IMAGING | Facility: CLINIC | Age: 55
End: 2025-01-31
Payer: COMMERCIAL

## 2025-01-31 ENCOUNTER — OUTPATIENT (OUTPATIENT)
Dept: OUTPATIENT SERVICES | Facility: HOSPITAL | Age: 55
LOS: 1 days | End: 2025-01-31

## 2025-01-31 PROCEDURE — 74174 CTA ABD&PLVS W/CONTRAST: CPT | Mod: 26

## 2025-02-01 LAB
CORTICOSTEROID BIND GLOBULIN: 3.3 MG/DL
CORTIS SERPL-MCNC: 16 UG/DL
CORTISOL, FREE: 0.74 UG/DL
PFCX: 4.6 %

## 2025-02-06 RX ORDER — CAPECITABINE 500 MG/1
500 TABLET ORAL
Qty: 56 | Refills: 6 | Status: ACTIVE | COMMUNITY
Start: 2025-02-06 | End: 1900-01-01

## 2025-02-18 ENCOUNTER — APPOINTMENT (OUTPATIENT)
Dept: HEMATOLOGY ONCOLOGY | Facility: CLINIC | Age: 55
End: 2025-02-18
Payer: COMMERCIAL

## 2025-02-18 VITALS
BODY MASS INDEX: 21.68 KG/M2 | HEART RATE: 112 BPM | DIASTOLIC BLOOD PRESSURE: 88 MMHG | SYSTOLIC BLOOD PRESSURE: 130 MMHG | RESPIRATION RATE: 18 BRPM | HEIGHT: 64 IN | WEIGHT: 127 LBS | TEMPERATURE: 98.6 F | OXYGEN SATURATION: 99 %

## 2025-02-18 DIAGNOSIS — T45.1X5D ADVERSE EFFECT OF ANTINEOPLASTIC AND IMMUNOSUPPRESSIVE DRUGS, SUBSEQUENT ENCOUNTER: ICD-10-CM

## 2025-02-18 DIAGNOSIS — M47.897 OTHER SPONDYLOSIS, LUMBOSACRAL REGION: ICD-10-CM

## 2025-02-18 DIAGNOSIS — C77.9 MALIGNANT NEOPLASM OF UNSPECIFIED SITE OF UNSPECIFIED FEMALE BREAST: ICD-10-CM

## 2025-02-18 DIAGNOSIS — C50.919 MALIGNANT NEOPLASM OF UNSPECIFIED SITE OF UNSPECIFIED FEMALE BREAST: ICD-10-CM

## 2025-02-18 DIAGNOSIS — I10 ESSENTIAL (PRIMARY) HYPERTENSION: ICD-10-CM

## 2025-02-18 DIAGNOSIS — M47.817 SPONDYLOSIS W/OUT MYELOPATHY OR RADICULOPATHY, LUMBOSACRAL REGION: ICD-10-CM

## 2025-02-18 DIAGNOSIS — Z17.421 MALIGNANT NEOPLASM OF UNSPECIFIED SITE OF UNSPECIFIED FEMALE BREAST: ICD-10-CM

## 2025-02-18 LAB
ALBUMIN SERPL ELPH-MCNC: 4.1 G/DL
ALP BLD-CCNC: 82 U/L
ALT SERPL-CCNC: 16 U/L
ANION GAP SERPL CALC-SCNC: 3 MMOL/L
AST SERPL-CCNC: 31 U/L
BILIRUB SERPL-MCNC: 0.7 MG/DL
BUN SERPL-MCNC: 14 MG/DL
CALCIUM SERPL-MCNC: 10.2 MG/DL
CHLORIDE SERPL-SCNC: 109 MMOL/L
CO2 SERPL-SCNC: 29 MMOL/L
CORTIS SERPL-MCNC: 11.88 UG/DL
CREAT SERPL-MCNC: 0.9 MG/DL
EGFR: 76 ML/MIN/1.73M2
ESTIMATED AVERAGE GLUCOSE: 114 MG/DL
GLUCOSE SERPL-MCNC: 118 MG/DL
HBA1C MFR BLD HPLC: 5.6 %
HCT VFR BLD CALC: 36.5 %
HGB BLD-MCNC: 12.2 G/DL
LYMPHOCYTES # BLD AUTO: 0.6 K/UL
LYMPHOCYTES NFR BLD AUTO: 12.3 %
MAN DIFF?: NO
MCHC RBC-ENTMCNC: 30.5 PG
MCHC RBC-ENTMCNC: 33.4 G/DL
MCV RBC AUTO: 91.3 FL
NEUTROPHILS # BLD AUTO: 3.6 K/UL
NEUTROPHILS NFR BLD AUTO: 72.2 %
PLATELET # BLD AUTO: 204 K/UL
POTASSIUM SERPL-SCNC: 4.3 MMOL/L
PROT SERPL-MCNC: 7.8 G/DL
RBC # BLD: 4 M/UL
RBC # FLD: 13.1 %
SODIUM SERPL-SCNC: 141 MMOL/L
WBC # FLD AUTO: 5 K/UL

## 2025-02-18 PROCEDURE — 99214 OFFICE O/P EST MOD 30 MIN: CPT | Mod: 25

## 2025-02-18 RX ORDER — ONDANSETRON 8 MG/1
8 TABLET, ORALLY DISINTEGRATING ORAL EVERY 8 HOURS
Qty: 20 | Refills: 6 | Status: ACTIVE | COMMUNITY
Start: 2025-02-18 | End: 1900-01-01

## 2025-02-18 RX ORDER — OXYCODONE 5 MG/1
5 TABLET ORAL
Refills: 0 | Status: ACTIVE | COMMUNITY
Start: 2025-02-18

## 2025-02-18 RX ORDER — GABAPENTIN 300 MG/1
300 CAPSULE ORAL
Refills: 0 | Status: ACTIVE | COMMUNITY
Start: 2025-02-18

## 2025-02-19 PROBLEM — M47.897 OTHER OSTEOARTHRITIS OF SPINE, LUMBOSACRAL REGION: Status: ACTIVE | Noted: 2025-02-19

## 2025-02-19 PROBLEM — M47.817 SPONDYLOSIS OF LUMBOSACRAL REGION, UNSPECIFIED SPINAL OSTEOARTHRITIS COMPLICATION STATUS: Status: ACTIVE | Noted: 2025-02-19

## 2025-02-19 LAB — TSH SERPL-ACNC: 0.49 UIU/ML

## 2025-02-26 ENCOUNTER — NON-APPOINTMENT (OUTPATIENT)
Age: 55
End: 2025-02-26

## 2025-02-26 ENCOUNTER — APPOINTMENT (OUTPATIENT)
Dept: RADIATION ONCOLOGY | Facility: CLINIC | Age: 55
End: 2025-02-26
Payer: COMMERCIAL

## 2025-02-26 PROCEDURE — 99024 POSTOP FOLLOW-UP VISIT: CPT

## 2025-03-04 ENCOUNTER — APPOINTMENT (OUTPATIENT)
Dept: HEMATOLOGY ONCOLOGY | Facility: CLINIC | Age: 55
End: 2025-03-04

## 2025-03-06 ENCOUNTER — APPOINTMENT (OUTPATIENT)
Dept: INFUSION THERAPY | Facility: CLINIC | Age: 55
End: 2025-03-06

## 2025-03-06 ENCOUNTER — OUTPATIENT (OUTPATIENT)
Dept: OUTPATIENT SERVICES | Facility: HOSPITAL | Age: 55
LOS: 1 days | End: 2025-03-06
Payer: COMMERCIAL

## 2025-03-06 ENCOUNTER — APPOINTMENT (OUTPATIENT)
Dept: HEMATOLOGY ONCOLOGY | Facility: CLINIC | Age: 55
End: 2025-03-06
Payer: COMMERCIAL

## 2025-03-06 VITALS
HEART RATE: 71 BPM | TEMPERATURE: 98 F | DIASTOLIC BLOOD PRESSURE: 70 MMHG | RESPIRATION RATE: 20 BRPM | OXYGEN SATURATION: 100 % | SYSTOLIC BLOOD PRESSURE: 111 MMHG

## 2025-03-06 VITALS
HEIGHT: 64 IN | DIASTOLIC BLOOD PRESSURE: 73 MMHG | BODY MASS INDEX: 21.34 KG/M2 | TEMPERATURE: 98.6 F | SYSTOLIC BLOOD PRESSURE: 117 MMHG | WEIGHT: 125 LBS | HEART RATE: 84 BPM | OXYGEN SATURATION: 98 % | RESPIRATION RATE: 18 BRPM

## 2025-03-06 VITALS
TEMPERATURE: 98 F | SYSTOLIC BLOOD PRESSURE: 100 MMHG | HEIGHT: 64 IN | RESPIRATION RATE: 20 BRPM | DIASTOLIC BLOOD PRESSURE: 63 MMHG | OXYGEN SATURATION: 99 % | HEART RATE: 73 BPM | WEIGHT: 126.1 LBS

## 2025-03-06 DIAGNOSIS — C50.919 MALIGNANT NEOPLASM OF UNSPECIFIED SITE OF UNSPECIFIED FEMALE BREAST: ICD-10-CM

## 2025-03-06 DIAGNOSIS — Z17.421 MALIGNANT NEOPLASM OF UNSPECIFIED SITE OF UNSPECIFIED FEMALE BREAST: ICD-10-CM

## 2025-03-06 DIAGNOSIS — M47.897 OTHER SPONDYLOSIS, LUMBOSACRAL REGION: ICD-10-CM

## 2025-03-06 DIAGNOSIS — C77.9 MALIGNANT NEOPLASM OF UNSPECIFIED SITE OF UNSPECIFIED FEMALE BREAST: ICD-10-CM

## 2025-03-06 DIAGNOSIS — C50.911 MALIGNANT NEOPLASM OF UNSPECIFIED SITE OF RIGHT FEMALE BREAST: ICD-10-CM

## 2025-03-06 LAB
ALBUMIN SERPL ELPH-MCNC: 3.9 G/DL
ALP BLD-CCNC: 72 U/L
ALT SERPL-CCNC: 11 U/L
ANION GAP SERPL CALC-SCNC: 5 MMOL/L
AST SERPL-CCNC: 26 U/L
BILIRUB SERPL-MCNC: 0.6 MG/DL
BUN SERPL-MCNC: 15 MG/DL
CALCIUM SERPL-MCNC: 10.2 MG/DL
CHLORIDE SERPL-SCNC: 110 MMOL/L
CO2 SERPL-SCNC: 27 MMOL/L
CORTIS SERPL-MCNC: 16.77 UG/DL
CREAT SERPL-MCNC: 0.8 MG/DL
EGFRCR SERPLBLD CKD-EPI 2021: 88 ML/MIN/1.73M2
ESTIMATED AVERAGE GLUCOSE: 111 MG/DL
GLUCOSE SERPL-MCNC: 147 MG/DL
HBA1C MFR BLD HPLC: 5.5 %
HCT VFR BLD CALC: 33.4 %
HGB BLD-MCNC: 11.2 G/DL
LYMPHOCYTES # BLD AUTO: 0.8 K/UL
LYMPHOCYTES NFR BLD AUTO: 14.9 %
MAN DIFF?: NO
MCHC RBC-ENTMCNC: 31 PG
MCHC RBC-ENTMCNC: 33.5 G/DL
MCV RBC AUTO: 92.5 FL
NEUTROPHILS # BLD AUTO: 4 K/UL
NEUTROPHILS NFR BLD AUTO: 70.8 %
PLATELET # BLD AUTO: 176 K/UL
POTASSIUM SERPL-SCNC: 4.1 MMOL/L
PROT SERPL-MCNC: 7.2 G/DL
RBC # BLD: 3.61 M/UL
RBC # FLD: 13.9 %
SODIUM SERPL-SCNC: 142 MMOL/L
WBC # FLD AUTO: 5.6 K/UL

## 2025-03-06 PROCEDURE — 99213 OFFICE O/P EST LOW 20 MIN: CPT | Mod: 25

## 2025-03-06 PROCEDURE — 96413 CHEMO IV INFUSION 1 HR: CPT

## 2025-03-06 RX ORDER — LIDOCAINE HYDROCHLORIDE 20 MG/ML
1 JELLY TOPICAL ONCE
Refills: 0 | Status: COMPLETED | OUTPATIENT
Start: 2025-03-06 | End: 2025-03-06

## 2025-03-06 RX ORDER — PEMBROLIZUMAB 50 MG/2ML
400 INJECTION, POWDER, LYOPHILIZED, FOR SOLUTION INTRAVENOUS ONCE
Refills: 0 | Status: COMPLETED | OUTPATIENT
Start: 2025-03-06 | End: 2025-03-06

## 2025-03-06 RX ADMIN — Medication 500 MILLILITER(S): at 10:43

## 2025-03-06 RX ADMIN — PEMBROLIZUMAB 400 MILLIGRAM(S): 50 INJECTION, POWDER, LYOPHILIZED, FOR SOLUTION INTRAVENOUS at 10:46

## 2025-03-06 RX ADMIN — PEMBROLIZUMAB 400 MILLIGRAM(S): 50 INJECTION, POWDER, LYOPHILIZED, FOR SOLUTION INTRAVENOUS at 10:10

## 2025-03-07 LAB — TSH SERPL-ACNC: 0.95 UIU/ML

## 2025-04-01 PROBLEM — Z90.11 ACQUIRED ABSENCE OF RIGHT BREAST AND NIPPLE: Status: ACTIVE | Noted: 2025-04-01

## 2025-04-04 ENCOUNTER — APPOINTMENT (OUTPATIENT)
Dept: BREAST CENTER | Facility: CLINIC | Age: 55
End: 2025-04-04

## 2025-04-04 DIAGNOSIS — Z90.11 ACQUIRED ABSENCE OF RIGHT BREAST AND NIPPLE: ICD-10-CM

## 2025-04-17 ENCOUNTER — OUTPATIENT (OUTPATIENT)
Dept: OUTPATIENT SERVICES | Facility: HOSPITAL | Age: 55
LOS: 1 days | End: 2025-04-17

## 2025-04-17 ENCOUNTER — APPOINTMENT (OUTPATIENT)
Dept: HEMATOLOGY ONCOLOGY | Facility: CLINIC | Age: 55
End: 2025-04-17
Payer: MEDICAID

## 2025-04-17 ENCOUNTER — APPOINTMENT (OUTPATIENT)
Dept: INFUSION THERAPY | Facility: CLINIC | Age: 55
End: 2025-04-17

## 2025-04-17 VITALS
WEIGHT: 124 LBS | BODY MASS INDEX: 21.17 KG/M2 | DIASTOLIC BLOOD PRESSURE: 84 MMHG | RESPIRATION RATE: 18 BRPM | HEART RATE: 65 BPM | TEMPERATURE: 98.2 F | OXYGEN SATURATION: 100 % | SYSTOLIC BLOOD PRESSURE: 134 MMHG | HEIGHT: 64 IN

## 2025-04-17 DIAGNOSIS — C50.919 MALIGNANT NEOPLASM OF UNSPECIFIED SITE OF UNSPECIFIED FEMALE BREAST: ICD-10-CM

## 2025-04-17 DIAGNOSIS — T45.1X5D ADVERSE EFFECT OF ANTINEOPLASTIC AND IMMUNOSUPPRESSIVE DRUGS, SUBSEQUENT ENCOUNTER: ICD-10-CM

## 2025-04-17 DIAGNOSIS — C77.9 MALIGNANT NEOPLASM OF UNSPECIFIED SITE OF UNSPECIFIED FEMALE BREAST: ICD-10-CM

## 2025-04-17 DIAGNOSIS — Z17.421 MALIGNANT NEOPLASM OF UNSPECIFIED SITE OF UNSPECIFIED FEMALE BREAST: ICD-10-CM

## 2025-04-17 DIAGNOSIS — M54.9 DORSALGIA, UNSPECIFIED: ICD-10-CM

## 2025-04-17 DIAGNOSIS — C50.911 MALIGNANT NEOPLASM OF UNSPECIFIED SITE OF RIGHT FEMALE BREAST: ICD-10-CM

## 2025-04-17 LAB
ALBUMIN SERPL ELPH-MCNC: 4.2 G/DL
ALP BLD-CCNC: 62 U/L
ALT SERPL-CCNC: 17 U/L
ANION GAP SERPL CALC-SCNC: 1 MMOL/L
AST SERPL-CCNC: 31 U/L
BILIRUB SERPL-MCNC: 0.6 MG/DL
BUN SERPL-MCNC: 16 MG/DL
CALCIUM SERPL-MCNC: 10 MG/DL
CHLORIDE SERPL-SCNC: 113 MMOL/L
CO2 SERPL-SCNC: 27 MMOL/L
CORTIS SERPL-MCNC: 15.59 UG/DL
CREAT SERPL-MCNC: 0.7 MG/DL
EGFRCR SERPLBLD CKD-EPI 2021: 103 ML/MIN/1.73M2
ESTIMATED AVERAGE GLUCOSE: 114 MG/DL
GLUCOSE SERPL-MCNC: 114 MG/DL
HBA1C MFR BLD HPLC: 5.6 %
HCT VFR BLD CALC: 34.8 %
HGB BLD-MCNC: 11.7 G/DL
LYMPHOCYTES # BLD AUTO: 0.8 K/UL
LYMPHOCYTES NFR BLD AUTO: 17 %
MAN DIFF?: NO
MCHC RBC-ENTMCNC: 31.5 PG
MCHC RBC-ENTMCNC: 33.6 G/DL
MCV RBC AUTO: 93.5 FL
NEUTROPHILS # BLD AUTO: 3.5 K/UL
NEUTROPHILS NFR BLD AUTO: 71 %
PLATELET # BLD AUTO: 175 K/UL
POTASSIUM SERPL-SCNC: 4.4 MMOL/L
PROT SERPL-MCNC: 7.6 G/DL
RBC # BLD: 3.72 M/UL
RBC # FLD: 14 %
SODIUM SERPL-SCNC: 141 MMOL/L
WBC # FLD AUTO: 4.9 K/UL

## 2025-04-17 PROCEDURE — 99214 OFFICE O/P EST MOD 30 MIN: CPT | Mod: 25

## 2025-04-18 LAB — TSH SERPL-ACNC: 1.05 UIU/ML

## 2025-05-02 ENCOUNTER — NON-APPOINTMENT (OUTPATIENT)
Age: 55
End: 2025-05-02

## 2025-05-02 ENCOUNTER — APPOINTMENT (OUTPATIENT)
Dept: BREAST CENTER | Facility: CLINIC | Age: 55
End: 2025-05-02

## 2025-05-02 VITALS
HEART RATE: 80 BPM | BODY MASS INDEX: 21.34 KG/M2 | SYSTOLIC BLOOD PRESSURE: 114 MMHG | DIASTOLIC BLOOD PRESSURE: 74 MMHG | HEIGHT: 64 IN | WEIGHT: 125 LBS

## 2025-05-02 DIAGNOSIS — C50.919 MALIGNANT NEOPLASM OF UNSPECIFIED SITE OF UNSPECIFIED FEMALE BREAST: ICD-10-CM

## 2025-05-02 DIAGNOSIS — Z17.421 MALIGNANT NEOPLASM OF UNSPECIFIED SITE OF UNSPECIFIED FEMALE BREAST: ICD-10-CM

## 2025-05-02 DIAGNOSIS — Z00.00 ENCOUNTER FOR GENERAL ADULT MEDICAL EXAMINATION W/OUT ABNORMAL FINDINGS: ICD-10-CM

## 2025-05-02 DIAGNOSIS — Z90.11 ACQUIRED ABSENCE OF RIGHT BREAST AND NIPPLE: ICD-10-CM

## 2025-05-02 PROCEDURE — 99213 OFFICE O/P EST LOW 20 MIN: CPT

## 2025-05-06 ENCOUNTER — APPOINTMENT (OUTPATIENT)
Dept: PLASTIC SURGERY | Facility: CLINIC | Age: 55
End: 2025-05-06
Payer: MEDICAID

## 2025-05-06 DIAGNOSIS — Z42.1 ENCOUNTER FOR BREAST RECONSTRUCTION FOLLOWING MASTECTOMY: ICD-10-CM

## 2025-05-06 DIAGNOSIS — C50.911 MALIGNANT NEOPLASM OF UNSPECIFIED SITE OF RIGHT FEMALE BREAST: ICD-10-CM

## 2025-05-06 PROCEDURE — 99215 OFFICE O/P EST HI 40 MIN: CPT

## 2025-05-29 ENCOUNTER — APPOINTMENT (OUTPATIENT)
Dept: HEMATOLOGY ONCOLOGY | Facility: CLINIC | Age: 55
End: 2025-05-29
Payer: MEDICAID

## 2025-05-29 ENCOUNTER — APPOINTMENT (OUTPATIENT)
Dept: INFUSION THERAPY | Facility: CLINIC | Age: 55
End: 2025-05-29

## 2025-05-29 ENCOUNTER — NON-APPOINTMENT (OUTPATIENT)
Age: 55
End: 2025-05-29

## 2025-05-29 VITALS
OXYGEN SATURATION: 100 % | RESPIRATION RATE: 18 BRPM | HEART RATE: 71 BPM | WEIGHT: 126.13 LBS | SYSTOLIC BLOOD PRESSURE: 111 MMHG | BODY MASS INDEX: 21.53 KG/M2 | DIASTOLIC BLOOD PRESSURE: 73 MMHG | HEIGHT: 64 IN | TEMPERATURE: 98.5 F

## 2025-05-29 DIAGNOSIS — M47.897 OTHER SPONDYLOSIS, LUMBOSACRAL REGION: ICD-10-CM

## 2025-05-29 DIAGNOSIS — Z17.421 MALIGNANT NEOPLASM OF UNSPECIFIED SITE OF UNSPECIFIED FEMALE BREAST: ICD-10-CM

## 2025-05-29 DIAGNOSIS — C50.919 MALIGNANT NEOPLASM OF UNSPECIFIED SITE OF UNSPECIFIED FEMALE BREAST: ICD-10-CM

## 2025-05-29 DIAGNOSIS — C77.9 MALIGNANT NEOPLASM OF UNSPECIFIED SITE OF UNSPECIFIED FEMALE BREAST: ICD-10-CM

## 2025-05-29 DIAGNOSIS — M54.9 DORSALGIA, UNSPECIFIED: ICD-10-CM

## 2025-05-29 LAB
ALBUMIN SERPL ELPH-MCNC: 4.1 G/DL
ALP BLD-CCNC: 75 U/L
ALT SERPL-CCNC: 17 U/L
ANION GAP SERPL CALC-SCNC: 4 MMOL/L
AST SERPL-CCNC: 32 U/L
BILIRUB SERPL-MCNC: 0.8 MG/DL
BUN SERPL-MCNC: 20 MG/DL
CALCIUM SERPL-MCNC: 10.1 MG/DL
CHLORIDE SERPL-SCNC: 110 MMOL/L
CO2 SERPL-SCNC: 25 MMOL/L
CORTIS SERPL-MCNC: 13.13 UG/DL
CREAT SERPL-MCNC: 0.8 MG/DL
EGFRCR SERPLBLD CKD-EPI 2021: 88 ML/MIN/1.73M2
ESTIMATED AVERAGE GLUCOSE: 105 MG/DL
GLUCOSE SERPL-MCNC: 124 MG/DL
HBA1C MFR BLD HPLC: 5.3 %
HCT VFR BLD CALC: 35.3 %
HGB BLD-MCNC: 12 G/DL
LYMPHOCYTES # BLD AUTO: 0.8 K/UL
LYMPHOCYTES NFR BLD AUTO: 15.2 %
MAN DIFF?: NO
MCHC RBC-ENTMCNC: 32.7 PG
MCHC RBC-ENTMCNC: 34 G/DL
MCV RBC AUTO: 96.2 FL
NEUTROPHILS # BLD AUTO: 4.1 K/UL
NEUTROPHILS NFR BLD AUTO: 74 %
PLATELET # BLD AUTO: 189 K/UL
POTASSIUM SERPL-SCNC: 4.3 MMOL/L
PROT SERPL-MCNC: 7.5 G/DL
RBC # BLD: 3.67 M/UL
RBC # FLD: 14.4 %
SODIUM SERPL-SCNC: 139 MMOL/L
WBC # FLD AUTO: 5.5 K/UL

## 2025-05-29 PROCEDURE — 99214 OFFICE O/P EST MOD 30 MIN: CPT | Mod: 25

## 2025-05-30 LAB — TSH SERPL-ACNC: 0.73 UIU/ML

## 2025-06-03 ENCOUNTER — NON-APPOINTMENT (OUTPATIENT)
Age: 55
End: 2025-06-03

## 2025-06-18 ENCOUNTER — APPOINTMENT (OUTPATIENT)
Dept: DERMATOLOGY | Facility: CLINIC | Age: 55
End: 2025-06-18
Payer: MEDICAID

## 2025-06-18 ENCOUNTER — APPOINTMENT (OUTPATIENT)
Dept: PLASTIC SURGERY | Facility: CLINIC | Age: 55
End: 2025-06-18

## 2025-06-18 PROBLEM — D22.60 MULTIPLE BENIGN NEVI OF UPPER EXTREMITY, LOWER EXTREMITY, AND TRUNK: Status: ACTIVE | Noted: 2025-06-18

## 2025-06-18 PROBLEM — L57.8 DERMATOHELIOSIS: Status: ACTIVE | Noted: 2025-06-18

## 2025-06-18 PROCEDURE — 99203 OFFICE O/P NEW LOW 30 MIN: CPT

## 2025-07-01 ENCOUNTER — APPOINTMENT (OUTPATIENT)
Dept: PHYSICAL MEDICINE AND REHAB | Facility: CLINIC | Age: 55
End: 2025-07-01
Payer: MEDICAID

## 2025-07-01 VITALS — SYSTOLIC BLOOD PRESSURE: 138 MMHG | HEART RATE: 92 BPM | DIASTOLIC BLOOD PRESSURE: 86 MMHG

## 2025-07-01 PROBLEM — G89.28 POST-MASTECTOMY PAIN SYNDROME: Status: ACTIVE | Noted: 2025-07-01

## 2025-07-01 PROCEDURE — 99205 OFFICE O/P NEW HI 60 MIN: CPT

## 2025-07-03 ENCOUNTER — APPOINTMENT (OUTPATIENT)
Dept: PLASTIC SURGERY | Facility: CLINIC | Age: 55
End: 2025-07-03

## 2025-07-08 ENCOUNTER — APPOINTMENT (OUTPATIENT)
Dept: PLASTIC SURGERY | Facility: CLINIC | Age: 55
End: 2025-07-08

## 2025-07-14 ENCOUNTER — NON-APPOINTMENT (OUTPATIENT)
Age: 55
End: 2025-07-14

## 2025-07-22 ENCOUNTER — RX RENEWAL (OUTPATIENT)
Age: 55
End: 2025-07-22

## 2025-07-28 RX ORDER — TIZANIDINE HYDROCHLORIDE 4 MG/1
4 TABLET ORAL EVERY 8 HOURS
Refills: 0 | Status: ACTIVE | COMMUNITY
Start: 2025-07-28

## 2025-07-31 ENCOUNTER — APPOINTMENT (OUTPATIENT)
Dept: HEMATOLOGY ONCOLOGY | Facility: CLINIC | Age: 55
End: 2025-07-31
Payer: MEDICAID

## 2025-07-31 VITALS
SYSTOLIC BLOOD PRESSURE: 109 MMHG | BODY MASS INDEX: 22.02 KG/M2 | HEART RATE: 76 BPM | RESPIRATION RATE: 18 BRPM | WEIGHT: 129 LBS | HEIGHT: 64 IN | TEMPERATURE: 98.6 F | DIASTOLIC BLOOD PRESSURE: 73 MMHG | OXYGEN SATURATION: 98 %

## 2025-07-31 DIAGNOSIS — C77.9 MALIGNANT NEOPLASM OF UNSPECIFIED SITE OF UNSPECIFIED FEMALE BREAST: ICD-10-CM

## 2025-07-31 DIAGNOSIS — C50.919 MALIGNANT NEOPLASM OF UNSPECIFIED SITE OF UNSPECIFIED FEMALE BREAST: ICD-10-CM

## 2025-07-31 DIAGNOSIS — M47.897 OTHER SPONDYLOSIS, LUMBOSACRAL REGION: ICD-10-CM

## 2025-07-31 DIAGNOSIS — C50.911 MALIGNANT NEOPLASM OF UNSPECIFIED SITE OF RIGHT FEMALE BREAST: ICD-10-CM

## 2025-07-31 DIAGNOSIS — M54.9 DORSALGIA, UNSPECIFIED: ICD-10-CM

## 2025-07-31 LAB
ALBUMIN SERPL ELPH-MCNC: 4.1 G/DL
ALP BLD-CCNC: 69 U/L
ALT SERPL-CCNC: 24 U/L
ANION GAP SERPL CALC-SCNC: 4 MMOL/L
AST SERPL-CCNC: 32 U/L
BILIRUB SERPL-MCNC: 0.6 MG/DL
BUN SERPL-MCNC: 16 MG/DL
CALCIUM SERPL-MCNC: 10.1 MG/DL
CHLORIDE SERPL-SCNC: 110 MMOL/L
CO2 SERPL-SCNC: 26 MMOL/L
CREAT SERPL-MCNC: 0.9 MG/DL
EGFRCR SERPLBLD CKD-EPI 2021: 76 ML/MIN/1.73M2
GLUCOSE SERPL-MCNC: 115 MG/DL
HCT VFR BLD CALC: 36 %
HGB BLD-MCNC: 12.3 G/DL
LYMPHOCYTES # BLD AUTO: 0.9 K/UL
LYMPHOCYTES NFR BLD AUTO: 26.8 %
MAN DIFF?: NO
MCHC RBC-ENTMCNC: 32.9 PG
MCHC RBC-ENTMCNC: 34.2 G/DL
MCV RBC AUTO: 96.3 FL
NEUTROPHILS # BLD AUTO: 2.1 K/UL
NEUTROPHILS NFR BLD AUTO: 60.2 %
PLATELET # BLD AUTO: 202 K/UL
POTASSIUM SERPL-SCNC: 4.1 MMOL/L
PROT SERPL-MCNC: 7.6 G/DL
RBC # BLD: 3.74 M/UL
RBC # FLD: 13.2 %
SODIUM SERPL-SCNC: 140 MMOL/L
WBC # FLD AUTO: 3.5 K/UL

## 2025-07-31 PROCEDURE — 99214 OFFICE O/P EST MOD 30 MIN: CPT | Mod: 25

## 2025-08-01 LAB
CORTIS SERPL-MCNC: 14.18 UG/DL
ESTIMATED AVERAGE GLUCOSE: 103 MG/DL
HBA1C MFR BLD HPLC: 5.2 %
TSH SERPL-ACNC: 0.74 UIU/ML

## 2025-08-04 ENCOUNTER — NON-APPOINTMENT (OUTPATIENT)
Age: 55
End: 2025-08-04

## 2025-08-14 ENCOUNTER — APPOINTMENT (OUTPATIENT)
Dept: BREAST CENTER | Facility: CLINIC | Age: 55
End: 2025-08-14

## 2025-08-14 VITALS
HEIGHT: 64 IN | SYSTOLIC BLOOD PRESSURE: 131 MMHG | WEIGHT: 132 LBS | BODY MASS INDEX: 22.53 KG/M2 | DIASTOLIC BLOOD PRESSURE: 79 MMHG | HEART RATE: 73 BPM

## 2025-08-14 DIAGNOSIS — Z17.421 MALIGNANT NEOPLASM OF UNSPECIFIED SITE OF UNSPECIFIED FEMALE BREAST: ICD-10-CM

## 2025-08-14 DIAGNOSIS — Z00.00 ENCOUNTER FOR GENERAL ADULT MEDICAL EXAMINATION W/OUT ABNORMAL FINDINGS: ICD-10-CM

## 2025-08-14 DIAGNOSIS — R92.30 DENSE BREASTS, UNSPECIFIED: ICD-10-CM

## 2025-08-14 DIAGNOSIS — R92.343 MAMMOGRAPHIC EXTREME DENSITY, BILATERAL BREASTS: ICD-10-CM

## 2025-08-14 DIAGNOSIS — C50.919 MALIGNANT NEOPLASM OF UNSPECIFIED SITE OF UNSPECIFIED FEMALE BREAST: ICD-10-CM

## 2025-08-14 DIAGNOSIS — Z90.11 ACQUIRED ABSENCE OF RIGHT BREAST AND NIPPLE: ICD-10-CM

## 2025-08-14 PROCEDURE — 99213 OFFICE O/P EST LOW 20 MIN: CPT

## 2025-09-03 ENCOUNTER — APPOINTMENT (OUTPATIENT)
Dept: PLASTIC SURGERY | Facility: CLINIC | Age: 55
End: 2025-09-03
Payer: MEDICAID

## 2025-09-03 DIAGNOSIS — Z42.1 ENCOUNTER FOR BREAST RECONSTRUCTION FOLLOWING MASTECTOMY: ICD-10-CM

## 2025-09-03 PROCEDURE — 99214 OFFICE O/P EST MOD 30 MIN: CPT | Mod: 95

## 2025-09-03 RX ORDER — IBUPROFEN 600 MG/1
600 TABLET, FILM COATED ORAL EVERY 6 HOURS
Qty: 24 | Refills: 0 | Status: ACTIVE | COMMUNITY
Start: 2025-09-03 | End: 1900-01-01

## 2025-09-03 RX ORDER — PANTOPRAZOLE 40 MG/1
40 TABLET, DELAYED RELEASE ORAL DAILY
Qty: 10 | Refills: 0 | Status: ACTIVE | COMMUNITY
Start: 2025-09-03 | End: 1900-01-01

## 2025-09-03 RX ORDER — DIAZEPAM 5 MG/1
5 TABLET ORAL EVERY 8 HOURS
Qty: 15 | Refills: 0 | Status: ACTIVE | COMMUNITY
Start: 2025-09-03 | End: 1900-01-01

## 2025-09-03 RX ORDER — ASPIRIN 325 MG/1
325 TABLET, FILM COATED ORAL
Qty: 10 | Refills: 0 | Status: ACTIVE | COMMUNITY
Start: 2025-09-03 | End: 1900-01-01

## 2025-09-03 RX ORDER — OXYCODONE 5 MG/1
5 TABLET ORAL
Qty: 15 | Refills: 0 | Status: ACTIVE | COMMUNITY
Start: 2025-09-03 | End: 1900-01-01

## 2025-09-11 ENCOUNTER — RESULT REVIEW (OUTPATIENT)
Age: 55
End: 2025-09-11

## 2025-09-13 ENCOUNTER — TRANSCRIPTION ENCOUNTER (OUTPATIENT)
Age: 55
End: 2025-09-13

## 2025-09-17 ENCOUNTER — APPOINTMENT (OUTPATIENT)
Dept: PLASTIC SURGERY | Facility: CLINIC | Age: 55
End: 2025-09-17
Payer: MEDICAID

## 2025-09-17 DIAGNOSIS — Z42.1 ENCOUNTER FOR BREAST RECONSTRUCTION FOLLOWING MASTECTOMY: ICD-10-CM

## 2025-09-17 PROCEDURE — 99024 POSTOP FOLLOW-UP VISIT: CPT

## (undated) DEVICE — ELCTR STRYKER NEPTUNE SMOKE EVACUATION PENCIL (GREEN)

## (undated) DEVICE — DRSG GAUZE FLUFF 1PLY 18X30"

## (undated) DEVICE — STAPLER SKIN PROXIMATE

## (undated) DEVICE — SUT VICRYL 2-0 27" SH

## (undated) DEVICE — DRSG DERMABOND 0.7ML

## (undated) DEVICE — Device

## (undated) DEVICE — VENODYNE/SCD SLEEVE CALF MEDIUM

## (undated) DEVICE — WARMING BLANKET FULL UNDERBODY ADULT

## (undated) DEVICE — DRAIN RESERVOIR FOR JACKSON PRATT 100CC CARDINAL

## (undated) DEVICE — SUT VICRYL 2-0 27" SH UNDYED

## (undated) DEVICE — DRAPE PROBE COVER 5" X 96"

## (undated) DEVICE — GLV 7.5 PROTEXIS ORTHO (CREAM)

## (undated) DEVICE — SUT SILK 2-0 18" PS

## (undated) DEVICE — CLIPPER BLADE GENERAL USE

## (undated) DEVICE — POSITIONER FOAM EGG CRATE ULNAR 2PCS (PINK)

## (undated) DEVICE — SYR LUER LOK 5CC

## (undated) DEVICE — SUT SILK 2-0 30" PSL

## (undated) DEVICE — WOUND IRR IRRISEPT W 0.5 CHG

## (undated) DEVICE — PACK PROC UNIV MAYO STAND 29118

## (undated) DEVICE — ELCTR BOVIE TIP BLADE INSULATED 2.75" EDGE

## (undated) DEVICE — NEPTUNE II 4-PORT MANIFOLD

## (undated) DEVICE — DRAPE CAMERA VIDEO 7"X96"

## (undated) DEVICE — WARMING BLANKET UPPER ADULT

## (undated) DEVICE — DRSG STOCKINETTE TUBULAR COTTON 2PLY 6X60"

## (undated) DEVICE — DRAPE 3/4 SHEET 52X76"

## (undated) DEVICE — PACK BREAST RECONSTRUCTION

## (undated) DEVICE — MARKING PEN W RULER

## (undated) DEVICE — DRAPE TOWEL BLUE 17" X 24"

## (undated) DEVICE — ONETRAC LIGHTED RETRACTOR 135 X 30MM DISP

## (undated) DEVICE — DRAPE IOBAN 23" X 23"

## (undated) DEVICE — TUBING SUCTION NONCONDUCTIVE 6MM X 12FT

## (undated) DEVICE — SUT MONOCRYL 3-0 18" PS-1

## (undated) DEVICE — SYR LUER LOK 10CC

## (undated) DEVICE — DRSG KERLIX ROLL 4.5"